# Patient Record
Sex: FEMALE | Race: WHITE
[De-identification: names, ages, dates, MRNs, and addresses within clinical notes are randomized per-mention and may not be internally consistent; named-entity substitution may affect disease eponyms.]

---

## 2019-07-22 ENCOUNTER — HOSPITAL ENCOUNTER (EMERGENCY)
Dept: HOSPITAL 83 - ED | Age: 64
LOS: 1 days | Discharge: HOME | End: 2019-07-23
Payer: MEDICARE

## 2019-07-22 VITALS — WEIGHT: 115 LBS | HEIGHT: 55 IN

## 2019-07-22 DIAGNOSIS — S00.03XA: Primary | ICD-10-CM

## 2019-07-22 DIAGNOSIS — I10: ICD-10-CM

## 2019-07-22 DIAGNOSIS — S40.022A: ICD-10-CM
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
## 2019-07-22 DIAGNOSIS — G40.909: ICD-10-CM

## 2019-07-22 DIAGNOSIS — Z79.899: ICD-10-CM

## 2019-07-22 DIAGNOSIS — F17.200: ICD-10-CM

## 2019-08-28 ENCOUNTER — HOSPITAL ENCOUNTER (EMERGENCY)
Dept: HOSPITAL 83 - ED | Age: 64
Discharge: HOME | End: 2019-08-28
Payer: MEDICARE

## 2019-08-28 VITALS — HEIGHT: 61.97 IN | WEIGHT: 120 LBS | BODY MASS INDEX: 22.08 KG/M2
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
## 2019-08-28 DIAGNOSIS — Z79.899: ICD-10-CM
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
## 2019-08-28 DIAGNOSIS — F17.200: ICD-10-CM

## 2019-08-28 DIAGNOSIS — S60.221A: Primary | ICD-10-CM

## 2019-08-28 DIAGNOSIS — S70.01XA: ICD-10-CM
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
## 2019-08-28 DIAGNOSIS — Z86.718: ICD-10-CM

## 2019-08-28 DIAGNOSIS — S00.83XA: ICD-10-CM

## 2019-08-28 DIAGNOSIS — R11.10: ICD-10-CM

## 2019-11-08 ENCOUNTER — HOSPITAL ENCOUNTER (INPATIENT)
Dept: HOSPITAL 83 - ED | Age: 64
LOS: 4 days | Discharge: OTHER INSTITUTION | DRG: 689 | End: 2019-11-12
Attending: EMERGENCY MEDICINE | Admitting: EMERGENCY MEDICINE
Payer: MEDICARE

## 2019-11-08 VITALS — DIASTOLIC BLOOD PRESSURE: 80 MMHG | SYSTOLIC BLOOD PRESSURE: 128 MMHG

## 2019-11-08 VITALS — DIASTOLIC BLOOD PRESSURE: 72 MMHG

## 2019-11-08 VITALS — WEIGHT: 107 LBS | HEIGHT: 62 IN | BODY MASS INDEX: 19.69 KG/M2

## 2019-11-08 VITALS — DIASTOLIC BLOOD PRESSURE: 102 MMHG | SYSTOLIC BLOOD PRESSURE: 189 MMHG

## 2019-11-08 VITALS — SYSTOLIC BLOOD PRESSURE: 128 MMHG | DIASTOLIC BLOOD PRESSURE: 68 MMHG

## 2019-11-08 VITALS — DIASTOLIC BLOOD PRESSURE: 90 MMHG

## 2019-11-08 DIAGNOSIS — Z79.899: ICD-10-CM

## 2019-11-08 DIAGNOSIS — B19.20: ICD-10-CM

## 2019-11-08 DIAGNOSIS — K29.80: ICD-10-CM

## 2019-11-08 DIAGNOSIS — K29.70: ICD-10-CM

## 2019-11-08 DIAGNOSIS — K44.9: ICD-10-CM

## 2019-11-08 DIAGNOSIS — Z90.49: ICD-10-CM

## 2019-11-08 DIAGNOSIS — I10: ICD-10-CM

## 2019-11-08 DIAGNOSIS — F17.210: ICD-10-CM

## 2019-11-08 DIAGNOSIS — E44.0: ICD-10-CM

## 2019-11-08 DIAGNOSIS — D69.6: ICD-10-CM

## 2019-11-08 DIAGNOSIS — K22.10: ICD-10-CM

## 2019-11-08 DIAGNOSIS — N17.0: ICD-10-CM

## 2019-11-08 DIAGNOSIS — E87.8: ICD-10-CM

## 2019-11-08 DIAGNOSIS — J44.9: ICD-10-CM

## 2019-11-08 DIAGNOSIS — E87.2: ICD-10-CM

## 2019-11-08 DIAGNOSIS — Z84.89: ICD-10-CM

## 2019-11-08 DIAGNOSIS — G40.909: ICD-10-CM

## 2019-11-08 DIAGNOSIS — Z86.73: ICD-10-CM

## 2019-11-08 DIAGNOSIS — K25.9: ICD-10-CM

## 2019-11-08 DIAGNOSIS — N39.0: Primary | ICD-10-CM

## 2019-11-08 DIAGNOSIS — K74.60: ICD-10-CM

## 2019-11-08 DIAGNOSIS — R74.8: ICD-10-CM

## 2019-11-08 DIAGNOSIS — D75.89: ICD-10-CM

## 2019-11-08 DIAGNOSIS — Z91.81: ICD-10-CM

## 2019-11-08 DIAGNOSIS — K21.0: ICD-10-CM

## 2019-11-08 LAB
ALBUMIN SERPL-MCNC: 3.8 GM/DL (ref 3.1–4.5)
ALP SERPL-CCNC: 128 U/L (ref 45–117)
ALT SERPL W P-5'-P-CCNC: 35 U/L (ref 12–78)
APPEARANCE UR: CLEAR
APTT PPP: 27.5 SECONDS (ref 20–32.1)
AST SERPL-CCNC: 25 IU/L (ref 3–35)
BACTERIA #/AREA URNS HPF: (no result) /[HPF]
BASOPHILS # BLD AUTO: 0.1 10*3/UL (ref 0–0.1)
BASOPHILS NFR BLD AUTO: 1 % (ref 0–1)
BILIRUB UR QL STRIP: NEGATIVE
BUN SERPL-MCNC: 26 MG/DL (ref 7–24)
CHLORIDE SERPL-SCNC: 108 MMOL/L (ref 98–107)
COLOR UR: YELLOW
CREAT SERPL-MCNC: 1.21 MG/DL (ref 0.55–1.02)
EOSINOPHIL # BLD AUTO: 0.1 10*3/UL (ref 0–0.4)
EOSINOPHIL # BLD AUTO: 1.6 % (ref 1–4)
EPI CELLS #/AREA URNS HPF: (no result) /[HPF]
ERYTHROCYTE [DISTWIDTH] IN BLOOD BY AUTOMATED COUNT: 16.5 % (ref 0–14.5)
GLUCOSE UR QL: NEGATIVE
HCT VFR BLD AUTO: 38.9 % (ref 37–47)
HGB BLD-MCNC: 12.6 G/DL (ref 12–16)
HGB UR QL STRIP: NEGATIVE
INR BLD: 1 (ref 2–3.5)
KETONES UR QL STRIP: NEGATIVE
LEUKOCYTE ESTERASE UR QL STRIP: (no result)
LIPASE SERPL-CCNC: 101 U/L (ref 73–393)
LYMPHOCYTES # BLD AUTO: 1.4 10*3/UL (ref 1.3–4.4)
LYMPHOCYTES NFR BLD AUTO: 20.7 % (ref 27–41)
MCH RBC QN AUTO: 32.3 PG (ref 27–31)
MCHC RBC AUTO-ENTMCNC: 32.4 G/DL (ref 33–37)
MCV RBC AUTO: 99.7 FL (ref 81–99)
MONOCYTES # BLD AUTO: 0.5 10*3/UL (ref 0.1–1)
MONOCYTES NFR BLD MANUAL: 7.4 % (ref 3–9)
NEUT #: 4.6 10*3/UL (ref 2.3–7.9)
NEUT %: 68.9 % (ref 47–73)
NITRITE UR QL STRIP: NEGATIVE
NRBC BLD QL AUTO: 0 10*3/UL (ref 0–0)
PH UR STRIP: 6 [PH] (ref 5–9)
PLATELET # BLD AUTO: 104 10*3/UL (ref 130–400)
PMV BLD AUTO: 9.8 FL (ref 9.6–12.3)
POTASSIUM SERPL-SCNC: 4.5 MMOL/L (ref 3.5–5.1)
PROT SERPL-MCNC: 8.4 GM/DL (ref 6.4–8.2)
RBC # BLD AUTO: 3.9 10*6/UL (ref 4.1–5.1)
RBC #/AREA URNS HPF: (no result) RBC/HPF (ref 0–2)
SODIUM SERPL-SCNC: 137 MMOL/L (ref 136–145)
SP GR UR: 1.01 (ref 1–1.03)
TROPONIN I SERPL-MCNC: < 0.015 NG/ML (ref ?–0.04)
UROBILINOGEN UR STRIP-MCNC: 0.2 E.U./DL (ref 0.2–1)
WBC NRBC COR # BLD AUTO: 6.7 10*3/UL (ref 4.8–10.8)

## 2019-11-08 NOTE — NUR
Time: 1635
A 64  year old FEMALE admitted to 4E
under services of MARKO NAIR DO.
Pt. arrived via stretcher from
ER.  Chief complaint: FALLS.
 
JENNIFER SUTTON

## 2019-11-09 VITALS — SYSTOLIC BLOOD PRESSURE: 109 MMHG | DIASTOLIC BLOOD PRESSURE: 80 MMHG

## 2019-11-09 VITALS — DIASTOLIC BLOOD PRESSURE: 80 MMHG

## 2019-11-09 VITALS — DIASTOLIC BLOOD PRESSURE: 60 MMHG | SYSTOLIC BLOOD PRESSURE: 110 MMHG

## 2019-11-09 VITALS — DIASTOLIC BLOOD PRESSURE: 67 MMHG

## 2019-11-09 VITALS — SYSTOLIC BLOOD PRESSURE: 143 MMHG | DIASTOLIC BLOOD PRESSURE: 63 MMHG

## 2019-11-09 LAB
25(OH)D3 SERPL-MCNC: 9.8 NG/ML (ref 30–100)
ALBUMIN SERPL-MCNC: 3 GM/DL (ref 3.1–4.5)
ALP SERPL-CCNC: 108 U/L (ref 45–117)
ALT SERPL W P-5'-P-CCNC: 30 U/L (ref 12–78)
APTT PPP: 26.7 SECONDS (ref 20–32.1)
AST SERPL-CCNC: 27 IU/L (ref 3–35)
BASOPHILS # BLD AUTO: 0 10*3/UL (ref 0–0.1)
BASOPHILS NFR BLD AUTO: 0.6 % (ref 0–1)
BUN SERPL-MCNC: 17 MG/DL (ref 7–24)
CHLORIDE SERPL-SCNC: 115 MMOL/L (ref 98–107)
CHOLEST SERPL-MCNC: 115 MG/DL (ref ?–200)
CREAT SERPL-MCNC: 0.86 MG/DL (ref 0.55–1.02)
EOSINOPHIL # BLD AUTO: 0.2 10*3/UL (ref 0–0.4)
EOSINOPHIL # BLD AUTO: 2.4 % (ref 1–4)
ERYTHROCYTE [DISTWIDTH] IN BLOOD BY AUTOMATED COUNT: 16.9 % (ref 0–14.5)
HCT VFR BLD AUTO: 38.8 % (ref 37–47)
HDLC SERPL-MCNC: 50 MG/DL (ref 40–60)
HGB BLD-MCNC: 12.7 G/DL (ref 12–16)
INR BLD: 1 (ref 2–3.5)
LDLC SERPL DIRECT ASSAY-MCNC: 47 MG/DL (ref 9–159)
LYMPHOCYTES # BLD AUTO: 0.8 10*3/UL (ref 1.3–4.4)
LYMPHOCYTES NFR BLD AUTO: 12.2 % (ref 27–41)
MCH RBC QN AUTO: 32.8 PG (ref 27–31)
MCHC RBC AUTO-ENTMCNC: 32.7 G/DL (ref 33–37)
MCV RBC AUTO: 100.3 FL (ref 81–99)
MONOCYTES # BLD AUTO: 0.3 10*3/UL (ref 0.1–1)
MONOCYTES NFR BLD MANUAL: 5.3 % (ref 3–9)
NEUT #: 4.9 10*3/UL (ref 2.3–7.9)
NEUT %: 79 % (ref 47–73)
NRBC BLD QL AUTO: 0 % (ref 0–0)
PHOSPHATE SERPL-MCNC: 3.2 MG/DL (ref 2.5–4.9)
PLATELET # BLD AUTO: 74 10*3/UL (ref 130–400)
PMV BLD AUTO: 9.9 FL (ref 9.6–12.3)
POTASSIUM SERPL-SCNC: 4 MMOL/L (ref 3.5–5.1)
PROT SERPL-MCNC: 6.9 GM/DL (ref 6.4–8.2)
RBC # BLD AUTO: 3.87 10*6/UL (ref 4.1–5.1)
SODIUM SERPL-SCNC: 141 MMOL/L (ref 136–145)
T4 FREE SERPL-MCNC: 0.94 NG/DL (ref 0.76–1.46)
TRIGL SERPL-MCNC: 90 MG/DL (ref ?–150)
TSH SERPL DL<=0.005 MIU/L-ACNC: 3.07 UIU/ML (ref 0.36–4.75)
VITAMIN B12: 1504 PG/ML (ref 247–911)
VLDLC SERPL CALC-MCNC: 18 MG/DL (ref 6–40)
WBC NRBC COR # BLD AUTO: 6.2 10*3/UL (ref 4.8–10.8)

## 2019-11-09 NOTE — NUR
NORCO GIVEN PER ORDER FOR BACK AND RIGHT SIDED PAIN RATED "7". SEE MAR. UP TO
BATHROOM WITH MINIMAL ASSIST X1 AND BACK TO BED.

## 2019-11-10 VITALS — SYSTOLIC BLOOD PRESSURE: 135 MMHG | DIASTOLIC BLOOD PRESSURE: 58 MMHG

## 2019-11-10 VITALS — DIASTOLIC BLOOD PRESSURE: 64 MMHG

## 2019-11-10 VITALS — DIASTOLIC BLOOD PRESSURE: 48 MMHG | SYSTOLIC BLOOD PRESSURE: 107 MMHG

## 2019-11-10 VITALS — DIASTOLIC BLOOD PRESSURE: 58 MMHG

## 2019-11-10 VITALS — DIASTOLIC BLOOD PRESSURE: 85 MMHG

## 2019-11-10 LAB
ALBUMIN SERPL-MCNC: 3 GM/DL (ref 3.1–4.5)
ALP SERPL-CCNC: 121 U/L (ref 45–117)
ALT SERPL W P-5'-P-CCNC: 25 U/L (ref 12–78)
AST SERPL-CCNC: 21 IU/L (ref 3–35)
BASOPHILS # BLD AUTO: 0 10*3/UL (ref 0–0.1)
BASOPHILS NFR BLD AUTO: 1.1 % (ref 0–1)
BUN SERPL-MCNC: 13 MG/DL (ref 7–24)
CHLORIDE SERPL-SCNC: 114 MMOL/L (ref 98–107)
CREAT SERPL-MCNC: 0.77 MG/DL (ref 0.55–1.02)
EOSINOPHIL # BLD AUTO: 0.1 10*3/UL (ref 0–0.4)
EOSINOPHIL # BLD AUTO: 3.6 % (ref 1–4)
ERYTHROCYTE [DISTWIDTH] IN BLOOD BY AUTOMATED COUNT: 16.8 % (ref 0–14.5)
HCT VFR BLD AUTO: 34.8 % (ref 37–47)
HGB BLD-MCNC: 11.5 G/DL (ref 12–16)
LYMPHOCYTES # BLD AUTO: 0.7 10*3/UL (ref 1.3–4.4)
LYMPHOCYTES NFR BLD AUTO: 17.8 % (ref 27–41)
MCH RBC QN AUTO: 32.9 PG (ref 27–31)
MCHC RBC AUTO-ENTMCNC: 33 G/DL (ref 33–37)
MCV RBC AUTO: 99.4 FL (ref 81–99)
MONOCYTES # BLD AUTO: 0.3 10*3/UL (ref 0.1–1)
MONOCYTES NFR BLD MANUAL: 8.7 % (ref 3–9)
NEUT #: 2.5 10*3/UL (ref 2.3–7.9)
NEUT %: 68.3 % (ref 47–73)
NRBC BLD QL AUTO: 0 10*3/UL (ref 0–0)
PLATELET # BLD AUTO: 65 10*3/UL (ref 130–400)
PMV BLD AUTO: 9.8 FL (ref 9.6–12.3)
POTASSIUM SERPL-SCNC: 4.2 MMOL/L (ref 3.5–5.1)
PROT SERPL-MCNC: 7.1 GM/DL (ref 6.4–8.2)
RBC # BLD AUTO: 3.5 10*6/UL (ref 4.1–5.1)
SODIUM SERPL-SCNC: 143 MMOL/L (ref 136–145)
WBC NRBC COR # BLD AUTO: 3.7 10*3/UL (ref 4.8–10.8)

## 2019-11-10 NOTE — NUR
PT REQUESTED AND WAS MEDICATED WITH NORCO FOR C/O GENERALIZED PAIN AND XANAX
FOR C/O ANXIETY. CALL LIGHT IN REACH. WILL MONITOR

## 2019-11-10 NOTE — NUR
Patient resting quietly with no c/o discomfort. Respirations easy and regular.
Vital signs stable. No overt distress.
ANTONINA OLIVER R

## 2019-11-11 VITALS — DIASTOLIC BLOOD PRESSURE: 60 MMHG

## 2019-11-11 VITALS — DIASTOLIC BLOOD PRESSURE: 64 MMHG | SYSTOLIC BLOOD PRESSURE: 109 MMHG

## 2019-11-11 VITALS — DIASTOLIC BLOOD PRESSURE: 68 MMHG

## 2019-11-11 VITALS — DIASTOLIC BLOOD PRESSURE: 58 MMHG | SYSTOLIC BLOOD PRESSURE: 113 MMHG

## 2019-11-11 VITALS — SYSTOLIC BLOOD PRESSURE: 123 MMHG | DIASTOLIC BLOOD PRESSURE: 70 MMHG

## 2019-11-11 VITALS — DIASTOLIC BLOOD PRESSURE: 75 MMHG

## 2019-11-11 VITALS — DIASTOLIC BLOOD PRESSURE: 54 MMHG

## 2019-11-11 LAB
ALBUMIN SERPL-MCNC: 2.9 GM/DL (ref 3.1–4.5)
ALP SERPL-CCNC: 107 U/L (ref 45–117)
ALT SERPL W P-5'-P-CCNC: 28 U/L (ref 12–78)
AST SERPL-CCNC: 21 IU/L (ref 3–35)
BUN SERPL-MCNC: 18 MG/DL (ref 7–24)
CHLORIDE SERPL-SCNC: 110 MMOL/L (ref 98–107)
CREAT SERPL-MCNC: 0.72 MG/DL (ref 0.55–1.02)
ERYTHROCYTE [DISTWIDTH] IN BLOOD BY AUTOMATED COUNT: 16.9 % (ref 0–14.5)
HCT VFR BLD AUTO: 35.5 % (ref 37–47)
HGB BLD-MCNC: 11.7 G/DL (ref 12–16)
MCH RBC QN AUTO: 32.9 PG (ref 27–31)
MCHC RBC AUTO-ENTMCNC: 33 G/DL (ref 33–37)
MCV RBC AUTO: 99.7 FL (ref 81–99)
NRBC BLD QL AUTO: 0 % (ref 0–0)
PLATELET # BLD AUTO: 63 10*3/UL (ref 130–400)
PLATELET SUFFICIENCY: (no result)
PMV BLD AUTO: 10.9 FL (ref 9.6–12.3)
POTASSIUM SERPL-SCNC: 4.3 MMOL/L (ref 3.5–5.1)
PROT SERPL-MCNC: 6.9 GM/DL (ref 6.4–8.2)
RBC # BLD AUTO: 3.56 10*6/UL (ref 4.1–5.1)
RBC MORPH BLD: NORMAL
SODIUM SERPL-SCNC: 140 MMOL/L (ref 136–145)
TOTAL CELLS COUNTED: 100 #CELLS
WBC NRBC COR # BLD AUTO: 3 10*3/UL (ref 4.8–10.8)

## 2019-11-11 PROCEDURE — 0DB78ZX EXCISION OF STOMACH, PYLORUS, VIA NATURAL OR ARTIFICIAL OPENING ENDOSCOPIC, DIAGNOSTIC: ICD-10-PCS | Performed by: INTERNAL MEDICINE

## 2019-11-11 NOTE — NUR
Occupational Therapy evaluation completed on 4 with full eval to follow.
Precautions include fall risk;bed alarm, h/o recent falls, right
hemiparesis,moderate complexity level 98635 via chart review, testing and
evaluation. Recommend OT per POC and SNF to enable return home at indep level.
Thank you.
Mary Lou Warren OTR/l

## 2019-11-11 NOTE — NUR
in to talk to patient.
Patient states lives at HOME with BROTHER.
There are FEW steps in the home.
Physician: MIKE
[facility]
[place] health services: NONE
Patient's level of ADLs: MINIMAL ASSIST
Patient has working utilities: YES
DME: NONE
Follow-up physician's appointment after d/c: WILL BE MADE BY HOSPITALIST NURSE
DIRECTOR ON DISCHARGE
Does patient want to access PORTAL?: NO
Discharge plan PT LIVES AT HOME WITH HER BROTHER.  STATES SHE HAS BEEN FALLING
A LOT LATELY, TALKED WITH PT ABOUT GOING TO A SNF STAY PRIOR TO GOING HOME AND
SHE IS AGREEING.  GIVEN LIST OF FACILITIES SHE STATES SHE HAS BEEN TO [place]
PRIOR AND WOULD LIKE TO RETURN THERE. DISCHARGE PLANNER INFORMED AND WILL SEND
REFERRAL.  WILL CONTINUE TO FOLLOW..
 
MORENA SANTOS

## 2019-11-11 NOTE — NUR
PRN RESTORIL EFFECTIVE. PATIENT SLEEPING IN BED. RESPIRATIONS EVEN AND
UNLABORED. CALL LIGHT WITHIN REACH. BED ALARM ON FOR SAFETY.

## 2019-11-11 NOTE — NUR
Nutritional Support Services Note: Pt has a scabbed area noted on back, she
states its due to a fall at home last week. Appetite is good for meals, at
times flucuates. She doesn't want a supplement at this time, but we will
provide a snack nightly. Will follow as needed. Umm Serrano Rdn Ld

## 2019-11-11 NOTE — NUR
ARCENIO EVERETT [id number]
 
Please refer to the physician's history and physical for past medical history,
comorbid conditions, and allergies.
   Diagnosis: UTI ACUTE KIDNEY FAILURE WITH TUBULAR NECROSIS
 
   Jackson Score: 19,LOW OR NO RISK
 
WOUND DESCRIPTIONS:
Wound Number: 1
Location of the wound: LOWER BACK MIDLINE
Type of wound: STAGE 2
Thickness: Partial
Size: 1.5cm X 1.7cm X 0.1cm
Tunneling: NONE
Undermining: NONE
Sinus Tract: NONE
Presence of Exudate: Serous
Amount: Light
Color: Red
Odor: None
Periwound Skin Appearance: Erythema
Wound edges: APPROXIMATED
Pain (associated with wound): DENIED AT TIME OF ASSESSMENT
How does patient state this happened? PATIENT STATES THIS HAPPENED WHEN SHE
FELL THIS PAST WEEK.
   Surface the patient is resting on: Isoflex
 
SKIN PREVENTION RECOMMENDATION:
   1.  Pressure redistribution support surface as appropriate
   2.  Elevate heels
   3.  Remove boots/TEDS every shift and reapply
   4.  Head of bed 30 degrees as tolerated
   5.  Assess nutrition and hydration
   6.  Manage moisture
   7.  Avoid the use of containment devices while in bed
   8.  Use absorptive products on surfaces limit layers of linens on bed
   9.  Turn and reposition every 1-2 hours in bed and every 1 hour in chair as
       tolerated
   10. Weight shifts every 15 minutes while up in chair
   11. Offloading with pillows or device to keep heels elevated off bed
   12. Monitor skin at least every shift
   13. Inspect under medical devices twice a day
 
 
WOUND TREATMENT RECOMMENDATIONS:
CONTINUE CURRENT STAGE 2 GUIDELINES.

## 2019-11-11 NOTE — NUR
PATIENT MEDICATED WITH PRN NORCO FOR C/O GENERALIZED PAIN. LUNG SOUNDS CLEAR.
BS NORMOACTIVE X4. PATIENT DENIES NAUSEA AND VOMITING. CALL LIGHT WITHIN
REACH. WILL MONITOR FOR EFFECTIVENESS.

## 2019-11-11 NOTE — NUR
PATIENT MEDICATED WITH PRN RESTORIL PER REQUEST FOR SOMETHING FOR SLEEP. WILL
MONITOR FOR EFFECTIVENESS.

## 2019-11-11 NOTE — NUR
PHYSICAL THERAPY
 
Patient seen this pm 1:1 for therapy visit and was supine in bed with a family
visitor upon therapist arrival. Patient identified by name /  and was very
pleasant this afternoon, voicing no new c/o's.  Patient transfers supine to
sit EOB with MIN A and then sit to stand CGA x 1. Patient ambulates HHA/CGA,
45'x 2, demonstrating slow, ataxic gait pattern and pronated R foot posture.
Patient able to take 5-6 backward steps and tolerated eyes open / closed with
only slight LOB, but was able to self correct each trial. Patient returned to
supine in bed with mild fatigue and remained with call light, tray table,
telephone, bed alarm for safety. Will continue per POC as tolerated, total
treatment time 16 minutes.  Yoshi Ayers, PTA

## 2019-11-11 NOTE — NUR
PATIENT ASSISTED TO RESTROOM AND BACK TO BED BY STAFF. GAIT VERY UNSTEADY THIS
MORNING. NO COMPLAINTS OF PAIN. CALL LIGHT WITHIN REACH. BED ALARM ON FOR
SAFETY.

## 2019-11-11 NOTE — NUR
PHYSICAL THERAPY
 
Nursing screen received and chart reviewed. Physical therapy order received
and completed. Thank you. Petra Gould,PT,DPT

## 2019-11-11 NOTE — NUR
Patient requesting referral to [facility] ([facility]).
Contacted [facility] and faxed referral. waiting on review/acceptance.

## 2019-11-12 VITALS — SYSTOLIC BLOOD PRESSURE: 119 MMHG | DIASTOLIC BLOOD PRESSURE: 67 MMHG

## 2019-11-12 VITALS — DIASTOLIC BLOOD PRESSURE: 67 MMHG

## 2019-11-12 NOTE — NUR
Discharge instructions reviewed with patient/family. Patient receptive and
verbalizes understanding. Follow-up care arranged. Written instructions given
to patient/family.
ANTONINA OLIVER

## 2019-11-12 NOTE — NUR
PT REQUESTED AND WAS MEDICATED WITH NORCO FOR C/O GENERALIZED PAIN. CALL LIGHT
IN REACH. WILLL MONITOR

## 2019-11-12 NOTE — NUR
Patient accepted to [facility]. 3 night stay complete.
Patient can go when medically stable for discharge.

## 2019-11-12 NOTE — NUR
Patient is discharged to [place], transportation scheduled for 1PM with
[place] ambulette. NH, nursing/charles clerk all notified.
Patient stated her son is her only contact and he is at work until 8 PM.

## 2019-11-12 NOTE — NUR
PHYSICAL THERAPY
 
Patient was approached several times this am for therapy visit and was
sleeping first attempt, then eating a late breakfast upon second attempt. Will
continue per POC as able.  Yoshi Ayers, PTA

## 2019-11-12 NOTE — NUR
Patient stated she will care for this area upon return home and if she needs
to follow up anywhere she will contact the wound care center

## 2019-11-13 NOTE — NUR
OCCUPATIONAL THERAPY CO-SIGN
 
I approve of the Occupational Therapy notes written above.
RABIA JIM OTR/KANU

## 2019-12-28 ENCOUNTER — HOSPITAL ENCOUNTER (EMERGENCY)
Dept: HOSPITAL 83 - ED | Age: 64
Discharge: ANOTHER HOSPITAL | End: 2019-12-28
Payer: MEDICARE

## 2019-12-28 VITALS — HEIGHT: 61.97 IN | BODY MASS INDEX: 27.6 KG/M2 | WEIGHT: 150 LBS

## 2019-12-28 DIAGNOSIS — S01.01XA: ICD-10-CM
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
## 2019-12-28 DIAGNOSIS — F10.929: ICD-10-CM

## 2019-12-28 DIAGNOSIS — R41.0: ICD-10-CM
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
## 2019-12-28 DIAGNOSIS — M79.601: ICD-10-CM

## 2019-12-28 DIAGNOSIS — I10: ICD-10-CM

## 2019-12-28 DIAGNOSIS — Z86.73: ICD-10-CM

## 2019-12-28 DIAGNOSIS — Z90.49: ICD-10-CM

## 2019-12-28 DIAGNOSIS — M79.604: ICD-10-CM

## 2019-12-28 DIAGNOSIS — F12.90: ICD-10-CM

## 2019-12-28 DIAGNOSIS — R47.81: ICD-10-CM
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
## 2019-12-28 DIAGNOSIS — Z79.899: ICD-10-CM

## 2019-12-28 DIAGNOSIS — F17.200: ICD-10-CM

## 2019-12-28 DIAGNOSIS — S06.5X2A: Primary | ICD-10-CM
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
## 2019-12-28 LAB
ALBUMIN SERPL-MCNC: 3.5 GM/DL (ref 3.1–4.5)
ALP SERPL-CCNC: 179 U/L (ref 45–117)
ALT SERPL W P-5'-P-CCNC: 36 U/L (ref 12–78)
AMPHETAMINES UR QL SCN: < 1000
APPEARANCE UR: (no result)
APTT PPP: 28 SECONDS (ref 20–32.1)
AST SERPL-CCNC: 35 IU/L (ref 3–35)
BACTERIA #/AREA URNS HPF: (no result) /[HPF]
BARBITURATES UR QL SCN: < 200
BASOPHILS # BLD AUTO: 0.1 10*3/UL (ref 0–0.1)
BASOPHILS NFR BLD AUTO: 0.7 % (ref 0–1)
BENZODIAZ UR QL SCN: < 200
BILIRUB UR QL STRIP: NEGATIVE
BUN SERPL-MCNC: 22 MG/DL (ref 7–24)
BZE UR QL SCN: < 300
CANNABINOIDS UR QL SCN: < 50
CHLORIDE SERPL-SCNC: 113 MMOL/L (ref 98–107)
COLOR UR: YELLOW
CREAT SERPL-MCNC: 1.08 MG/DL (ref 0.55–1.02)
EOSINOPHIL # BLD AUTO: 0 10*3/UL (ref 0–0.4)
EOSINOPHIL # BLD AUTO: 0.4 % (ref 1–4)
EPI CELLS #/AREA URNS HPF: (no result) /[HPF]
ERYTHROCYTE [DISTWIDTH] IN BLOOD BY AUTOMATED COUNT: 13.9 % (ref 0–14.5)
ETHANOL SERPL-MCNC: 146 MG/DL (ref ?–3)
GLUCOSE UR QL: NEGATIVE
HCT VFR BLD AUTO: 33.6 % (ref 37–47)
HGB BLD-MCNC: 11.1 G/DL (ref 12–16)
HGB UR QL STRIP: (no result)
INR BLD: 1 (ref 2–3.5)
KETONES UR QL STRIP: NEGATIVE
LEUKOCYTE ESTERASE UR QL STRIP: (no result)
LYMPHOCYTES # BLD AUTO: 1.2 10*3/UL (ref 1.3–4.4)
LYMPHOCYTES NFR BLD AUTO: 10.9 % (ref 27–41)
MCH RBC QN AUTO: 33.3 PG (ref 27–31)
MCHC RBC AUTO-ENTMCNC: 33 G/DL (ref 33–37)
MCV RBC AUTO: 100.9 FL (ref 81–99)
METHADONE UR QL SCN: < 300
MONOCYTES # BLD AUTO: 0.5 10*3/UL (ref 0.1–1)
MONOCYTES NFR BLD MANUAL: 4.3 % (ref 3–9)
NEUT #: 8.7 10*3/UL (ref 2.3–7.9)
NEUT %: 82.7 % (ref 47–73)
NITRITE UR QL STRIP: NEGATIVE
NRBC BLD QL AUTO: 0 10*3/UL (ref 0–0)
OPIATES UR QL SCN: < 300
PCP UR QL SCN: <  25
PH UR STRIP: 6 [PH] (ref 5–9)
PLATELET # BLD AUTO: 96 10*3/UL (ref 130–400)
PMV BLD AUTO: 10.3 FL (ref 9.6–12.3)
POTASSIUM SERPL-SCNC: 3.9 MMOL/L (ref 3.5–5.1)
PROT SERPL-MCNC: 8 GM/DL (ref 6.4–8.2)
RBC # BLD AUTO: 3.33 10*6/UL (ref 4.1–5.1)
SODIUM SERPL-SCNC: 142 MMOL/L (ref 136–145)
SP GR UR: 1.02 (ref 1–1.03)
UROBILINOGEN UR STRIP-MCNC: 0.2 E.U./DL (ref 0.2–1)
WBC #/AREA URNS HPF: (no result) WBC/HPF (ref 0–5)
WBC NRBC COR # BLD AUTO: 10.5 10*3/UL (ref 4.8–10.8)

## 2020-01-01 ENCOUNTER — APPOINTMENT (OUTPATIENT)
Dept: GENERAL RADIOLOGY | Age: 65
DRG: 870 | End: 2020-01-01
Payer: MEDICARE

## 2020-01-01 ENCOUNTER — APPOINTMENT (OUTPATIENT)
Dept: ULTRASOUND IMAGING | Age: 65
DRG: 870 | End: 2020-01-01
Payer: MEDICARE

## 2020-01-01 ENCOUNTER — HOSPITAL ENCOUNTER (INPATIENT)
Age: 65
LOS: 6 days | DRG: 870 | End: 2020-08-15
Attending: EMERGENCY MEDICINE | Admitting: FAMILY MEDICINE
Payer: MEDICARE

## 2020-01-01 ENCOUNTER — APPOINTMENT (OUTPATIENT)
Dept: CT IMAGING | Age: 65
DRG: 870 | End: 2020-01-01
Payer: MEDICARE

## 2020-01-01 ENCOUNTER — HOSPITAL ENCOUNTER (EMERGENCY)
Age: 65
Discharge: ANOTHER HOSPITAL | End: 2020-04-28
Attending: EMERGENCY MEDICINE
Payer: MEDICARE

## 2020-01-01 VITALS
SYSTOLIC BLOOD PRESSURE: 147 MMHG | OXYGEN SATURATION: 84 % | TEMPERATURE: 99.2 F | HEIGHT: 62 IN | WEIGHT: 179.9 LBS | DIASTOLIC BLOOD PRESSURE: 72 MMHG | BODY MASS INDEX: 33.1 KG/M2

## 2020-01-01 VITALS
TEMPERATURE: 98 F | RESPIRATION RATE: 18 BRPM | DIASTOLIC BLOOD PRESSURE: 93 MMHG | OXYGEN SATURATION: 98 % | HEART RATE: 114 BPM | WEIGHT: 115 LBS | SYSTOLIC BLOOD PRESSURE: 193 MMHG

## 2020-01-01 LAB
AADO2: 105.6 MMHG
AADO2: 132.5 MMHG
AADO2: 134.5 MMHG
AADO2: 143.7 MMHG
AADO2: 151.1 MMHG
AADO2: 161 MMHG
ABO/RH: NORMAL
ABO/RH: NORMAL
ACETAMINOPHEN LEVEL: <5 MCG/ML (ref 10–30)
ACINETOBACTER BAUMANNII BY PCR: NOT DETECTED
ALBUMIN SERPL-MCNC: 1.6 G/DL (ref 3.5–5.2)
ALBUMIN SERPL-MCNC: 1.8 G/DL (ref 3.5–5.2)
ALBUMIN SERPL-MCNC: 1.9 G/DL (ref 3.5–5.2)
ALBUMIN SERPL-MCNC: 2.2 G/DL (ref 3.5–5.2)
ALBUMIN SERPL-MCNC: 2.3 G/DL (ref 3.5–5.2)
ALBUMIN SERPL-MCNC: 2.4 G/DL (ref 3.5–5.2)
ALBUMIN SERPL-MCNC: 2.5 G/DL (ref 3.5–5.2)
ALBUMIN SERPL-MCNC: 3.2 G/DL (ref 3.5–5.2)
ALBUMIN SERPL-MCNC: ABNORMAL G/DL (ref 3.5–5.2)
ALP BLD-CCNC: 163 U/L (ref 35–104)
ALP BLD-CCNC: 189 U/L (ref 35–104)
ALP BLD-CCNC: 198 U/L (ref 35–104)
ALP BLD-CCNC: 203 U/L (ref 35–104)
ALP BLD-CCNC: 212 U/L (ref 35–104)
ALP BLD-CCNC: 229 U/L (ref 35–104)
ALP BLD-CCNC: 240 U/L (ref 35–104)
ALP BLD-CCNC: 272 U/L (ref 35–104)
ALP BLD-CCNC: ABNORMAL U/L (ref 35–104)
ALT SERPL-CCNC: 18 U/L (ref 0–32)
ALT SERPL-CCNC: 18 U/L (ref 0–32)
ALT SERPL-CCNC: 22 U/L (ref 0–32)
ALT SERPL-CCNC: 24 U/L (ref 0–32)
ALT SERPL-CCNC: 32 U/L (ref 0–32)
ALT SERPL-CCNC: 40 U/L (ref 0–32)
ALT SERPL-CCNC: 57 U/L (ref 0–32)
ALT SERPL-CCNC: 58 U/L (ref 0–32)
ALT SERPL-CCNC: ABNORMAL U/L (ref 0–32)
AMMONIA: 21.3 UMOL/L (ref 11–51)
AMPHETAMINE SCREEN, URINE: NOT DETECTED
ANION GAP SERPL CALCULATED.3IONS-SCNC: 11 MMOL/L (ref 7–16)
ANION GAP SERPL CALCULATED.3IONS-SCNC: 12 MMOL/L (ref 7–16)
ANION GAP SERPL CALCULATED.3IONS-SCNC: 13 MMOL/L (ref 7–16)
ANION GAP SERPL CALCULATED.3IONS-SCNC: 13 MMOL/L (ref 7–16)
ANION GAP SERPL CALCULATED.3IONS-SCNC: 16 MMOL/L (ref 7–16)
ANION GAP SERPL CALCULATED.3IONS-SCNC: 16 MMOL/L (ref 7–16)
ANION GAP SERPL CALCULATED.3IONS-SCNC: 20 MMOL/L (ref 7–16)
ANION GAP SERPL CALCULATED.3IONS-SCNC: 20 MMOL/L (ref 7–16)
ANION GAP SERPL CALCULATED.3IONS-SCNC: 26 MMOL/L (ref 7–16)
ANION GAP SERPL CALCULATED.3IONS-SCNC: ABNORMAL MMOL/L (ref 7–16)
ANISOCYTOSIS: ABNORMAL
ANTIBODY IDENTIFICATION: NORMAL
ANTIBODY SCREEN: NORMAL
ANTIBODY SCREEN: NORMAL
APTT: 25.5 SEC (ref 24.5–35.1)
APTT: 30.7 SEC (ref 24.5–35.1)
APTT: 32.8 SEC (ref 24.5–35.1)
AST SERPL-CCNC: 118 U/L (ref 0–31)
AST SERPL-CCNC: 204 U/L (ref 0–31)
AST SERPL-CCNC: 23 U/L (ref 0–31)
AST SERPL-CCNC: 238 U/L (ref 0–31)
AST SERPL-CCNC: 25 U/L (ref 0–31)
AST SERPL-CCNC: 25 U/L (ref 0–31)
AST SERPL-CCNC: 42 U/L (ref 0–31)
AST SERPL-CCNC: 53 U/L (ref 0–31)
AST SERPL-CCNC: ABNORMAL U/L (ref 0–31)
B.E.: -1.8 MMOL/L (ref -3–3)
B.E.: -10.3 MMOL/L (ref -3–3)
B.E.: -17.3 MMOL/L (ref -3–3)
B.E.: -18.1 MMOL/L (ref -3–3)
B.E.: -25.3 MMOL/L (ref -3–3)
B.E.: -3.2 MMOL/L (ref -3–3)
B.E.: -3.4 MMOL/L (ref -3–0)
B.E.: -3.6 MMOL/L (ref -3–3)
B.E.: 1.9 MMOL/L (ref -3–3)
BACTERIA: ABNORMAL /HPF
BARBITURATE SCREEN URINE: NOT DETECTED
BASOPHILS ABSOLUTE: 0 E9/L (ref 0–0.2)
BASOPHILS ABSOLUTE: 0.04 E9/L (ref 0–0.2)
BASOPHILS ABSOLUTE: 0.1 E9/L (ref 0–0.2)
BASOPHILS ABSOLUTE: 0.11 E9/L (ref 0–0.2)
BASOPHILS RELATIVE PERCENT: 0 % (ref 0–2)
BASOPHILS RELATIVE PERCENT: 0.2 % (ref 0–2)
BASOPHILS RELATIVE PERCENT: 0.6 % (ref 0–2)
BASOPHILS RELATIVE PERCENT: 0.9 % (ref 0–2)
BENZODIAZEPINE SCREEN, URINE: NOT DETECTED
BETA-HYDROXYBUTYRATE: 0.46 MMOL/L (ref 0.02–0.27)
BILIRUB SERPL-MCNC: 0.7 MG/DL (ref 0–1.2)
BILIRUB SERPL-MCNC: 0.8 MG/DL (ref 0–1.2)
BILIRUB SERPL-MCNC: 0.9 MG/DL (ref 0–1.2)
BILIRUB SERPL-MCNC: 0.9 MG/DL (ref 0–1.2)
BILIRUB SERPL-MCNC: 1.4 MG/DL (ref 0–1.2)
BILIRUB SERPL-MCNC: 1.7 MG/DL (ref 0–1.2)
BILIRUB SERPL-MCNC: 1.8 MG/DL (ref 0–1.2)
BILIRUB SERPL-MCNC: 2.2 MG/DL (ref 0–1.2)
BILIRUB SERPL-MCNC: ABNORMAL MG/DL (ref 0–1.2)
BILIRUBIN DIRECT: 0.9 MG/DL (ref 0–0.3)
BILIRUBIN URINE: ABNORMAL
BILIRUBIN, INDIRECT: 0 MG/DL (ref 0–1)
BLASTS RELATIVE PERCENT: 0.9 % (ref 0–0)
BLOOD BANK DISPENSE STATUS: NORMAL
BLOOD BANK PRODUCT CODE: NORMAL
BLOOD, URINE: ABNORMAL
BOTTLE TYPE: ABNORMAL
BPU ID: NORMAL
BUN BLDV-MCNC: 13 MG/DL (ref 8–23)
BUN BLDV-MCNC: 14 MG/DL (ref 8–23)
BUN BLDV-MCNC: 18 MG/DL (ref 8–23)
BUN BLDV-MCNC: 19 MG/DL (ref 8–23)
BUN BLDV-MCNC: 22 MG/DL (ref 8–23)
BUN BLDV-MCNC: 24 MG/DL (ref 8–23)
BUN BLDV-MCNC: 31 MG/DL (ref 8–23)
BUN BLDV-MCNC: 96 MG/DL (ref 8–23)
BUN BLDV-MCNC: 97 MG/DL (ref 8–23)
BUN BLDV-MCNC: ABNORMAL MG/DL (ref 8–23)
BURR CELLS: ABNORMAL
C-REACTIVE PROTEIN: 26.8 MG/DL (ref 0–0.4)
CALCIUM IONIZED: 0.9 MMOL/L (ref 1.15–1.33)
CALCIUM SERPL-MCNC: 6 MG/DL (ref 8.6–10.2)
CALCIUM SERPL-MCNC: 6.1 MG/DL (ref 8.6–10.2)
CALCIUM SERPL-MCNC: 6.2 MG/DL (ref 8.6–10.2)
CALCIUM SERPL-MCNC: 7.2 MG/DL (ref 8.6–10.2)
CALCIUM SERPL-MCNC: 7.2 MG/DL (ref 8.6–10.2)
CALCIUM SERPL-MCNC: 7.5 MG/DL (ref 8.6–10.2)
CALCIUM SERPL-MCNC: 7.6 MG/DL (ref 8.6–10.2)
CALCIUM SERPL-MCNC: 8.5 MG/DL (ref 8.6–10.2)
CALCIUM SERPL-MCNC: 8.6 MG/DL (ref 8.6–10.2)
CALCIUM SERPL-MCNC: ABNORMAL MG/DL (ref 8.6–10.2)
CANDIDA ALBICANS BY PCR: NOT DETECTED
CANDIDA GLABRATA BY PCR: NOT DETECTED
CANDIDA KRUSEI BY PCR: NOT DETECTED
CANDIDA PARAPSILOSIS BY PCR: NOT DETECTED
CANDIDA TROPICALIS BY PCR: NOT DETECTED
CANNABINOID SCREEN URINE: NOT DETECTED
CARBAPENEM RESISTANCE KPC BY PCR: NOT DETECTED
CHLORIDE BLD-SCNC: 102 MMOL/L (ref 98–107)
CHLORIDE BLD-SCNC: 102 MMOL/L (ref 98–107)
CHLORIDE BLD-SCNC: 94 MMOL/L (ref 98–107)
CHLORIDE BLD-SCNC: 96 MMOL/L (ref 98–107)
CHLORIDE BLD-SCNC: 96 MMOL/L (ref 98–107)
CHLORIDE BLD-SCNC: 97 MMOL/L (ref 98–107)
CHLORIDE BLD-SCNC: 97 MMOL/L (ref 98–107)
CHLORIDE BLD-SCNC: 98 MMOL/L (ref 98–107)
CHLORIDE BLD-SCNC: 99 MMOL/L (ref 98–107)
CHLORIDE BLD-SCNC: ABNORMAL MMOL/L (ref 98–107)
CHLORIDE URINE RANDOM: 70 MMOL/L
CHP ED QC CHECK: NORMAL
CLARITY: ABNORMAL
CO2: 16 MMOL/L (ref 22–29)
CO2: 19 MMOL/L (ref 22–29)
CO2: 19 MMOL/L (ref 22–29)
CO2: 20 MMOL/L (ref 22–29)
CO2: 22 MMOL/L (ref 22–29)
CO2: 23 MMOL/L (ref 22–29)
CO2: 24 MMOL/L (ref 22–29)
CO2: 6 MMOL/L (ref 22–29)
CO2: 9 MMOL/L (ref 22–29)
CO2: ABNORMAL MMOL/L (ref 22–29)
COCAINE METABOLITE SCREEN URINE: NOT DETECTED
COHB: 0 % (ref 0–1.5)
COHB: 0.3 % (ref 0–1.5)
COHB: 0.4 % (ref 0–1.5)
COHB: 0.5 % (ref 0–1.5)
COHB: 0.6 % (ref 0–1.5)
COHB: 0.8 % (ref 0–1.5)
COHB: 1.3 % (ref 0–1.5)
COHB: 1.7 % (ref 0–1.5)
COLOR: ABNORMAL
CORTISOL TOTAL: 89.1 MCG/DL (ref 2.68–18.4)
CREAT SERPL-MCNC: 1.1 MG/DL (ref 0.5–1)
CREAT SERPL-MCNC: 1.2 MG/DL (ref 0.5–1)
CREAT SERPL-MCNC: 1.4 MG/DL (ref 0.5–1)
CREAT SERPL-MCNC: 1.4 MG/DL (ref 0.5–1)
CREAT SERPL-MCNC: 1.6 MG/DL (ref 0.5–1)
CREAT SERPL-MCNC: 1.7 MG/DL (ref 0.5–1)
CREAT SERPL-MCNC: 1.9 MG/DL (ref 0.5–1)
CREAT SERPL-MCNC: 3.7 MG/DL (ref 0.5–1)
CREAT SERPL-MCNC: 3.8 MG/DL (ref 0.5–1)
CREAT SERPL-MCNC: ABNORMAL MG/DL (ref 0.5–1)
CREATININE URINE: 29 MG/DL (ref 29–226)
CRITICAL: ABNORMAL
CULTURE, RESPIRATORY: ABNORMAL
CULTURE, RESPIRATORY: ABNORMAL
D DIMER: 4781 NG/ML DDU
D DIMER: >5250 NG/ML DDU
DAT POLYSPECIFIC: NORMAL
DAT POLYSPECIFIC: NORMAL
DATE ANALYZED: ABNORMAL
DATE OF COLLECTION: ABNORMAL
DELIVERY SYSTEMS: ABNORMAL
DESCRIPTION BLOOD BANK: NORMAL
DEVICE: ABNORMAL
DOHLE BODIES: ABNORMAL
DR. NOTIFY: NORMAL
DR. NOTIFY: NORMAL
EKG ATRIAL RATE: 42 BPM
EKG ATRIAL RATE: 97 BPM
EKG P AXIS: 37 DEGREES
EKG P-R INTERVAL: 138 MS
EKG Q-T INTERVAL: 344 MS
EKG Q-T INTERVAL: 466 MS
EKG QRS DURATION: 134 MS
EKG QRS DURATION: 96 MS
EKG QTC CALCULATION (BAZETT): 436 MS
EKG QTC CALCULATION (BAZETT): 550 MS
EKG R AXIS: -66 DEGREES
EKG R AXIS: 24 DEGREES
EKG T AXIS: 30 DEGREES
EKG T AXIS: 54 DEGREES
EKG VENTRICULAR RATE: 84 BPM
EKG VENTRICULAR RATE: 97 BPM
ENTEROBACTER CLOACAE COMPLEX BY PCR: NOT DETECTED
ENTEROBACTERALES BY PCR: DETECTED
ENTEROCOCCUS BY PCR: NOT DETECTED
EOSINOPHILS ABSOLUTE: 0 E9/L (ref 0.05–0.5)
EOSINOPHILS ABSOLUTE: 0.1 E9/L (ref 0.05–0.5)
EOSINOPHILS ABSOLUTE: 0.11 E9/L (ref 0.05–0.5)
EOSINOPHILS ABSOLUTE: 0.15 E9/L (ref 0.05–0.5)
EOSINOPHILS ABSOLUTE: 0.48 E9/L (ref 0.05–0.5)
EOSINOPHILS RELATIVE PERCENT: 0 % (ref 0–6)
EOSINOPHILS RELATIVE PERCENT: 0.9 % (ref 0–6)
EOSINOPHILS RELATIVE PERCENT: 0.9 % (ref 0–6)
EOSINOPHILS RELATIVE PERCENT: 1 % (ref 0–6)
EOSINOPHILS RELATIVE PERCENT: 2.9 % (ref 0–6)
ESCHERICHIA COLI BY PCR: DETECTED
ETHANOL: <10 MG/DL (ref 0–0.08)
FENTANYL SCREEN, URINE: NOT DETECTED
FERRITIN: 914 NG/ML
FIBRINOGEN: 636 MG/DL (ref 225–540)
FIBRINOGEN: 651 MG/DL (ref 225–540)
FIO2 ARTERIAL: 40
FIO2: 100 %
FIO2: 40 %
FIO2: 50 %
FIO2: 50 %
FOLATE: 10.5 NG/ML (ref 4.8–24.2)
GFR AFRICAN AMERICAN: 15
GFR AFRICAN AMERICAN: 15
GFR AFRICAN AMERICAN: 32
GFR AFRICAN AMERICAN: 37
GFR AFRICAN AMERICAN: 39
GFR AFRICAN AMERICAN: 46
GFR AFRICAN AMERICAN: 46
GFR AFRICAN AMERICAN: 55
GFR AFRICAN AMERICAN: >60
GFR AFRICAN AMERICAN: >60
GFR NON-AFRICAN AMERICAN: 12 ML/MIN/1.73
GFR NON-AFRICAN AMERICAN: 12 ML/MIN/1.73
GFR NON-AFRICAN AMERICAN: 27 ML/MIN/1.73
GFR NON-AFRICAN AMERICAN: 30 ML/MIN/1.73
GFR NON-AFRICAN AMERICAN: 33 ML/MIN/1.73
GFR NON-AFRICAN AMERICAN: 38 ML/MIN/1.73
GFR NON-AFRICAN AMERICAN: 38 ML/MIN/1.73
GFR NON-AFRICAN AMERICAN: 45 ML/MIN/1.73
GFR NON-AFRICAN AMERICAN: 50 ML/MIN/1.73
GFR NON-AFRICAN AMERICAN: >60 ML/MIN/1.73
GLUCOSE BLD-MCNC: 112 MG/DL (ref 74–99)
GLUCOSE BLD-MCNC: 139 MG/DL (ref 74–99)
GLUCOSE BLD-MCNC: 193 MG/DL (ref 74–99)
GLUCOSE BLD-MCNC: 201 MG/DL (ref 74–99)
GLUCOSE BLD-MCNC: 206 MG/DL (ref 74–99)
GLUCOSE BLD-MCNC: 207 MG/DL (ref 74–99)
GLUCOSE BLD-MCNC: 222 MG/DL (ref 74–99)
GLUCOSE BLD-MCNC: 241 MG/DL (ref 74–99)
GLUCOSE BLD-MCNC: 241 MG/DL (ref 74–99)
GLUCOSE BLD-MCNC: 69 MG/DL
GLUCOSE BLD-MCNC: ABNORMAL MG/DL (ref 74–99)
GLUCOSE URINE: NEGATIVE MG/DL
HAEMOPHILUS INFLUENZAE BY PCR: NOT DETECTED
HAPTOGLOBIN: 158 MG/DL (ref 30–200)
HAV IGM SER IA-ACNC: ABNORMAL
HBA1C MFR BLD: 5 % (ref 4–5.6)
HBV SURFACE AB TITR SER: NORMAL {TITER}
HCO3 ARTERIAL: 20 MMOL/L (ref 22–26)
HCO3: 13.8 MMOL/L (ref 22–26)
HCO3: 18 MMOL/L (ref 22–26)
HCO3: 19.6 MMOL/L (ref 22–26)
HCO3: 19.7 MMOL/L (ref 22–26)
HCO3: 22 MMOL/L (ref 22–26)
HCO3: 4.9 MMOL/L (ref 22–26)
HCO3: 7 MMOL/L (ref 22–26)
HCO3: 8.9 MMOL/L (ref 22–26)
HCT VFR BLD CALC: 19.5 % (ref 34–48)
HCT VFR BLD CALC: 21 % (ref 34–48)
HCT VFR BLD CALC: 21.4 % (ref 34–48)
HCT VFR BLD CALC: 22.9 % (ref 34–48)
HCT VFR BLD CALC: 25.6 % (ref 34–48)
HCT VFR BLD CALC: 28.4 % (ref 34–48)
HCT VFR BLD CALC: 28.4 % (ref 34–48)
HCT VFR BLD CALC: 29.1 % (ref 34–48)
HCT VFR BLD CALC: 36.8 % (ref 34–48)
HEMOGLOBIN: 10 G/DL (ref 11.5–15.5)
HEMOGLOBIN: 11.8 G/DL (ref 11.5–15.5)
HEMOGLOBIN: 6.7 G/DL (ref 11.5–15.5)
HEMOGLOBIN: 7.5 G/DL (ref 11.5–15.5)
HEMOGLOBIN: 7.8 G/DL (ref 11.5–15.5)
HEMOGLOBIN: 8.1 G/DL (ref 11.5–15.5)
HEMOGLOBIN: 9.8 G/DL (ref 11.5–15.5)
HEMOGLOBIN: 9.9 G/DL (ref 11.5–15.5)
HEPATITIS B CORE IGM ANTIBODY: ABNORMAL
HEPATITIS B SURFACE ANTIGEN INTERPRETATION: ABNORMAL
HEPATITIS C ANTIBODY INTERPRETATION: REACTIVE
HHB: 0.1 % (ref 0–5)
HHB: 0.6 % (ref 0–5)
HHB: 0.7 % (ref 0–5)
HHB: 0.8 % (ref 0–5)
HHB: 1.5 % (ref 0–5)
HHB: 1.5 % (ref 0–5)
HHB: 2.3 % (ref 0–5)
HHB: 2.6 % (ref 0–5)
HYPOCHROMIA: ABNORMAL
IMMATURE GRANULOCYTES #: 0.54 E9/L
IMMATURE GRANULOCYTES #: 0.81 E9/L
IMMATURE GRANULOCYTES %: 3.3 % (ref 0–5)
IMMATURE GRANULOCYTES %: 4.9 % (ref 0–5)
IMMATURE RETIC FRACT: 16.3 % (ref 3–15.9)
INR BLD: 1.1
INR BLD: 1.2
INR BLD: 1.3
IRON SATURATION: 14 % (ref 15–50)
IRON: 16 MCG/DL (ref 37–145)
KETONES, URINE: NEGATIVE MG/DL
KLEBSIELLA OXYTOCA BY PCR: NOT DETECTED
KLEBSIELLA PNEUMONIAE GROUP BY PCR: NOT DETECTED
LAB: ABNORMAL
LACTATE DEHYDROGENASE: 637 U/L (ref 135–214)
LACTIC ACID, SEPSIS: 1.9 MMOL/L (ref 0.5–1.9)
LACTIC ACID: 1.5 MMOL/L (ref 0.5–2.2)
LACTIC ACID: 1.6 MMOL/L (ref 0.5–2.2)
LACTIC ACID: 2.3 MMOL/L (ref 0.5–2.2)
LACTIC ACID: 2.4 MMOL/L (ref 0.5–2.2)
LACTIC ACID: 2.4 MMOL/L (ref 0.5–2.2)
LACTIC ACID: 2.5 MMOL/L (ref 0.5–2.2)
LACTIC ACID: 3.5 MMOL/L (ref 0.5–2.2)
LACTIC ACID: 4.1 MMOL/L (ref 0.5–2.2)
LACTIC ACID: 5 MMOL/L (ref 0.5–2.2)
LACTIC ACID: 5.2 MMOL/L (ref 0.5–2.2)
LEUKOCYTE ESTERASE, URINE: ABNORMAL
LIPASE: 13 U/L (ref 13–60)
LIPASE: ABNORMAL U/L (ref 13–60)
LISTERIA MONOCYTOGENES BY PCR: NOT DETECTED
LV EF: 65 %
LVEF MODALITY: NORMAL
LYMPHOCYTES ABSOLUTE: 0.29 E9/L (ref 1.5–4)
LYMPHOCYTES ABSOLUTE: 0.44 E9/L (ref 1.5–4)
LYMPHOCYTES ABSOLUTE: 0.5 E9/L (ref 1.5–4)
LYMPHOCYTES ABSOLUTE: 0.62 E9/L (ref 1.5–4)
LYMPHOCYTES ABSOLUTE: 0.64 E9/L (ref 1.5–4)
LYMPHOCYTES ABSOLUTE: 0.69 E9/L (ref 1.5–4)
LYMPHOCYTES ABSOLUTE: 0.82 E9/L (ref 1.5–4)
LYMPHOCYTES ABSOLUTE: 1.04 E9/L (ref 1.5–4)
LYMPHOCYTES RELATIVE PERCENT: 2 % (ref 20–42)
LYMPHOCYTES RELATIVE PERCENT: 3.5 % (ref 20–42)
LYMPHOCYTES RELATIVE PERCENT: 3.5 % (ref 20–42)
LYMPHOCYTES RELATIVE PERCENT: 4.3 % (ref 20–42)
LYMPHOCYTES RELATIVE PERCENT: 4.4 % (ref 20–42)
LYMPHOCYTES RELATIVE PERCENT: 5 % (ref 20–42)
LYMPHOCYTES RELATIVE PERCENT: 5.2 % (ref 20–42)
LYMPHOCYTES RELATIVE PERCENT: 6.3 % (ref 20–42)
Lab: ABNORMAL
Lab: NORMAL
MAGNESIUM: 1.4 MG/DL (ref 1.6–2.6)
MAGNESIUM: 1.6 MG/DL (ref 1.6–2.6)
MAGNESIUM: 1.7 MG/DL (ref 1.6–2.6)
MAGNESIUM: 1.7 MG/DL (ref 1.6–2.6)
MAGNESIUM: 1.8 MG/DL (ref 1.6–2.6)
MAGNESIUM: 1.9 MG/DL (ref 1.6–2.6)
MCH RBC QN AUTO: 32 PG (ref 26–35)
MCH RBC QN AUTO: 32.4 PG (ref 26–35)
MCH RBC QN AUTO: 32.8 PG (ref 26–35)
MCH RBC QN AUTO: 32.9 PG (ref 26–35)
MCH RBC QN AUTO: 33.2 PG (ref 26–35)
MCH RBC QN AUTO: 33.4 PG (ref 26–35)
MCH RBC QN AUTO: 33.5 PG (ref 26–35)
MCH RBC QN AUTO: 33.9 PG (ref 26–35)
MCHC RBC AUTO-ENTMCNC: 32.1 % (ref 32–34.5)
MCHC RBC AUTO-ENTMCNC: 33.7 % (ref 32–34.5)
MCHC RBC AUTO-ENTMCNC: 34.4 % (ref 32–34.5)
MCHC RBC AUTO-ENTMCNC: 34.9 % (ref 32–34.5)
MCHC RBC AUTO-ENTMCNC: 35.2 % (ref 32–34.5)
MCHC RBC AUTO-ENTMCNC: 35.4 % (ref 32–34.5)
MCHC RBC AUTO-ENTMCNC: 35.7 % (ref 32–34.5)
MCHC RBC AUTO-ENTMCNC: 36.4 % (ref 32–34.5)
MCV RBC AUTO: 102.5 FL (ref 80–99.9)
MCV RBC AUTO: 91.9 FL (ref 80–99.9)
MCV RBC AUTO: 91.9 FL (ref 80–99.9)
MCV RBC AUTO: 92.9 FL (ref 80–99.9)
MCV RBC AUTO: 93 FL (ref 80–99.9)
MCV RBC AUTO: 94.6 FL (ref 80–99.9)
MCV RBC AUTO: 95.6 FL (ref 80–99.9)
MCV RBC AUTO: 99.3 FL (ref 80–99.9)
METAMYELOCYTES RELATIVE PERCENT: 0.9 % (ref 0–1)
METAMYELOCYTES RELATIVE PERCENT: 1 % (ref 0–1)
METER GLUCOSE: 128 MG/DL (ref 74–99)
METER GLUCOSE: 128 MG/DL (ref 74–99)
METER GLUCOSE: 132 MG/DL (ref 74–99)
METER GLUCOSE: 147 MG/DL (ref 74–99)
METER GLUCOSE: 147 MG/DL (ref 74–99)
METER GLUCOSE: 158 MG/DL (ref 74–99)
METER GLUCOSE: 200 MG/DL (ref 74–99)
METER GLUCOSE: 201 MG/DL (ref 74–99)
METER GLUCOSE: 205 MG/DL (ref 74–99)
METER GLUCOSE: 209 MG/DL (ref 74–99)
METER GLUCOSE: 224 MG/DL (ref 74–99)
METER GLUCOSE: 226 MG/DL (ref 74–99)
METER GLUCOSE: 229 MG/DL (ref 74–99)
METER GLUCOSE: 255 MG/DL (ref 74–99)
METER GLUCOSE: 64 MG/DL (ref 74–99)
METER GLUCOSE: 69 MG/DL (ref 74–99)
METER GLUCOSE: 94 MG/DL (ref 74–99)
METHADONE SCREEN, URINE: NOT DETECTED
METHB: 0 % (ref 0–1.5)
METHB: 0.1 % (ref 0–1.5)
METHB: 0.1 % (ref 0–1.5)
METHB: 0.2 % (ref 0–1.5)
METHB: 0.2 % (ref 0–1.5)
METHB: 0.3 % (ref 0–1.5)
METHB: 0.4 % (ref 0–1.5)
METHB: 0.4 % (ref 0–1.5)
MODE: ABNORMAL
MODE: AC
MONOCYTES ABSOLUTE: 0.33 E9/L (ref 0.1–0.95)
MONOCYTES ABSOLUTE: 0.52 E9/L (ref 0.1–0.95)
MONOCYTES ABSOLUTE: 0.58 E9/L (ref 0.1–0.95)
MONOCYTES ABSOLUTE: 0.62 E9/L (ref 0.1–0.95)
MONOCYTES ABSOLUTE: 0.62 E9/L (ref 0.1–0.95)
MONOCYTES ABSOLUTE: 0.64 E9/L (ref 0.1–0.95)
MONOCYTES ABSOLUTE: 0.73 E9/L (ref 0.1–0.95)
MONOCYTES ABSOLUTE: 0.91 E9/L (ref 0.1–0.95)
MONOCYTES RELATIVE PERCENT: 2.6 % (ref 2–12)
MONOCYTES RELATIVE PERCENT: 2.6 % (ref 2–12)
MONOCYTES RELATIVE PERCENT: 3.5 % (ref 2–12)
MONOCYTES RELATIVE PERCENT: 4 % (ref 2–12)
MONOCYTES RELATIVE PERCENT: 4.4 % (ref 2–12)
MONOCYTES RELATIVE PERCENT: 5.2 % (ref 2–12)
MONOCYTES RELATIVE PERCENT: 5.2 % (ref 2–12)
MONOCYTES RELATIVE PERCENT: 5.5 % (ref 2–12)
MRSA CULTURE ONLY: NORMAL
NEISSERIA MENINGITIDIS BY PCR: NOT DETECTED
NEUTROPHILS ABSOLUTE: 10.23 E9/L (ref 1.8–7.3)
NEUTROPHILS ABSOLUTE: 10.91 E9/L (ref 1.8–7.3)
NEUTROPHILS ABSOLUTE: 11.25 E9/L (ref 1.8–7.3)
NEUTROPHILS ABSOLUTE: 13.26 E9/L (ref 1.8–7.3)
NEUTROPHILS ABSOLUTE: 13.49 E9/L (ref 1.8–7.3)
NEUTROPHILS ABSOLUTE: 14.33 E9/L (ref 1.8–7.3)
NEUTROPHILS ABSOLUTE: 14.88 E9/L (ref 1.8–7.3)
NEUTROPHILS ABSOLUTE: 15.92 E9/L (ref 1.8–7.3)
NEUTROPHILS RELATIVE PERCENT: 80.2 % (ref 43–80)
NEUTROPHILS RELATIVE PERCENT: 86.7 % (ref 43–80)
NEUTROPHILS RELATIVE PERCENT: 86.9 % (ref 43–80)
NEUTROPHILS RELATIVE PERCENT: 90.4 % (ref 43–80)
NEUTROPHILS RELATIVE PERCENT: 92 % (ref 43–80)
NEUTROPHILS RELATIVE PERCENT: 92.2 % (ref 43–80)
NEUTROPHILS RELATIVE PERCENT: 93 % (ref 43–80)
NEUTROPHILS RELATIVE PERCENT: 93 % (ref 43–80)
NITRITE, URINE: NEGATIVE
NUCLEATED RED BLOOD CELLS: 0 /100 WBC
NUCLEATED RED BLOOD CELLS: 0.9 /100 WBC
NUCLEATED RED BLOOD CELLS: 1.7 /100 WBC
O2 CONTENT: 11.7 ML/DL
O2 CONTENT: 11.9 ML/DL
O2 CONTENT: 12.3 ML/DL
O2 CONTENT: 12.4 ML/DL
O2 CONTENT: 13.5 ML/DL
O2 CONTENT: 14.8 ML/DL
O2 CONTENT: 9.9 ML/DL
O2 SATURATION: 97.4 % (ref 92–98.5)
O2 SATURATION: 97.5 % (ref 92–98.5)
O2 SATURATION: 97.7 % (ref 92–98.5)
O2 SATURATION: 98.5 % (ref 92–98.5)
O2 SATURATION: 98.5 % (ref 92–98.5)
O2 SATURATION: 99.2 % (ref 92–98.5)
O2 SATURATION: 99.3 % (ref 92–98.5)
O2 SATURATION: 99.4 % (ref 92–98.5)
O2 SATURATION: 99.9 % (ref 92–98.5)
O2HB: 95.8 % (ref 94–97)
O2HB: 95.9 % (ref 94–97)
O2HB: 97.7 % (ref 94–97)
O2HB: 97.7 % (ref 94–97)
O2HB: 98.6 % (ref 94–97)
O2HB: 98.7 % (ref 94–97)
O2HB: 98.7 % (ref 94–97)
O2HB: 99.5 % (ref 94–97)
OPERATOR ID: 1067
OPERATOR ID: 2260
OPERATOR ID: 2485
OPERATOR ID: 3114
OPERATOR ID: 3342
OPERATOR ID: 3342
OPERATOR ID: 913
OPERATOR ID: 914
OPERATOR ID: ABNORMAL
OPIATE SCREEN URINE: NOT DETECTED
ORDER NUMBER: ABNORMAL
ORGANISM: ABNORMAL
OSMOLALITY URINE: 282 MOSM/KG (ref 300–900)
OSMOLALITY: 312 MOSM/KG (ref 285–310)
OVALOCYTES: ABNORMAL
OXYCODONE URINE: NOT DETECTED
PATHOLOGIST REVIEW: NORMAL
PATIENT TEMP: 37 C
PCO2 ARTERIAL: 29.7 MMHG (ref 35–45)
PCO2: 16 MMHG (ref 35–45)
PCO2: 16.9 MMHG (ref 35–45)
PCO2: 20.2 MMHG (ref 35–45)
PCO2: 22.2 MMHG (ref 35–45)
PCO2: 22.6 MMHG (ref 35–45)
PCO2: 24.6 MMHG (ref 35–45)
PCO2: 28 MMHG (ref 35–45)
PCO2: 28.2 MMHG (ref 35–45)
PDW BLD-RTO: 14.6 FL (ref 11.5–15)
PDW BLD-RTO: 14.6 FL (ref 11.5–15)
PDW BLD-RTO: 15.2 FL (ref 11.5–15)
PDW BLD-RTO: 15.7 FL (ref 11.5–15)
PDW BLD-RTO: 15.9 FL (ref 11.5–15)
PDW BLD-RTO: 15.9 FL (ref 11.5–15)
PDW BLD-RTO: 16 FL (ref 11.5–15)
PDW BLD-RTO: 16 FL (ref 11.5–15)
PEEP/CPAP: 5 CMH2O
PFO2: 2.29 MMHG/%
PFO2: 2.48 MMHG/%
PFO2: 2.86 MMHG/%
PFO2: 3.21 MMHG/%
PFO2: 3.75 MMHG/%
PFO2: 3.83 MMHG/%
PH BLOOD GAS: 6.96 (ref 7.35–7.45)
PH BLOOD GAS: 7.21 (ref 7.35–7.45)
PH BLOOD GAS: 7.26 (ref 7.35–7.45)
PH BLOOD GAS: 7.37 (ref 7.35–7.45)
PH BLOOD GAS: 7.44 (ref 7.35–7.45)
PH BLOOD GAS: 7.46 (ref 7.35–7.45)
PH BLOOD GAS: 7.46 (ref 7.35–7.45)
PH BLOOD GAS: 7.65 (ref 7.35–7.45)
PH BLOOD GAS: 7.66 (ref 7.35–7.45)
PH UA: 6.5 (ref 5–9)
PHENCYCLIDINE SCREEN URINE: NOT DETECTED
PHENYTOIN DOSE AMOUNT: ABNORMAL
PHENYTOIN DOSE AMOUNT: ABNORMAL
PHENYTOIN LEVEL: 8.9 UG/ML (ref 10–20)
PHENYTOIN LEVEL: 9.6 UG/ML (ref 10–20)
PHOSPHORUS: 0.5 MG/DL (ref 2.5–4.5)
PHOSPHORUS: 1.1 MG/DL (ref 2.5–4.5)
PHOSPHORUS: 2.3 MG/DL (ref 2.5–4.5)
PHOSPHORUS: 2.3 MG/DL (ref 2.5–4.5)
PHOSPHORUS: 2.7 MG/DL (ref 2.5–4.5)
PHOSPHORUS: 2.8 MG/DL (ref 2.5–4.5)
PLATELET # BLD: 10 E9/L (ref 130–450)
PLATELET # BLD: 12 E9/L (ref 130–450)
PLATELET # BLD: 16 E9/L (ref 130–450)
PLATELET # BLD: 17 E9/L (ref 130–450)
PLATELET # BLD: 20 E9/L (ref 130–450)
PLATELET # BLD: 22 E9/L (ref 130–450)
PLATELET # BLD: 23 E9/L (ref 130–450)
PLATELET # BLD: 29 E9/L (ref 130–450)
PLATELET # BLD: 34 E9/L (ref 130–450)
PLATELET CONFIRMATION: NORMAL
PMV BLD AUTO: 11.4 FL (ref 7–12)
PMV BLD AUTO: 12.3 FL (ref 7–12)
PMV BLD AUTO: 12.5 FL (ref 7–12)
PMV BLD AUTO: 13.7 FL (ref 7–12)
PMV BLD AUTO: ABNORMAL FL (ref 7–12)
PO2 ARTERIAL: 91.2 MMHG (ref 60–80)
PO2: 114.4 MMHG (ref 75–100)
PO2: 136 MMHG (ref 75–100)
PO2: 150.2 MMHG (ref 75–100)
PO2: 160.3 MMHG (ref 75–100)
PO2: 191.5 MMHG (ref 75–100)
PO2: 91.7 MMHG (ref 75–100)
PO2: 99.3 MMHG (ref 75–100)
PO2: >500 MMHG (ref 75–100)
POIKILOCYTES: ABNORMAL
POLYCHROMASIA: ABNORMAL
POSITIVE END EXP PRESS: 5 CMH2O
POTASSIUM REFLEX MAGNESIUM: 4.4 MMOL/L (ref 3.5–5)
POTASSIUM REFLEX MAGNESIUM: 6.4 MMOL/L (ref 3.5–5)
POTASSIUM REFLEX MAGNESIUM: ABNORMAL MMOL/L (ref 3.5–5)
POTASSIUM SERPL-SCNC: 2.4 MMOL/L (ref 3.5–5)
POTASSIUM SERPL-SCNC: 3.1 MMOL/L (ref 3.5–5)
POTASSIUM SERPL-SCNC: 3.3 MMOL/L (ref 3.5–5)
POTASSIUM SERPL-SCNC: 3.4 MMOL/L (ref 3.5–5)
POTASSIUM SERPL-SCNC: 3.5 MMOL/L (ref 3.5–5)
POTASSIUM SERPL-SCNC: 3.6 MMOL/L (ref 3.5–5)
POTASSIUM SERPL-SCNC: 4 MMOL/L (ref 3.5–5)
POTASSIUM, UR: 15.2 MMOL/L
PROCALCITONIN: 11.54 NG/ML (ref 0–0.08)
PROTEIN PROTEIN: ABNORMAL MG/DL (ref 0–12)
PROTEIN UA: >=300 MG/DL
PROTEIN/CREAT RATIO: 20.7
PROTEIN/CREAT RATIO: ABNORMAL (ref 0–0.2)
PROTEUS BY PCR: NOT DETECTED
PROTHROMBIN TIME: 12.4 SEC (ref 9.3–12.4)
PROTHROMBIN TIME: 13.4 SEC (ref 9.3–12.4)
PROTHROMBIN TIME: 14.9 SEC (ref 9.3–12.4)
PSEUDOMONAS AERUGINOSA BY PCR: NOT DETECTED
RBC # BLD: 2.04 E12/L (ref 3.5–5.5)
RBC # BLD: 2.26 E12/L (ref 3.5–5.5)
RBC # BLD: 2.3 E12/L (ref 3.5–5.5)
RBC # BLD: 2.42 E12/L (ref 3.5–5.5)
RBC # BLD: 2.93 E12/L (ref 3.5–5.5)
RBC # BLD: 3.09 E12/L (ref 3.5–5.5)
RBC # BLD: 3.09 E12/L (ref 3.5–5.5)
RBC # BLD: 3.59 E12/L (ref 3.5–5.5)
RBC UA: ABNORMAL /HPF (ref 0–2)
RESPIRATORY RATE: 12 B/MIN
RETIC HGB EQUIVALENT: 30.4 PG (ref 28.2–36.6)
RETICULOCYTE ABSOLUTE COUNT: 0.04 E12/L
RETICULOCYTE COUNT PCT: 1.5 % (ref 0.4–1.9)
RI(T): 100 %
RI(T): 116 %
RI(T): 145 %
RI(T): 165 %
RI(T): 70 %
RI(T): 70 %
RR MECHANICAL: 12 B/MIN
RR MECHANICAL: 16 B/MIN
RR MECHANICAL: 25 B/MIN
SALICYLATE, SERUM: <0.3 MG/DL (ref 0–30)
SARS-COV-2, NAAT: NOT DETECTED
SCHISTOCYTES: ABNORMAL
SCHISTOCYTES: ABNORMAL
SEDIMENTATION RATE, ERYTHROCYTE: 67 MM/HR (ref 0–20)
SERRATIA MARCESCENS BY PCR: NOT DETECTED
SMEAR, RESPIRATORY: ABNORMAL
SODIUM BLD-SCNC: 128 MMOL/L (ref 132–146)
SODIUM BLD-SCNC: 130 MMOL/L (ref 132–146)
SODIUM BLD-SCNC: 131 MMOL/L (ref 132–146)
SODIUM BLD-SCNC: 132 MMOL/L (ref 132–146)
SODIUM BLD-SCNC: 132 MMOL/L (ref 132–146)
SODIUM BLD-SCNC: 134 MMOL/L (ref 132–146)
SODIUM BLD-SCNC: 137 MMOL/L (ref 132–146)
SODIUM BLD-SCNC: ABNORMAL MMOL/L (ref 132–146)
SODIUM URINE: 103 MMOL/L
SOURCE OF BLOOD CULTURE: ABNORMAL
SOURCE, BLOOD GAS: ABNORMAL
SPECIFIC GRAVITY UA: 1.02 (ref 1–1.03)
STAPHYLOCOCCUS AUREUS BY PCR: NOT DETECTED
STAPHYLOCOCCUS SPECIES BY PCR: NOT DETECTED
STREPTOCOCCUS AGALACTIAE BY PCR: NOT DETECTED
STREPTOCOCCUS PNEUMONIAE BY PCR: NOT DETECTED
STREPTOCOCCUS PYOGENES  BY PCR: NOT DETECTED
STREPTOCOCCUS SPECIES BY PCR: NOT DETECTED
TARGET CELLS: ABNORMAL
THB: 10.9 G/DL (ref 11.5–16.5)
THB: 7.2 G/DL (ref 11.5–16.5)
THB: 8.2 G/DL (ref 11.5–16.5)
THB: 8.3 G/DL (ref 11.5–16.5)
THB: 8.6 G/DL (ref 11.5–16.5)
THB: 8.8 G/DL (ref 11.5–16.5)
THB: 9.6 G/DL (ref 11.5–16.5)
THB: 9.6 G/DL (ref 11.5–16.5)
TIDAL VOLUME: 350 ML
TIME ANALYZED: 1148
TIME ANALYZED: 1336
TIME ANALYZED: 2219
TIME ANALYZED: 2314
TIME ANALYZED: 512
TIME ANALYZED: 515
TIME ANALYZED: 529
TIME ANALYZED: 612
TOTAL CK: 44 U/L (ref 20–180)
TOTAL IRON BINDING CAPACITY: 111 MCG/DL (ref 250–450)
TOTAL PROTEIN: 5.2 G/DL (ref 6.4–8.3)
TOTAL PROTEIN: 5.4 G/DL (ref 6.4–8.3)
TOTAL PROTEIN: 5.5 G/DL (ref 6.4–8.3)
TOTAL PROTEIN: 5.6 G/DL (ref 6.4–8.3)
TOTAL PROTEIN: 5.9 G/DL (ref 6.4–8.3)
TOTAL PROTEIN: 6.1 G/DL (ref 6.4–8.3)
TOTAL PROTEIN: 6.2 G/DL (ref 6.4–8.3)
TOTAL PROTEIN: 7.2 G/DL (ref 6.4–8.3)
TOTAL PROTEIN: ABNORMAL G/DL (ref 6.4–8.3)
TOXIC GRANULATION: ABNORMAL
TRICYCLIC ANTIDEPRESSANTS SCREEN SERUM: POSITIVE NG/ML
URINE CULTURE, ROUTINE: ABNORMAL
UROBILINOGEN, URINE: 0.2 E.U./DL
VACUOLATED NEUTROPHILS: ABNORMAL
VITAMIN B-12: >2000 PG/ML (ref 211–946)
VT MECHANICAL: 350 ML
VT MECHANICAL: 400 ML
VT MECHANICAL: 600 ML
WBC # BLD: 11 E9/L (ref 4.5–11.5)
WBC # BLD: 12.4 E9/L (ref 4.5–11.5)
WBC # BLD: 12.5 E9/L (ref 4.5–11.5)
WBC # BLD: 14.5 E9/L (ref 4.5–11.5)
WBC # BLD: 16 E9/L (ref 4.5–11.5)
WBC # BLD: 16.5 E9/L (ref 4.5–11.5)
WBC # BLD: 16.5 E9/L (ref 4.5–11.5)
WBC # BLD: 17.3 E9/L (ref 4.5–11.5)
WBC UA: ABNORMAL /HPF (ref 0–5)

## 2020-01-01 PROCEDURE — 94002 VENT MGMT INPAT INIT DAY: CPT

## 2020-01-01 PROCEDURE — 99233 SBSQ HOSP IP/OBS HIGH 50: CPT | Performed by: FAMILY MEDICINE

## 2020-01-01 PROCEDURE — 36415 COLL VENOUS BLD VENIPUNCTURE: CPT

## 2020-01-01 PROCEDURE — 86870 RBC ANTIBODY IDENTIFICATION: CPT

## 2020-01-01 PROCEDURE — 2580000003 HC RX 258: Performed by: INTERNAL MEDICINE

## 2020-01-01 PROCEDURE — 6370000000 HC RX 637 (ALT 250 FOR IP): Performed by: FAMILY MEDICINE

## 2020-01-01 PROCEDURE — 87150 DNA/RNA AMPLIFIED PROBE: CPT

## 2020-01-01 PROCEDURE — 36430 TRANSFUSION BLD/BLD COMPNT: CPT

## 2020-01-01 PROCEDURE — 90935 HEMODIALYSIS ONE EVALUATION: CPT

## 2020-01-01 PROCEDURE — 99233 SBSQ HOSP IP/OBS HIGH 50: CPT | Performed by: INTERNAL MEDICINE

## 2020-01-01 PROCEDURE — 2000000000 HC ICU R&B

## 2020-01-01 PROCEDURE — 82570 ASSAY OF URINE CREATININE: CPT

## 2020-01-01 PROCEDURE — 82805 BLOOD GASES W/O2 SATURATION: CPT

## 2020-01-01 PROCEDURE — 85730 THROMBOPLASTIN TIME PARTIAL: CPT

## 2020-01-01 PROCEDURE — 2580000003 HC RX 258: Performed by: SPECIALIST

## 2020-01-01 PROCEDURE — 71045 X-RAY EXAM CHEST 1 VIEW: CPT

## 2020-01-01 PROCEDURE — 83735 ASSAY OF MAGNESIUM: CPT

## 2020-01-01 PROCEDURE — P9047 ALBUMIN (HUMAN), 25%, 50ML: HCPCS | Performed by: INTERNAL MEDICINE

## 2020-01-01 PROCEDURE — 85025 COMPLETE CBC W/AUTO DIFF WBC: CPT

## 2020-01-01 PROCEDURE — 85378 FIBRIN DEGRADE SEMIQUANT: CPT

## 2020-01-01 PROCEDURE — 99222 1ST HOSP IP/OBS MODERATE 55: CPT | Performed by: SURGERY

## 2020-01-01 PROCEDURE — 83930 ASSAY OF BLOOD OSMOLALITY: CPT

## 2020-01-01 PROCEDURE — 96374 THER/PROPH/DIAG INJ IV PUSH: CPT

## 2020-01-01 PROCEDURE — 74176 CT ABD & PELVIS W/O CONTRAST: CPT

## 2020-01-01 PROCEDURE — 6360000002 HC RX W HCPCS: Performed by: INTERNAL MEDICINE

## 2020-01-01 PROCEDURE — 51702 INSERT TEMP BLADDER CATH: CPT

## 2020-01-01 PROCEDURE — 37799 UNLISTED PX VASCULAR SURGERY: CPT

## 2020-01-01 PROCEDURE — 85610 PROTHROMBIN TIME: CPT

## 2020-01-01 PROCEDURE — 6370000000 HC RX 637 (ALT 250 FOR IP): Performed by: INTERNAL MEDICINE

## 2020-01-01 PROCEDURE — 87081 CULTURE SCREEN ONLY: CPT

## 2020-01-01 PROCEDURE — 83540 ASSAY OF IRON: CPT

## 2020-01-01 PROCEDURE — 2580000003 HC RX 258: Performed by: FAMILY MEDICINE

## 2020-01-01 PROCEDURE — 85397 CLOTTING FUNCT ACTIVITY: CPT

## 2020-01-01 PROCEDURE — 85045 AUTOMATED RETICULOCYTE COUNT: CPT

## 2020-01-01 PROCEDURE — 72125 CT NECK SPINE W/O DYE: CPT

## 2020-01-01 PROCEDURE — 6360000002 HC RX W HCPCS

## 2020-01-01 PROCEDURE — 86901 BLOOD TYPING SEROLOGIC RH(D): CPT

## 2020-01-01 PROCEDURE — 83010 ASSAY OF HAPTOGLOBIN QUANT: CPT

## 2020-01-01 PROCEDURE — 84145 PROCALCITONIN (PCT): CPT

## 2020-01-01 PROCEDURE — 83935 ASSAY OF URINE OSMOLALITY: CPT

## 2020-01-01 PROCEDURE — 80053 COMPREHEN METABOLIC PANEL: CPT

## 2020-01-01 PROCEDURE — C9113 INJ PANTOPRAZOLE SODIUM, VIA: HCPCS | Performed by: INTERNAL MEDICINE

## 2020-01-01 PROCEDURE — 82550 ASSAY OF CK (CPK): CPT

## 2020-01-01 PROCEDURE — 6360000002 HC RX W HCPCS: Performed by: FAMILY MEDICINE

## 2020-01-01 PROCEDURE — 83690 ASSAY OF LIPASE: CPT

## 2020-01-01 PROCEDURE — 31500 INSERT EMERGENCY AIRWAY: CPT

## 2020-01-01 PROCEDURE — 5A1955Z RESPIRATORY VENTILATION, GREATER THAN 96 CONSECUTIVE HOURS: ICD-10-PCS | Performed by: INTERNAL MEDICINE

## 2020-01-01 PROCEDURE — 84100 ASSAY OF PHOSPHORUS: CPT

## 2020-01-01 PROCEDURE — 86880 COOMBS TEST DIRECT: CPT

## 2020-01-01 PROCEDURE — 2500000003 HC RX 250 WO HCPCS: Performed by: INTERNAL MEDICINE

## 2020-01-01 PROCEDURE — 87040 BLOOD CULTURE FOR BACTERIA: CPT

## 2020-01-01 PROCEDURE — 86922 COMPATIBILITY TEST ANTIGLOB: CPT

## 2020-01-01 PROCEDURE — 2060000000 HC ICU INTERMEDIATE R&B

## 2020-01-01 PROCEDURE — 76770 US EXAM ABDO BACK WALL COMP: CPT

## 2020-01-01 PROCEDURE — 83615 LACTATE (LD) (LDH) ENZYME: CPT

## 2020-01-01 PROCEDURE — 83605 ASSAY OF LACTIC ACID: CPT

## 2020-01-01 PROCEDURE — 94003 VENT MGMT INPAT SUBQ DAY: CPT

## 2020-01-01 PROCEDURE — 82962 GLUCOSE BLOOD TEST: CPT

## 2020-01-01 PROCEDURE — 87077 CULTURE AEROBIC IDENTIFY: CPT

## 2020-01-01 PROCEDURE — 2580000003 HC RX 258: Performed by: EMERGENCY MEDICINE

## 2020-01-01 PROCEDURE — 93005 ELECTROCARDIOGRAM TRACING: CPT | Performed by: EMERGENCY MEDICINE

## 2020-01-01 PROCEDURE — 80307 DRUG TEST PRSMV CHEM ANLYZR: CPT

## 2020-01-01 PROCEDURE — 2500000003 HC RX 250 WO HCPCS

## 2020-01-01 PROCEDURE — 70450 CT HEAD/BRAIN W/O DYE: CPT

## 2020-01-01 PROCEDURE — 86850 RBC ANTIBODY SCREEN: CPT

## 2020-01-01 PROCEDURE — 71250 CT THORAX DX C-: CPT

## 2020-01-01 PROCEDURE — 94640 AIRWAY INHALATION TREATMENT: CPT

## 2020-01-01 PROCEDURE — 86900 BLOOD TYPING SEROLOGIC ABO: CPT

## 2020-01-01 PROCEDURE — 6360000002 HC RX W HCPCS: Performed by: STUDENT IN AN ORGANIZED HEALTH CARE EDUCATION/TRAINING PROGRAM

## 2020-01-01 PROCEDURE — 80048 BASIC METABOLIC PNL TOTAL CA: CPT

## 2020-01-01 PROCEDURE — 85384 FIBRINOGEN ACTIVITY: CPT

## 2020-01-01 PROCEDURE — 82746 ASSAY OF FOLIC ACID SERUM: CPT

## 2020-01-01 PROCEDURE — 93005 ELECTROCARDIOGRAM TRACING: CPT | Performed by: INTERNAL MEDICINE

## 2020-01-01 PROCEDURE — 06HM33Z INSERTION OF INFUSION DEVICE INTO RIGHT FEMORAL VEIN, PERCUTANEOUS APPROACH: ICD-10-PCS | Performed by: STUDENT IN AN ORGANIZED HEALTH CARE EDUCATION/TRAINING PROGRAM

## 2020-01-01 PROCEDURE — 86140 C-REACTIVE PROTEIN: CPT

## 2020-01-01 PROCEDURE — 87088 URINE BACTERIA CULTURE: CPT

## 2020-01-01 PROCEDURE — 82436 ASSAY OF URINE CHLORIDE: CPT

## 2020-01-01 PROCEDURE — 99223 1ST HOSP IP/OBS HIGH 75: CPT | Performed by: FAMILY MEDICINE

## 2020-01-01 PROCEDURE — 99285 EMERGENCY DEPT VISIT HI MDM: CPT

## 2020-01-01 PROCEDURE — 87070 CULTURE OTHR SPECIMN AEROBIC: CPT

## 2020-01-01 PROCEDURE — 31500 INSERT EMERGENCY AIRWAY: CPT | Performed by: INTERNAL MEDICINE

## 2020-01-01 PROCEDURE — 87206 SMEAR FLUORESCENT/ACID STAI: CPT

## 2020-01-01 PROCEDURE — 93010 ELECTROCARDIOGRAM REPORT: CPT | Performed by: INTERNAL MEDICINE

## 2020-01-01 PROCEDURE — 80185 ASSAY OF PHENYTOIN TOTAL: CPT

## 2020-01-01 PROCEDURE — 36556 INSERT NON-TUNNEL CV CATH: CPT

## 2020-01-01 PROCEDURE — 85049 AUTOMATED PLATELET COUNT: CPT

## 2020-01-01 PROCEDURE — 51798 US URINE CAPACITY MEASURE: CPT

## 2020-01-01 PROCEDURE — P9035 PLATELET PHERES LEUKOREDUCED: HCPCS

## 2020-01-01 PROCEDURE — 99284 EMERGENCY DEPT VISIT MOD MDM: CPT

## 2020-01-01 PROCEDURE — 96361 HYDRATE IV INFUSION ADD-ON: CPT

## 2020-01-01 PROCEDURE — 0BH17EZ INSERTION OF ENDOTRACHEAL AIRWAY INTO TRACHEA, VIA NATURAL OR ARTIFICIAL OPENING: ICD-10-PCS | Performed by: INTERNAL MEDICINE

## 2020-01-01 PROCEDURE — C1752 CATH,HEMODIALYSIS,SHORT-TERM: HCPCS

## 2020-01-01 PROCEDURE — 84156 ASSAY OF PROTEIN URINE: CPT

## 2020-01-01 PROCEDURE — 80074 ACUTE HEPATITIS PANEL: CPT

## 2020-01-01 PROCEDURE — 6360000002 HC RX W HCPCS: Performed by: SPECIALIST

## 2020-01-01 PROCEDURE — 5A1D70Z PERFORMANCE OF URINARY FILTRATION, INTERMITTENT, LESS THAN 6 HOURS PER DAY: ICD-10-PCS | Performed by: INTERNAL MEDICINE

## 2020-01-01 PROCEDURE — 86706 HEP B SURFACE ANTIBODY: CPT

## 2020-01-01 PROCEDURE — 84133 ASSAY OF URINE POTASSIUM: CPT

## 2020-01-01 PROCEDURE — 86905 BLOOD TYPING RBC ANTIGENS: CPT

## 2020-01-01 PROCEDURE — 74018 RADEX ABDOMEN 1 VIEW: CPT

## 2020-01-01 PROCEDURE — 36620 INSERTION CATHETER ARTERY: CPT

## 2020-01-01 PROCEDURE — 85651 RBC SED RATE NONAUTOMATED: CPT

## 2020-01-01 PROCEDURE — 87186 SC STD MICRODIL/AGAR DIL: CPT

## 2020-01-01 PROCEDURE — 82330 ASSAY OF CALCIUM: CPT

## 2020-01-01 PROCEDURE — P9016 RBC LEUKOCYTES REDUCED: HCPCS

## 2020-01-01 PROCEDURE — 2580000003 HC RX 258

## 2020-01-01 PROCEDURE — 99239 HOSP IP/OBS DSCHRG MGMT >30: CPT | Performed by: INTERNAL MEDICINE

## 2020-01-01 PROCEDURE — U0002 COVID-19 LAB TEST NON-CDC: HCPCS

## 2020-01-01 PROCEDURE — 82803 BLOOD GASES ANY COMBINATION: CPT

## 2020-01-01 PROCEDURE — 84300 ASSAY OF URINE SODIUM: CPT

## 2020-01-01 PROCEDURE — 81001 URINALYSIS AUTO W/SCOPE: CPT

## 2020-01-01 PROCEDURE — 83550 IRON BINDING TEST: CPT

## 2020-01-01 PROCEDURE — G0480 DRUG TEST DEF 1-7 CLASSES: HCPCS

## 2020-01-01 PROCEDURE — 83036 HEMOGLOBIN GLYCOSYLATED A1C: CPT

## 2020-01-01 PROCEDURE — 82010 KETONE BODYS QUAN: CPT

## 2020-01-01 PROCEDURE — 82607 VITAMIN B-12: CPT

## 2020-01-01 PROCEDURE — 82533 TOTAL CORTISOL: CPT

## 2020-01-01 PROCEDURE — 6360000002 HC RX W HCPCS: Performed by: EMERGENCY MEDICINE

## 2020-01-01 PROCEDURE — 80076 HEPATIC FUNCTION PANEL: CPT

## 2020-01-01 PROCEDURE — 82728 ASSAY OF FERRITIN: CPT

## 2020-01-01 PROCEDURE — 82140 ASSAY OF AMMONIA: CPT

## 2020-01-01 PROCEDURE — 94664 DEMO&/EVAL PT USE INHALER: CPT

## 2020-01-01 PROCEDURE — 93306 TTE W/DOPPLER COMPLETE: CPT

## 2020-01-01 RX ORDER — LORAZEPAM 1 MG/1
1 TABLET ORAL
Status: DISCONTINUED | OUTPATIENT
Start: 2020-01-01 | End: 2020-01-01 | Stop reason: HOSPADM

## 2020-01-01 RX ORDER — SODIUM CHLORIDE 0.9 % (FLUSH) 0.9 %
10 SYRINGE (ML) INJECTION EVERY 12 HOURS SCHEDULED
Status: DISCONTINUED | OUTPATIENT
Start: 2020-01-01 | End: 2020-01-01

## 2020-01-01 RX ORDER — ALBUMIN (HUMAN) 12.5 G/50ML
25 SOLUTION INTRAVENOUS SEE ADMIN INSTRUCTIONS
Status: DISCONTINUED | OUTPATIENT
Start: 2020-01-01 | End: 2020-01-01 | Stop reason: HOSPADM

## 2020-01-01 RX ORDER — SODIUM CHLORIDE 9 MG/ML
INJECTION, SOLUTION INTRAVENOUS CONTINUOUS
Status: DISCONTINUED | OUTPATIENT
Start: 2020-01-01 | End: 2020-01-01

## 2020-01-01 RX ORDER — POTASSIUM CHLORIDE 29.8 MG/ML
20 INJECTION INTRAVENOUS
Status: DISCONTINUED | OUTPATIENT
Start: 2020-01-01 | End: 2020-01-01

## 2020-01-01 RX ORDER — 0.9 % SODIUM CHLORIDE 0.9 %
1000 INTRAVENOUS SOLUTION INTRAVENOUS ONCE
Status: COMPLETED | OUTPATIENT
Start: 2020-01-01 | End: 2020-01-01

## 2020-01-01 RX ORDER — LORAZEPAM 2 MG/ML
1 INJECTION INTRAMUSCULAR
Status: DISCONTINUED | OUTPATIENT
Start: 2020-01-01 | End: 2020-01-01 | Stop reason: HOSPADM

## 2020-01-01 RX ORDER — DEXTROSE MONOHYDRATE 25 G/50ML
12.5 INJECTION, SOLUTION INTRAVENOUS PRN
Status: DISCONTINUED | OUTPATIENT
Start: 2020-01-01 | End: 2020-01-01 | Stop reason: HOSPADM

## 2020-01-01 RX ORDER — LABETALOL HYDROCHLORIDE 5 MG/ML
10 INJECTION, SOLUTION INTRAVENOUS ONCE
Status: COMPLETED | OUTPATIENT
Start: 2020-01-01 | End: 2020-01-01

## 2020-01-01 RX ORDER — FLUMAZENIL 0.1 MG/ML
INJECTION, SOLUTION INTRAVENOUS
Status: COMPLETED
Start: 2020-01-01 | End: 2020-01-01

## 2020-01-01 RX ORDER — ADENOSINE 3 MG/ML
6 INJECTION, SOLUTION INTRAVENOUS ONCE
Status: COMPLETED | OUTPATIENT
Start: 2020-01-01 | End: 2020-01-01

## 2020-01-01 RX ORDER — LORAZEPAM 1 MG/1
4 TABLET ORAL
Status: DISCONTINUED | OUTPATIENT
Start: 2020-01-01 | End: 2020-01-01 | Stop reason: HOSPADM

## 2020-01-01 RX ORDER — 0.9 % SODIUM CHLORIDE 0.9 %
20 INTRAVENOUS SOLUTION INTRAVENOUS ONCE
Status: DISCONTINUED | OUTPATIENT
Start: 2020-01-01 | End: 2020-01-01

## 2020-01-01 RX ORDER — SODIUM CHLORIDE 9 MG/ML
INJECTION, SOLUTION INTRAVENOUS EVERY 12 HOURS
Status: DISCONTINUED | OUTPATIENT
Start: 2020-01-01 | End: 2020-01-01

## 2020-01-01 RX ORDER — MAGNESIUM SULFATE IN WATER 40 MG/ML
2 INJECTION, SOLUTION INTRAVENOUS ONCE
Status: COMPLETED | OUTPATIENT
Start: 2020-01-01 | End: 2020-01-01

## 2020-01-01 RX ORDER — PROMETHAZINE HYDROCHLORIDE 25 MG/1
12.5 TABLET ORAL EVERY 6 HOURS PRN
Status: DISCONTINUED | OUTPATIENT
Start: 2020-01-01 | End: 2020-01-01 | Stop reason: HOSPADM

## 2020-01-01 RX ORDER — DOXEPIN HYDROCHLORIDE 50 MG/1
50 CAPSULE ORAL NIGHTLY
COMMUNITY

## 2020-01-01 RX ORDER — MAGNESIUM SULFATE 1 G/100ML
1 INJECTION INTRAVENOUS ONCE
Status: COMPLETED | OUTPATIENT
Start: 2020-01-01 | End: 2020-01-01

## 2020-01-01 RX ORDER — LISINOPRIL 5 MG/1
5 TABLET ORAL DAILY
Status: DISCONTINUED | OUTPATIENT
Start: 2020-01-01 | End: 2020-01-01

## 2020-01-01 RX ORDER — LORAZEPAM 2 MG/ML
3 INJECTION INTRAMUSCULAR
Status: DISCONTINUED | OUTPATIENT
Start: 2020-01-01 | End: 2020-01-01 | Stop reason: HOSPADM

## 2020-01-01 RX ORDER — ALBUMIN (HUMAN) 12.5 G/50ML
25 SOLUTION INTRAVENOUS EVERY 6 HOURS
Status: COMPLETED | OUTPATIENT
Start: 2020-01-01 | End: 2020-01-01

## 2020-01-01 RX ORDER — IPRATROPIUM BROMIDE AND ALBUTEROL SULFATE 2.5; .5 MG/3ML; MG/3ML
1 SOLUTION RESPIRATORY (INHALATION) EVERY 4 HOURS
Status: DISCONTINUED | OUTPATIENT
Start: 2020-01-01 | End: 2020-01-01

## 2020-01-01 RX ORDER — ACETAMINOPHEN 325 MG/1
650 TABLET ORAL EVERY 6 HOURS PRN
Status: DISCONTINUED | OUTPATIENT
Start: 2020-01-01 | End: 2020-01-01 | Stop reason: HOSPADM

## 2020-01-01 RX ORDER — GLYCOPYRROLATE 0.2 MG/ML
0.2 INJECTION INTRAMUSCULAR; INTRAVENOUS EVERY 4 HOURS PRN
Status: DISCONTINUED | OUTPATIENT
Start: 2020-01-01 | End: 2020-01-01 | Stop reason: HOSPADM

## 2020-01-01 RX ORDER — CHLORHEXIDINE GLUCONATE 0.12 MG/ML
15 RINSE ORAL 2 TIMES DAILY
Status: DISCONTINUED | OUTPATIENT
Start: 2020-01-01 | End: 2020-01-01 | Stop reason: HOSPADM

## 2020-01-01 RX ORDER — LORAZEPAM 2 MG/ML
4 INJECTION INTRAMUSCULAR
Status: DISCONTINUED | OUTPATIENT
Start: 2020-01-01 | End: 2020-01-01 | Stop reason: HOSPADM

## 2020-01-01 RX ORDER — ONDANSETRON 2 MG/ML
4 INJECTION INTRAMUSCULAR; INTRAVENOUS EVERY 6 HOURS PRN
Status: DISCONTINUED | OUTPATIENT
Start: 2020-01-01 | End: 2020-01-01 | Stop reason: HOSPADM

## 2020-01-01 RX ORDER — HYDRALAZINE HYDROCHLORIDE 20 MG/ML
5 INJECTION INTRAMUSCULAR; INTRAVENOUS EVERY 6 HOURS PRN
Status: DISCONTINUED | OUTPATIENT
Start: 2020-01-01 | End: 2020-01-01 | Stop reason: HOSPADM

## 2020-01-01 RX ORDER — FLUMAZENIL 0.1 MG/ML
0.2 INJECTION, SOLUTION INTRAVENOUS ONCE
Status: DISCONTINUED | OUTPATIENT
Start: 2020-01-01 | End: 2020-01-01

## 2020-01-01 RX ORDER — SODIUM CHLORIDE 0.9 % (FLUSH) 0.9 %
10 SYRINGE (ML) INJECTION PRN
Status: DISCONTINUED | OUTPATIENT
Start: 2020-01-01 | End: 2020-01-01 | Stop reason: HOSPADM

## 2020-01-01 RX ORDER — ADENOSINE 3 MG/ML
INJECTION, SOLUTION INTRAVENOUS
Status: DISPENSED
Start: 2020-01-01 | End: 2020-01-01

## 2020-01-01 RX ORDER — MORPHINE SULFATE 4 MG/ML
4 INJECTION, SOLUTION INTRAMUSCULAR; INTRAVENOUS ONCE
Status: COMPLETED | OUTPATIENT
Start: 2020-01-01 | End: 2020-01-01

## 2020-01-01 RX ORDER — SODIUM CHLORIDE 9 MG/ML
10 INJECTION INTRAVENOUS DAILY
Status: DISCONTINUED | OUTPATIENT
Start: 2020-01-01 | End: 2020-01-01 | Stop reason: HOSPADM

## 2020-01-01 RX ORDER — PHENYTOIN SODIUM 100 MG/1
100 CAPSULE, EXTENDED RELEASE ORAL 2 TIMES DAILY
Status: DISCONTINUED | OUTPATIENT
Start: 2020-01-01 | End: 2020-01-01

## 2020-01-01 RX ORDER — PHENYTOIN SODIUM 100 MG/1
CAPSULE, EXTENDED RELEASE ORAL 2 TIMES DAILY
COMMUNITY

## 2020-01-01 RX ORDER — NICOTINE POLACRILEX 4 MG
15 LOZENGE BUCCAL PRN
Status: DISCONTINUED | OUTPATIENT
Start: 2020-01-01 | End: 2020-01-01 | Stop reason: HOSPADM

## 2020-01-01 RX ORDER — SODIUM CHLORIDE 9 MG/ML
INJECTION, SOLUTION INTRAVENOUS CONTINUOUS
Status: DISCONTINUED | OUTPATIENT
Start: 2020-01-01 | End: 2020-01-01 | Stop reason: HOSPADM

## 2020-01-01 RX ORDER — DULOXETIN HYDROCHLORIDE 30 MG/1
30 CAPSULE, DELAYED RELEASE ORAL DAILY
COMMUNITY

## 2020-01-01 RX ORDER — CALCIUM GLUCONATE 94 MG/ML
1 INJECTION, SOLUTION INTRAVENOUS ONCE
Status: COMPLETED | OUTPATIENT
Start: 2020-01-01 | End: 2020-01-01

## 2020-01-01 RX ORDER — NICOTINE 21 MG/24HR
1 PATCH, TRANSDERMAL 24 HOURS TRANSDERMAL DAILY
Status: DISCONTINUED | OUTPATIENT
Start: 2020-01-01 | End: 2020-01-01 | Stop reason: HOSPADM

## 2020-01-01 RX ORDER — FUROSEMIDE 10 MG/ML
INJECTION INTRAMUSCULAR; INTRAVENOUS
Status: DISPENSED
Start: 2020-01-01 | End: 2020-01-01

## 2020-01-01 RX ORDER — FUROSEMIDE 10 MG/ML
40 INJECTION INTRAMUSCULAR; INTRAVENOUS ONCE
Status: COMPLETED | OUTPATIENT
Start: 2020-01-01 | End: 2020-01-01

## 2020-01-01 RX ORDER — POTASSIUM CHLORIDE 29.8 MG/ML
20 INJECTION INTRAVENOUS
Status: COMPLETED | OUTPATIENT
Start: 2020-01-01 | End: 2020-01-01

## 2020-01-01 RX ORDER — IPRATROPIUM BROMIDE AND ALBUTEROL SULFATE 2.5; .5 MG/3ML; MG/3ML
1 SOLUTION RESPIRATORY (INHALATION) EVERY 4 HOURS
Status: DISCONTINUED | OUTPATIENT
Start: 2020-01-01 | End: 2020-01-01 | Stop reason: HOSPADM

## 2020-01-01 RX ORDER — LORAZEPAM 2 MG/ML
2 INJECTION INTRAMUSCULAR
Status: DISCONTINUED | OUTPATIENT
Start: 2020-01-01 | End: 2020-01-01 | Stop reason: HOSPADM

## 2020-01-01 RX ORDER — LISINOPRIL 5 MG/1
5 TABLET ORAL DAILY
COMMUNITY

## 2020-01-01 RX ORDER — 0.9 % SODIUM CHLORIDE 0.9 %
30 INTRAVENOUS SOLUTION INTRAVENOUS ONCE
Status: COMPLETED | OUTPATIENT
Start: 2020-01-01 | End: 2020-01-01

## 2020-01-01 RX ORDER — POTASSIUM CHLORIDE 29.8 MG/ML
20 INJECTION INTRAVENOUS ONCE
Status: COMPLETED | OUTPATIENT
Start: 2020-01-01 | End: 2020-01-01

## 2020-01-01 RX ORDER — HYDROXYZINE PAMOATE 50 MG/1
50 CAPSULE ORAL 3 TIMES DAILY PRN
COMMUNITY

## 2020-01-01 RX ORDER — DEXTROSE MONOHYDRATE 50 MG/ML
100 INJECTION, SOLUTION INTRAVENOUS PRN
Status: DISCONTINUED | OUTPATIENT
Start: 2020-01-01 | End: 2020-01-01 | Stop reason: HOSPADM

## 2020-01-01 RX ORDER — LANOLIN ALCOHOL/MO/W.PET/CERES
1000 CREAM (GRAM) TOPICAL DAILY
Status: DISCONTINUED | OUTPATIENT
Start: 2020-01-01 | End: 2020-01-01 | Stop reason: HOSPADM

## 2020-01-01 RX ORDER — 0.9 % SODIUM CHLORIDE 0.9 %
250 INTRAVENOUS SOLUTION INTRAVENOUS ONCE
Status: DISCONTINUED | OUTPATIENT
Start: 2020-01-01 | End: 2020-01-01

## 2020-01-01 RX ORDER — 0.9 % SODIUM CHLORIDE 0.9 %
500 INTRAVENOUS SOLUTION INTRAVENOUS ONCE
Status: COMPLETED | OUTPATIENT
Start: 2020-01-01 | End: 2020-01-01

## 2020-01-01 RX ORDER — LORAZEPAM 1 MG/1
3 TABLET ORAL
Status: DISCONTINUED | OUTPATIENT
Start: 2020-01-01 | End: 2020-01-01 | Stop reason: HOSPADM

## 2020-01-01 RX ORDER — DILTIAZEM HYDROCHLORIDE 5 MG/ML
INJECTION INTRAVENOUS
Status: DISPENSED
Start: 2020-01-01 | End: 2020-01-01

## 2020-01-01 RX ORDER — PANTOPRAZOLE SODIUM 40 MG/10ML
40 INJECTION, POWDER, LYOPHILIZED, FOR SOLUTION INTRAVENOUS DAILY
Status: DISCONTINUED | OUTPATIENT
Start: 2020-01-01 | End: 2020-01-01 | Stop reason: HOSPADM

## 2020-01-01 RX ORDER — SODIUM CHLORIDE 0.9 % (FLUSH) 0.9 %
10 SYRINGE (ML) INJECTION EVERY 12 HOURS SCHEDULED
Status: DISCONTINUED | OUTPATIENT
Start: 2020-01-01 | End: 2020-01-01 | Stop reason: HOSPADM

## 2020-01-01 RX ORDER — THIAMINE HYDROCHLORIDE 100 MG/ML
100 INJECTION, SOLUTION INTRAMUSCULAR; INTRAVENOUS DAILY
Status: DISCONTINUED | OUTPATIENT
Start: 2020-01-01 | End: 2020-01-01 | Stop reason: HOSPADM

## 2020-01-01 RX ORDER — ANTICOAGULANT SODIUM CITRATE SOLUTION 4 G/100ML
4.3 SOLUTION INTRAVENOUS DAILY PRN
Status: DISCONTINUED | OUTPATIENT
Start: 2020-01-01 | End: 2020-01-01 | Stop reason: HOSPADM

## 2020-01-01 RX ORDER — LORAZEPAM 1 MG/1
2 TABLET ORAL
Status: DISCONTINUED | OUTPATIENT
Start: 2020-01-01 | End: 2020-01-01 | Stop reason: HOSPADM

## 2020-01-01 RX ORDER — FOLIC ACID 1 MG/1
1 TABLET ORAL DAILY
Status: DISCONTINUED | OUTPATIENT
Start: 2020-01-01 | End: 2020-01-01 | Stop reason: HOSPADM

## 2020-01-01 RX ORDER — HEPARIN SODIUM 10000 [USP'U]/ML
5000 INJECTION, SOLUTION INTRAVENOUS; SUBCUTANEOUS EVERY 8 HOURS SCHEDULED
Status: DISCONTINUED | OUTPATIENT
Start: 2020-01-01 | End: 2020-01-01

## 2020-01-01 RX ORDER — ACETAMINOPHEN 650 MG/1
650 SUPPOSITORY RECTAL EVERY 6 HOURS PRN
Status: DISCONTINUED | OUTPATIENT
Start: 2020-01-01 | End: 2020-01-01 | Stop reason: HOSPADM

## 2020-01-01 RX ORDER — POTASSIUM CHLORIDE 29.8 MG/ML
INJECTION INTRAVENOUS
Status: DISPENSED
Start: 2020-01-01 | End: 2020-01-01

## 2020-01-01 RX ORDER — POLYETHYLENE GLYCOL 3350 17 G/17G
17 POWDER, FOR SOLUTION ORAL DAILY PRN
Status: DISCONTINUED | OUTPATIENT
Start: 2020-01-01 | End: 2020-01-01 | Stop reason: HOSPADM

## 2020-01-01 RX ADMIN — CHLORHEXIDINE GLUCONATE 0.12% ORAL RINSE 15 ML: 1.2 LIQUID ORAL at 09:50

## 2020-01-01 RX ADMIN — IPRATROPIUM BROMIDE AND ALBUTEROL SULFATE 1 AMPULE: .5; 3 SOLUTION RESPIRATORY (INHALATION) at 12:30

## 2020-01-01 RX ADMIN — SODIUM CHLORIDE, PRESERVATIVE FREE 10 ML: 5 INJECTION INTRAVENOUS at 09:43

## 2020-01-01 RX ADMIN — THIAMINE HYDROCHLORIDE 100 MG: 100 INJECTION, SOLUTION INTRAMUSCULAR; INTRAVENOUS at 09:43

## 2020-01-01 RX ADMIN — CHLORHEXIDINE GLUCONATE 0.12% ORAL RINSE 15 ML: 1.2 LIQUID ORAL at 20:11

## 2020-01-01 RX ADMIN — PHENYLEPHRINE HYDROCHLORIDE 200 MCG/MIN: 10 INJECTION INTRAVENOUS at 17:16

## 2020-01-01 RX ADMIN — SODIUM CHLORIDE, PRESERVATIVE FREE 10 ML: 5 INJECTION INTRAVENOUS at 08:39

## 2020-01-01 RX ADMIN — PANTOPRAZOLE SODIUM 40 MG: 40 INJECTION, POWDER, FOR SOLUTION INTRAVENOUS at 09:43

## 2020-01-01 RX ADMIN — CALCIUM GLUCONATE 3 G: 98 INJECTION, SOLUTION INTRAVENOUS at 18:52

## 2020-01-01 RX ADMIN — HEPARIN SODIUM 5000 UNITS: 10000 INJECTION, SOLUTION INTRAVENOUS; SUBCUTANEOUS at 22:28

## 2020-01-01 RX ADMIN — PHENYLEPHRINE HYDROCHLORIDE 175 MCG/MIN: 10 INJECTION INTRAVENOUS at 23:35

## 2020-01-01 RX ADMIN — PHENYTOIN SODIUM 100 MG: 50 INJECTION INTRAMUSCULAR; INTRAVENOUS at 13:34

## 2020-01-01 RX ADMIN — SODIUM CHLORIDE: 9 INJECTION, SOLUTION INTRAVENOUS at 04:10

## 2020-01-01 RX ADMIN — PHENYLEPHRINE HYDROCHLORIDE 275 MCG/MIN: 10 INJECTION INTRAVENOUS at 20:00

## 2020-01-01 RX ADMIN — FOLIC ACID 1 MG: 1 TABLET ORAL at 08:39

## 2020-01-01 RX ADMIN — SODIUM CHLORIDE 1000 ML: 9 INJECTION, SOLUTION INTRAVENOUS at 01:40

## 2020-01-01 RX ADMIN — FLUMAZENIL: 0.1 INJECTION, SOLUTION INTRAVENOUS at 11:19

## 2020-01-01 RX ADMIN — INSULIN LISPRO 2 UNITS: 100 INJECTION, SOLUTION INTRAVENOUS; SUBCUTANEOUS at 13:56

## 2020-01-01 RX ADMIN — DEXTROSE MONOHYDRATE 20 MCG/MIN: 50 INJECTION, SOLUTION INTRAVENOUS at 11:15

## 2020-01-01 RX ADMIN — SODIUM CHLORIDE 1000 ML: 9 INJECTION, SOLUTION INTRAVENOUS at 14:51

## 2020-01-01 RX ADMIN — SODIUM CHLORIDE, PRESERVATIVE FREE 10 ML: 5 INJECTION INTRAVENOUS at 08:20

## 2020-01-01 RX ADMIN — Medication 10 MG/HR: at 17:24

## 2020-01-01 RX ADMIN — CALCIUM GLUCONATE 3 G: 98 INJECTION, SOLUTION INTRAVENOUS at 12:56

## 2020-01-01 RX ADMIN — Medication 6 MG/HR: at 14:12

## 2020-01-01 RX ADMIN — SODIUM CHLORIDE, PRESERVATIVE FREE 10 ML: 5 INJECTION INTRAVENOUS at 20:04

## 2020-01-01 RX ADMIN — SODIUM CHLORIDE, PRESERVATIVE FREE 10 ML: 5 INJECTION INTRAVENOUS at 09:04

## 2020-01-01 RX ADMIN — SODIUM BICARBONATE: 84 INJECTION, SOLUTION INTRAVENOUS at 10:38

## 2020-01-01 RX ADMIN — POTASSIUM PHOSPHATE, MONOBASIC AND POTASSIUM PHOSPHATE, DIBASIC 30 MMOL: 224; 236 INJECTION, SOLUTION, CONCENTRATE INTRAVENOUS at 09:04

## 2020-01-01 RX ADMIN — SODIUM CHLORIDE, PRESERVATIVE FREE 10 ML: 5 INJECTION INTRAVENOUS at 08:41

## 2020-01-01 RX ADMIN — SODIUM CHLORIDE, PRESERVATIVE FREE 10 ML: 5 INJECTION INTRAVENOUS at 09:20

## 2020-01-01 RX ADMIN — SODIUM CHLORIDE, PRESERVATIVE FREE 10 ML: 5 INJECTION INTRAVENOUS at 08:19

## 2020-01-01 RX ADMIN — ALBUMIN (HUMAN) 25 G: 0.25 INJECTION, SOLUTION INTRAVENOUS at 21:21

## 2020-01-01 RX ADMIN — SODIUM CHLORIDE: 9 INJECTION, SOLUTION INTRAVENOUS at 17:00

## 2020-01-01 RX ADMIN — SODIUM CHLORIDE, PRESERVATIVE FREE 10 ML: 5 INJECTION INTRAVENOUS at 16:52

## 2020-01-01 RX ADMIN — SODIUM CHLORIDE 1632 ML: 9 INJECTION, SOLUTION INTRAVENOUS at 13:31

## 2020-01-01 RX ADMIN — INSULIN LISPRO 4 UNITS: 100 INJECTION, SOLUTION INTRAVENOUS; SUBCUTANEOUS at 08:40

## 2020-01-01 RX ADMIN — CHLORHEXIDINE GLUCONATE 0.12% ORAL RINSE 15 ML: 1.2 LIQUID ORAL at 09:43

## 2020-01-01 RX ADMIN — Medication 10 MG/HR: at 03:28

## 2020-01-01 RX ADMIN — ANTICOAGULANT SODIUM CITRATE SOLUTION 4.3 ML: 4 SOLUTION INTRAVENOUS at 20:02

## 2020-01-01 RX ADMIN — Medication 10 ML: at 21:34

## 2020-01-01 RX ADMIN — PHENYTOIN SODIUM 100 MG: 50 INJECTION INTRAMUSCULAR; INTRAVENOUS at 23:06

## 2020-01-01 RX ADMIN — SODIUM CHLORIDE, PRESERVATIVE FREE 10 ML: 5 INJECTION INTRAVENOUS at 20:05

## 2020-01-01 RX ADMIN — CHLORHEXIDINE GLUCONATE 0.12% ORAL RINSE 15 ML: 1.2 LIQUID ORAL at 21:06

## 2020-01-01 RX ADMIN — MORPHINE SULFATE 4 MG: 4 INJECTION, SOLUTION INTRAMUSCULAR; INTRAVENOUS at 09:59

## 2020-01-01 RX ADMIN — SODIUM CHLORIDE: 9 INJECTION, SOLUTION INTRAVENOUS at 23:35

## 2020-01-01 RX ADMIN — CYANOCOBALAMIN TAB 1000 MCG 1000 MCG: 1000 TAB at 08:18

## 2020-01-01 RX ADMIN — PANTOPRAZOLE SODIUM 40 MG: 40 INJECTION, POWDER, FOR SOLUTION INTRAVENOUS at 08:39

## 2020-01-01 RX ADMIN — WATER 1 G: 1 INJECTION INTRAMUSCULAR; INTRAVENOUS; SUBCUTANEOUS at 20:09

## 2020-01-01 RX ADMIN — DEXTROSE MONOHYDRATE 12.5 G: 25 INJECTION, SOLUTION INTRAVENOUS at 13:26

## 2020-01-01 RX ADMIN — SODIUM BICARBONATE: 84 INJECTION, SOLUTION INTRAVENOUS at 19:36

## 2020-01-01 RX ADMIN — IPRATROPIUM BROMIDE AND ALBUTEROL SULFATE 1 AMPULE: .5; 3 SOLUTION RESPIRATORY (INHALATION) at 04:28

## 2020-01-01 RX ADMIN — PHENYTOIN SODIUM 100 MG: 50 INJECTION INTRAMUSCULAR; INTRAVENOUS at 23:57

## 2020-01-01 RX ADMIN — POTASSIUM CHLORIDE 20 MEQ: 400 INJECTION, SOLUTION INTRAVENOUS at 13:10

## 2020-01-01 RX ADMIN — PHENYTOIN SODIUM 100 MG: 50 INJECTION INTRAMUSCULAR; INTRAVENOUS at 23:31

## 2020-01-01 RX ADMIN — Medication 7 MG/HR: at 18:54

## 2020-01-01 RX ADMIN — INSULIN LISPRO 4 UNITS: 100 INJECTION, SOLUTION INTRAVENOUS; SUBCUTANEOUS at 00:20

## 2020-01-01 RX ADMIN — INSULIN LISPRO 2 UNITS: 100 INJECTION, SOLUTION INTRAVENOUS; SUBCUTANEOUS at 20:07

## 2020-01-01 RX ADMIN — INSULIN LISPRO 4 UNITS: 100 INJECTION, SOLUTION INTRAVENOUS; SUBCUTANEOUS at 04:10

## 2020-01-01 RX ADMIN — SODIUM CHLORIDE 500 ML: 9 INJECTION, SOLUTION INTRAVENOUS at 08:56

## 2020-01-01 RX ADMIN — CEFTRIAXONE 1 G: 1 INJECTION, POWDER, FOR SOLUTION INTRAMUSCULAR; INTRAVENOUS at 15:42

## 2020-01-01 RX ADMIN — PIPERACILLIN AND TAZOBACTAM 3.38 G: 3; .375 INJECTION, POWDER, FOR SOLUTION INTRAVENOUS at 23:56

## 2020-01-01 RX ADMIN — LABETALOL HYDROCHLORIDE 10 MG: 5 INJECTION, SOLUTION INTRAVENOUS at 18:30

## 2020-01-01 RX ADMIN — SODIUM CHLORIDE, PRESERVATIVE FREE 10 ML: 5 INJECTION INTRAVENOUS at 14:23

## 2020-01-01 RX ADMIN — SODIUM CHLORIDE 150 ML/HR: 9 INJECTION, SOLUTION INTRAVENOUS at 05:58

## 2020-01-01 RX ADMIN — ADENOSINE 6 MG: 3 INJECTION, SOLUTION INTRAVENOUS at 12:26

## 2020-01-01 RX ADMIN — POTASSIUM PHOSPHATE, MONOBASIC AND POTASSIUM PHOSPHATE, DIBASIC 30 MMOL: 224; 236 INJECTION, SOLUTION, CONCENTRATE INTRAVENOUS at 09:42

## 2020-01-01 RX ADMIN — HYDROCORTISONE SODIUM SUCCINATE 50 MG: 100 INJECTION, POWDER, FOR SOLUTION INTRAMUSCULAR; INTRAVENOUS at 14:57

## 2020-01-01 RX ADMIN — IPRATROPIUM BROMIDE AND ALBUTEROL SULFATE 1 AMPULE: .5; 3 SOLUTION RESPIRATORY (INHALATION) at 17:30

## 2020-01-01 RX ADMIN — PHENYTOIN SODIUM 100 MG: 50 INJECTION INTRAMUSCULAR; INTRAVENOUS at 11:33

## 2020-01-01 RX ADMIN — PHENYLEPHRINE HYDROCHLORIDE 125 MCG/MIN: 10 INJECTION INTRAVENOUS at 10:59

## 2020-01-01 RX ADMIN — FOLIC ACID 1 MG: 1 TABLET ORAL at 09:03

## 2020-01-01 RX ADMIN — Medication 10 ML: at 21:15

## 2020-01-01 RX ADMIN — THIAMINE HYDROCHLORIDE 100 MG: 100 INJECTION, SOLUTION INTRAMUSCULAR; INTRAVENOUS at 08:18

## 2020-01-01 RX ADMIN — PHENYTOIN SODIUM 100 MG: 50 INJECTION INTRAMUSCULAR; INTRAVENOUS at 00:32

## 2020-01-01 RX ADMIN — SODIUM CHLORIDE: 9 INJECTION, SOLUTION INTRAVENOUS at 18:55

## 2020-01-01 RX ADMIN — SODIUM CHLORIDE 1000 ML: 9 INJECTION, SOLUTION INTRAVENOUS at 10:20

## 2020-01-01 RX ADMIN — ALBUMIN (HUMAN) 25 G: 0.25 INJECTION, SOLUTION INTRAVENOUS at 03:44

## 2020-01-01 RX ADMIN — HYDRALAZINE HYDROCHLORIDE 5 MG: 20 INJECTION INTRAMUSCULAR; INTRAVENOUS at 17:41

## 2020-01-01 RX ADMIN — SODIUM BICARBONATE 150 MEQ: 84 INJECTION, SOLUTION INTRAVENOUS at 12:00

## 2020-01-01 RX ADMIN — Medication 10 ML: at 09:04

## 2020-01-01 RX ADMIN — PANTOPRAZOLE SODIUM 40 MG: 40 INJECTION, POWDER, FOR SOLUTION INTRAVENOUS at 16:46

## 2020-01-01 RX ADMIN — MAGNESIUM SULFATE HEPTAHYDRATE 2 G: 40 INJECTION, SOLUTION INTRAVENOUS at 09:14

## 2020-01-01 RX ADMIN — CHLORHEXIDINE GLUCONATE 0.12% ORAL RINSE 15 ML: 1.2 LIQUID ORAL at 08:20

## 2020-01-01 RX ADMIN — POTASSIUM PHOSPHATE, MONOBASIC AND POTASSIUM PHOSPHATE, DIBASIC 15 MMOL: 224; 236 INJECTION, SOLUTION, CONCENTRATE INTRAVENOUS at 18:53

## 2020-01-01 RX ADMIN — Medication 4 MG/HR: at 09:24

## 2020-01-01 RX ADMIN — THIAMINE HYDROCHLORIDE 100 MG: 100 INJECTION, SOLUTION INTRAMUSCULAR; INTRAVENOUS at 10:05

## 2020-01-01 RX ADMIN — FUROSEMIDE 40 MG: 10 INJECTION, SOLUTION INTRAMUSCULAR; INTRAVENOUS at 10:47

## 2020-01-01 RX ADMIN — SODIUM CHLORIDE, PRESERVATIVE FREE 10 ML: 5 INJECTION INTRAVENOUS at 21:06

## 2020-01-01 RX ADMIN — THIAMINE HYDROCHLORIDE 100 MG: 100 INJECTION, SOLUTION INTRAMUSCULAR; INTRAVENOUS at 08:39

## 2020-01-01 RX ADMIN — INSULIN LISPRO 4 UNITS: 100 INJECTION, SOLUTION INTRAVENOUS; SUBCUTANEOUS at 17:53

## 2020-01-01 RX ADMIN — Medication 10 MG/HR: at 06:50

## 2020-01-01 RX ADMIN — CHLORHEXIDINE GLUCONATE 0.12% ORAL RINSE 15 ML: 1.2 LIQUID ORAL at 20:04

## 2020-01-01 RX ADMIN — INSULIN LISPRO 4 UNITS: 100 INJECTION, SOLUTION INTRAVENOUS; SUBCUTANEOUS at 23:22

## 2020-01-01 RX ADMIN — POTASSIUM CHLORIDE 20 MEQ: 400 INJECTION, SOLUTION INTRAVENOUS at 13:29

## 2020-01-01 RX ADMIN — Medication 10 ML: at 21:00

## 2020-01-01 RX ADMIN — SODIUM CHLORIDE: 9 INJECTION, SOLUTION INTRAVENOUS at 22:54

## 2020-01-01 RX ADMIN — SODIUM CHLORIDE: 9 INJECTION, SOLUTION INTRAVENOUS at 21:21

## 2020-01-01 RX ADMIN — ACETAMINOPHEN 650 MG: 650 SUPPOSITORY RECTAL at 01:56

## 2020-01-01 RX ADMIN — IPRATROPIUM BROMIDE AND ALBUTEROL SULFATE 1 AMPULE: .5; 3 SOLUTION RESPIRATORY (INHALATION) at 00:00

## 2020-01-01 RX ADMIN — FOLIC ACID 1 MG: 1 TABLET ORAL at 09:43

## 2020-01-01 RX ADMIN — IPRATROPIUM BROMIDE AND ALBUTEROL SULFATE 1 AMPULE: .5; 3 SOLUTION RESPIRATORY (INHALATION) at 07:51

## 2020-01-01 RX ADMIN — SODIUM CHLORIDE: 9 INJECTION, SOLUTION INTRAVENOUS at 05:52

## 2020-01-01 RX ADMIN — PHENYTOIN SODIUM 100 MG: 50 INJECTION INTRAMUSCULAR; INTRAVENOUS at 00:21

## 2020-01-01 RX ADMIN — CHLORHEXIDINE GLUCONATE 0.12% ORAL RINSE 15 ML: 1.2 LIQUID ORAL at 08:39

## 2020-01-01 RX ADMIN — PHENYLEPHRINE HYDROCHLORIDE 150 MCG/MIN: 10 INJECTION INTRAVENOUS at 22:53

## 2020-01-01 RX ADMIN — SODIUM CHLORIDE, PRESERVATIVE FREE 10 ML: 5 INJECTION INTRAVENOUS at 20:10

## 2020-01-01 RX ADMIN — PIPERACILLIN AND TAZOBACTAM 3.38 G: 3; .375 INJECTION, POWDER, FOR SOLUTION INTRAVENOUS at 16:53

## 2020-01-01 RX ADMIN — PHENYTOIN SODIUM 100 MG: 50 INJECTION INTRAMUSCULAR; INTRAVENOUS at 16:47

## 2020-01-01 RX ADMIN — INSULIN LISPRO 6 UNITS: 100 INJECTION, SOLUTION INTRAVENOUS; SUBCUTANEOUS at 06:50

## 2020-01-01 RX ADMIN — CYANOCOBALAMIN TAB 1000 MCG 1000 MCG: 1000 TAB at 08:41

## 2020-01-01 RX ADMIN — THIAMINE HYDROCHLORIDE 100 MG: 100 INJECTION, SOLUTION INTRAMUSCULAR; INTRAVENOUS at 09:03

## 2020-01-01 RX ADMIN — VASOPRESSIN 0.02 UNITS/MIN: 20 INJECTION INTRAVENOUS at 12:05

## 2020-01-01 RX ADMIN — PHENYLEPHRINE HYDROCHLORIDE 300 MCG/MIN: 10 INJECTION INTRAVENOUS at 09:17

## 2020-01-01 RX ADMIN — WATER 1 G: 1 INJECTION INTRAMUSCULAR; INTRAVENOUS; SUBCUTANEOUS at 18:50

## 2020-01-01 RX ADMIN — INSULIN LISPRO 4 UNITS: 100 INJECTION, SOLUTION INTRAVENOUS; SUBCUTANEOUS at 11:40

## 2020-01-01 RX ADMIN — PHENYLEPHRINE HYDROCHLORIDE 275 MCG/MIN: 10 INJECTION INTRAVENOUS at 12:42

## 2020-01-01 RX ADMIN — WATER 1 G: 1 INJECTION INTRAMUSCULAR; INTRAVENOUS; SUBCUTANEOUS at 13:29

## 2020-01-01 RX ADMIN — POTASSIUM CHLORIDE 20 MEQ: 400 INJECTION, SOLUTION INTRAVENOUS at 09:42

## 2020-01-01 RX ADMIN — INSULIN LISPRO 4 UNITS: 100 INJECTION, SOLUTION INTRAVENOUS; SUBCUTANEOUS at 06:22

## 2020-01-01 RX ADMIN — CALCIUM GLUCONATE 2 G: 98 INJECTION, SOLUTION INTRAVENOUS at 18:53

## 2020-01-01 RX ADMIN — IPRATROPIUM BROMIDE AND ALBUTEROL SULFATE 1 AMPULE: .5; 3 SOLUTION RESPIRATORY (INHALATION) at 20:04

## 2020-01-01 RX ADMIN — CYANOCOBALAMIN TAB 1000 MCG 1000 MCG: 1000 TAB at 09:03

## 2020-01-01 RX ADMIN — MAGNESIUM SULFATE HEPTAHYDRATE 1 G: 1 INJECTION, SOLUTION INTRAVENOUS at 10:47

## 2020-01-01 RX ADMIN — SODIUM CHLORIDE: 9 INJECTION, SOLUTION INTRAVENOUS at 02:16

## 2020-01-01 RX ADMIN — Medication 9 MG/HR: at 03:59

## 2020-01-01 RX ADMIN — SODIUM CHLORIDE, PRESERVATIVE FREE 10 ML: 5 INJECTION INTRAVENOUS at 20:08

## 2020-01-01 RX ADMIN — LORAZEPAM 3 MG: 2 INJECTION INTRAMUSCULAR; INTRAVENOUS at 21:18

## 2020-01-01 RX ADMIN — THIAMINE HYDROCHLORIDE 100 MG: 100 INJECTION, SOLUTION INTRAMUSCULAR; INTRAVENOUS at 16:46

## 2020-01-01 RX ADMIN — LORAZEPAM 2 MG: 2 INJECTION INTRAMUSCULAR; INTRAVENOUS at 00:52

## 2020-01-01 RX ADMIN — ADENOSINE 6 MG: 3 INJECTION, SOLUTION INTRAVENOUS at 12:30

## 2020-01-01 RX ADMIN — CYANOCOBALAMIN TAB 1000 MCG 1000 MCG: 1000 TAB at 09:46

## 2020-01-01 RX ADMIN — ALBUMIN (HUMAN) 25 G: 0.25 INJECTION, SOLUTION INTRAVENOUS at 11:23

## 2020-01-01 RX ADMIN — FOLIC ACID 1 MG: 1 TABLET ORAL at 08:18

## 2020-01-01 RX ADMIN — PHENYTOIN SODIUM 100 MG: 50 INJECTION INTRAMUSCULAR; INTRAVENOUS at 00:22

## 2020-01-01 RX ADMIN — SODIUM CHLORIDE: 9 INJECTION, SOLUTION INTRAVENOUS at 11:30

## 2020-01-01 RX ADMIN — Medication 150 MEQ: at 12:00

## 2020-01-01 RX ADMIN — PHENYLEPHRINE HYDROCHLORIDE 100 MCG/MIN: 10 INJECTION INTRAVENOUS at 12:36

## 2020-01-01 RX ADMIN — PHENYLEPHRINE HYDROCHLORIDE 300 MCG/MIN: 10 INJECTION INTRAVENOUS at 06:07

## 2020-01-01 RX ADMIN — FOLIC ACID 1 MG: 1 TABLET ORAL at 16:49

## 2020-01-01 RX ADMIN — PHENYLEPHRINE HYDROCHLORIDE 300 MCG/MIN: 10 INJECTION INTRAVENOUS at 03:05

## 2020-01-01 RX ADMIN — CALCIUM GLUCONATE 1 G: 98 INJECTION, SOLUTION INTRAVENOUS at 11:17

## 2020-01-01 RX ADMIN — LORAZEPAM 2 MG: 2 INJECTION INTRAMUSCULAR; INTRAVENOUS at 15:24

## 2020-01-01 RX ADMIN — SODIUM BICARBONATE: 84 INJECTION, SOLUTION INTRAVENOUS at 12:37

## 2020-01-01 RX ADMIN — WATER 1 G: 1 INJECTION INTRAMUSCULAR; INTRAVENOUS; SUBCUTANEOUS at 20:08

## 2020-01-01 RX ADMIN — SODIUM CHLORIDE: 9 INJECTION, SOLUTION INTRAVENOUS at 17:10

## 2020-01-01 RX ADMIN — CYANOCOBALAMIN TAB 1000 MCG 1000 MCG: 1000 TAB at 16:50

## 2020-01-01 RX ADMIN — PHENYTOIN SODIUM 100 MG: 50 INJECTION INTRAMUSCULAR; INTRAVENOUS at 13:18

## 2020-01-01 RX ADMIN — MORPHINE SULFATE 4 MG: 4 INJECTION, SOLUTION INTRAMUSCULAR; INTRAVENOUS at 20:43

## 2020-01-01 RX ADMIN — PHENYLEPHRINE HYDROCHLORIDE 150 MCG/MIN: 10 INJECTION INTRAVENOUS at 04:24

## 2020-01-01 RX ADMIN — PANTOPRAZOLE SODIUM 40 MG: 40 INJECTION, POWDER, FOR SOLUTION INTRAVENOUS at 08:19

## 2020-01-01 RX ADMIN — ALBUMIN (HUMAN) 25 G: 0.25 INJECTION, SOLUTION INTRAVENOUS at 16:30

## 2020-01-01 RX ADMIN — PHENYLEPHRINE HYDROCHLORIDE 275 MCG/MIN: 10 INJECTION INTRAVENOUS at 16:12

## 2020-01-01 RX ADMIN — EPOETIN ALFA-EPBX 3000 UNITS: 3000 INJECTION, SOLUTION INTRAVENOUS; SUBCUTANEOUS at 20:04

## 2020-01-01 RX ADMIN — CHLORHEXIDINE GLUCONATE 0.12% ORAL RINSE 15 ML: 1.2 LIQUID ORAL at 20:08

## 2020-01-01 RX ADMIN — DEXTROSE MONOHYDRATE 12.5 G: 25 INJECTION, SOLUTION INTRAVENOUS at 12:38

## 2020-01-01 RX ADMIN — MORPHINE SULFATE 1 MG/HR: 10 INJECTION INTRAVENOUS at 09:59

## 2020-01-01 RX ADMIN — SODIUM CHLORIDE: 9 INJECTION, SOLUTION INTRAVENOUS at 09:14

## 2020-01-01 RX ADMIN — SODIUM CHLORIDE: 9 INJECTION, SOLUTION INTRAVENOUS at 04:00

## 2020-01-01 RX ADMIN — CHLORHEXIDINE GLUCONATE 0.12% ORAL RINSE 15 ML: 1.2 LIQUID ORAL at 09:03

## 2020-01-01 RX ADMIN — SODIUM CHLORIDE, PRESERVATIVE FREE 10 ML: 5 INJECTION INTRAVENOUS at 09:45

## 2020-01-01 RX ADMIN — Medication 1 MG/HR: at 17:19

## 2020-01-01 RX ADMIN — PANTOPRAZOLE SODIUM 40 MG: 40 INJECTION, POWDER, FOR SOLUTION INTRAVENOUS at 09:19

## 2020-01-01 ASSESSMENT — PULMONARY FUNCTION TESTS
PIF_VALUE: 39
PIF_VALUE: 46
PIF_VALUE: 41
PIF_VALUE: 34
PIF_VALUE: 39
PIF_VALUE: 43
PIF_VALUE: 47
PIF_VALUE: 46
PIF_VALUE: 42
PIF_VALUE: 49
PIF_VALUE: 41
PIF_VALUE: 39
PIF_VALUE: 44
PIF_VALUE: 60
PIF_VALUE: 31
PIF_VALUE: 41
PIF_VALUE: 36
PIF_VALUE: 42
PIF_VALUE: 33
PIF_VALUE: 37
PIF_VALUE: 46
PIF_VALUE: 37
PIF_VALUE: 48
PIF_VALUE: 40
PIF_VALUE: 33
PIF_VALUE: 44
PIF_VALUE: 42
PIF_VALUE: 49
PIF_VALUE: 41
PIF_VALUE: 43
PIF_VALUE: 36
PIF_VALUE: 41
PIF_VALUE: 40
PIF_VALUE: 44
PIF_VALUE: 37
PIF_VALUE: 40
PIF_VALUE: 41
PIF_VALUE: 38
PIF_VALUE: 39
PIF_VALUE: 32
PIF_VALUE: 55
PIF_VALUE: 37
PIF_VALUE: 40
PIF_VALUE: 30
PIF_VALUE: 18
PIF_VALUE: 42
PIF_VALUE: 33
PIF_VALUE: 48
PIF_VALUE: 45
PIF_VALUE: 39
PIF_VALUE: 41
PIF_VALUE: 38
PIF_VALUE: 0
PIF_VALUE: 43
PIF_VALUE: 41
PIF_VALUE: 51
PIF_VALUE: 47
PIF_VALUE: 40
PIF_VALUE: 33
PIF_VALUE: 34
PIF_VALUE: 35
PIF_VALUE: 30
PIF_VALUE: 50
PIF_VALUE: 41
PIF_VALUE: 33
PIF_VALUE: 39
PIF_VALUE: 24
PIF_VALUE: 25
PIF_VALUE: 35
PIF_VALUE: 42
PIF_VALUE: 48
PIF_VALUE: 47
PIF_VALUE: 31
PIF_VALUE: 47
PIF_VALUE: 36
PIF_VALUE: 60
PIF_VALUE: 52
PIF_VALUE: 35
PIF_VALUE: 24
PIF_VALUE: 50
PIF_VALUE: 59
PIF_VALUE: 46
PIF_VALUE: 46
PIF_VALUE: 42
PIF_VALUE: 29
PIF_VALUE: 35
PIF_VALUE: 40
PIF_VALUE: 40
PIF_VALUE: 34
PIF_VALUE: 38
PIF_VALUE: 40
PIF_VALUE: 46
PIF_VALUE: 33
PIF_VALUE: 44
PIF_VALUE: 42
PIF_VALUE: 45
PIF_VALUE: 38
PIF_VALUE: 42
PIF_VALUE: 43
PIF_VALUE: 38
PIF_VALUE: 33
PIF_VALUE: 40
PIF_VALUE: 33
PIF_VALUE: 37
PIF_VALUE: 49
PIF_VALUE: 33
PIF_VALUE: 41
PIF_VALUE: 54
PIF_VALUE: 60
PIF_VALUE: 60
PIF_VALUE: 47
PIF_VALUE: 36
PIF_VALUE: 42
PIF_VALUE: 50
PIF_VALUE: 42
PIF_VALUE: 42
PIF_VALUE: 43
PIF_VALUE: 52
PIF_VALUE: 41
PIF_VALUE: 35
PIF_VALUE: 32
PIF_VALUE: 36
PIF_VALUE: 31
PIF_VALUE: 50
PIF_VALUE: 32
PIF_VALUE: 37
PIF_VALUE: 41
PIF_VALUE: 47
PIF_VALUE: 42
PIF_VALUE: 46

## 2020-01-01 ASSESSMENT — ENCOUNTER SYMPTOMS
DIARRHEA: 0
COUGH: 0
ABDOMINAL PAIN: 0
SORE THROAT: 0
SORE THROAT: 0
EYE REDNESS: 0
SINUS PRESSURE: 0
COUGH: 0
DIARRHEA: 0
WHEEZING: 0
VOMITING: 0
ABDOMINAL PAIN: 0
SHORTNESS OF BREATH: 0
EYE DISCHARGE: 0
BACK PAIN: 1
ABDOMINAL DISTENTION: 0
WHEEZING: 0
NAUSEA: 0
SHORTNESS OF BREATH: 0
NAUSEA: 0
EYE PAIN: 0
VOMITING: 0

## 2020-01-01 ASSESSMENT — PAIN SCALES - GENERAL
PAINLEVEL_OUTOF10: 0
PAINLEVEL_OUTOF10: 9
PAINLEVEL_OUTOF10: 0
PAINLEVEL_OUTOF10: 1
PAINLEVEL_OUTOF10: 0
PAINLEVEL_OUTOF10: 2
PAINLEVEL_OUTOF10: 0
PAINLEVEL_OUTOF10: 3
PAINLEVEL_OUTOF10: 0
PAINLEVEL_OUTOF10: 2
PAINLEVEL_OUTOF10: 0
PAINLEVEL_OUTOF10: 9
PAINLEVEL_OUTOF10: 0

## 2020-01-01 ASSESSMENT — PAIN DESCRIPTION - FREQUENCY: FREQUENCY: CONTINUOUS

## 2020-01-01 ASSESSMENT — PAIN DESCRIPTION - PAIN TYPE: TYPE: ACUTE PAIN

## 2020-01-01 ASSESSMENT — PAIN DESCRIPTION - ORIENTATION: ORIENTATION: RIGHT

## 2020-01-01 ASSESSMENT — PAIN DESCRIPTION - LOCATION: LOCATION: FOOT

## 2020-04-28 NOTE — ED PROVIDER NOTES
and placed a sterile dry dressing on her foot for which this was placed. Patient requires continued workup and management of their symptoms and will be transferred for further evaluation and treatment.    --------------------------------------------- PAST HISTORY ---------------------------------------------  Past Medical History:  has no past medical history on file. Past Surgical History:  has no past surgical history on file. Social History:  reports that she has been smoking cigarettes. She has been smoking about 2.00 packs per day. She has never used smokeless tobacco. She reports current alcohol use. Family History: family history is not on file. The patients home medications have been reviewed. Allergies: Patient has no known allergies. -------------------------------------------------- RESULTS -------------------------------------------------    Lab  No results found for this visit on 04/28/20. Radiology  No orders to display             ------------------------- NURSING NOTES AND VITALS REVIEWED ---------------------------  Date / Time Roomed:  4/28/2020  6:47 PM  ED Bed Assignment:  21/21    The nursing notes within the ED encounter and vital signs as below have been reviewed. Patient Vitals for the past 24 hrs:   BP Temp Temp src Pulse Resp SpO2 Weight   04/28/20 1944 (!) 193/93 98 °F (36.7 °C) Oral 114 18 98 % 115 lb (52.2 kg)   04/28/20 1849 (!) 191/105 98 °F (36.7 °C) Oral 117 18 99 % 115 lb (52.2 kg)             I have spoken with the patient/family members and discussed todays results, in addition to providing specific details for the plan of care and counseling regarding the diagnosis and prognosis. Their questions are answered at this time and they are agreeable with the plan. I have discussed the risks and benefits of transfer and they wish to proceed with the transfer.             This patient's ED course included: a personal history and physicial examination, re-evaluation prior to disposition, multiple bedside re-evaluations, IV medications and complex medical decision making and emergency management        Please note that the withdrawal or failure to initiate urgent interventions for this patient would likely result in a life threatening deterioration or permanent disability. Accordingly this patient received 30 minutes of critical care time, excluding separately billable procedures. Clinical Impression  1. Full thickness burn of right foot, initial encounter          Disposition  Patient's disposition: Transfer to [place] [facility] [place]  Patient's condition: Serious    NOTE:  This report was transcribed using voice recognition software. Efforts were made to ensure accuracy; however, inadvertent computerized transcription errors may be present. Denton De Paz DO  Resident  04/28/20 0527    ATTENDING PROVIDER ATTESTATION:     Norah Kristen presented to the emergency department for evaluation of Foot Burn (spilled boiling water on it 5 days ago, was putting burn ointment on it)   and was initially evaluated by the Medical Resident. See Original ED Note for H&P and ED course above. I have reviewed and discussed the case, including pertinent history (medical, surgical, family and social) and exam findings with the Medical Resident assigned to Norah Peterson. I have personally performed and/or participated in the history, exam, medical decision making, and procedures and agree with all pertinent clinical information. I have reviewed my findings and recommendations with the assigned Medical Resident, Norah Peterson and members of family present at the time of disposition. My findings/plan: The encounter diagnosis was Full thickness burn of right foot, initial encounter. There are no discharge medications for this patient. Total critical care was 30 minutes.     Lefty Shields [address] [place] [address]  05/09/20 0350

## 2020-04-29 NOTE — PROGRESS NOTES
Em Hays called from [facility] via [facility] @ [phone]. Dr. Roger Pandey spoke to Em Hays @ this time. Received call from  [facility] @ 2001 to advise accepting physician is Dr. Michelle Cortez. Pt will go through ER Triage. Nurse to call report to: [phone]. Caller advised already secured transport needs from physician.

## 2020-08-09 PROBLEM — A41.9 SEPSIS (HCC): Status: ACTIVE | Noted: 2020-01-01

## 2020-08-09 NOTE — ED PROVIDER NOTES
Department of Emergency Medicine   ED  Provider Note  Admit Date/RoomTime: 8/9/2020 12:10 PM  ED Room: 09/09          History of Present Illness:  8/9/20, Time: 12:47 PM EDT         Cathy Bhat is a 63 y.o. female presenting to the ED for falls and altered mental status, beginning unknown. The complaint has been Unknown, severe in severity, and worsened by unknown. Patient presents from home via EMS for altered mental status and multiple falls over the past week. No further history obtained at this time secondary to patient mental status and no one else at bedside. EMS does state patient has a past history of stroke affecting right side. I did attempt to call patient's home number, as well as listed emergency contact to attempt to gather further history but was unable to reach anyone. Review of Systems:   Pertinent positives and negatives are stated within HPI, all other systems reviewed and are negative. Review of Systems   Constitutional: Negative for chills and fever. HENT: Negative for ear pain, sinus pressure and sore throat. Eyes: Negative for pain, discharge and redness. Respiratory: Negative for cough, shortness of breath and wheezing. Cardiovascular: Negative for chest pain. Gastrointestinal: Negative for abdominal distention, abdominal pain, diarrhea, nausea and vomiting. Genitourinary: Negative for dysuria and frequency. Musculoskeletal: Positive for back pain. Negative for arthralgias, neck pain and neck stiffness. Skin: Negative for rash and wound. Neurological: Negative for weakness and headaches. Hematological: Negative for adenopathy. All other systems reviewed and are negative.           --------------------------------------------- PAST HISTORY ---------------------------------------------  Past Medical History:  has no past medical history on file. Past Surgical History:  has no past surgical history on file.     Social History:  reports that she has been smoking cigarettes. She has been smoking about 2.00 packs per day. She has never used smokeless tobacco. She reports current alcohol use. Family History: family history is not on file. The patients home medications have been reviewed. Allergies: Patient has no known allergies. ---------------------------------------------------PHYSICAL EXAM--------------------------------------      Physical Exam  Vitals signs and nursing note reviewed. Constitutional:       Appearance: She is well-developed. HENT:      Head: Normocephalic and atraumatic. Eyes:      Extraocular Movements: Extraocular movements intact. Conjunctiva/sclera: Conjunctivae normal.      Pupils: Pupils are equal, round, and reactive to light. Neck:      Musculoskeletal: Normal range of motion and neck supple. Cardiovascular:      Rate and Rhythm: Normal rate and regular rhythm. Heart sounds: Normal heart sounds. No murmur. Pulmonary:      Effort: Pulmonary effort is normal. No respiratory distress. Breath sounds: Rhonchi (R) present. No wheezing or rales. Abdominal:      General: Bowel sounds are normal.      Palpations: Abdomen is soft. Tenderness: There is abdominal tenderness (diffuse). There is no guarding or rebound. Musculoskeletal:         General: No deformity. Comments: Region to right scapula, ecchymosis to left lumbar spine region   Skin:     General: Skin is warm and dry. Findings: Bruising present. Neurological:      Mental Status: She is alert. Comments: Oriented to person only. 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 Follows simple commands            -------------------------------------------------- RESULTS -------------------------------------------------  I have personally reviewed all laboratory and imaging results for this patient. Results are listed below.      LABS:  Results for orders placed or performed during the hospital encounter of 08/09/20 POCT glucose   Result Value Ref Range    Glucose 69 mg/dL    QC OK? y    POCT Glucose   Result Value Ref Range    Meter Glucose 69 (L) 74 - 99 mg/dL       RADIOLOGY:  Interpreted by Radiologist unless otherwise specified  CT HEAD WO CONTRAST    (Results Pending)   XR CHEST PORTABLE    (Results Pending)   CT CERVICAL SPINE WO CONTRAST    (Results Pending)   CT CHEST WO CONTRAST    (Results Pending)   CT ABDOMEN PELVIS WO CONTRAST Additional Contrast? None    (Results Pending)         EKG: This EKG is signed by emergency department physician. Rate: 97  Rhythm: Sinus  Interpretation: low voltage, no acute changes  Comparison: stable as compared to patient's most recent EKG 10/21/16        ------------------------- NURSING NOTES AND VITALS REVIEWED ---------------------------   The nursing notes within the ED encounter and vital signs as below have been reviewed by myself. BP (!) 75/30   Pulse 104   Temp 97.6 °F (36.4 °C) (Oral)   Resp 20   Oxygen Saturation Interpretation: Normal    The patients available past medical records and past encounters were reviewed. ------------------------------ ED COURSE/MEDICAL DECISION MAKING----------------------  Medications   dextrose 50 % IV solution (has no administration in time range)   0.9 % sodium chloride IV bolus 30 mL/kg (has no administration in time range)   sodium chloride flush 0.9 % injection 10 mL (has no administration in time range)   sodium chloride flush 0.9 % injection 10 mL (has no administration in time range)         Medical Decision Makin-year-old female presents evaluation of worsening mental status from her nursing home. Patient is most likely septic from a UTI. Patient given fluid resuscitation with improvement of blood pressure started on empiric antibiotics.   Due to patient's initial low blood pressure central line was considered however patient was found to be significantly thrombocytopenic so platelets were ordered however platelets were delayed secondary to patient's past antibiotic history. Patient also found to I      Re-Evaluations:             ED Course as of Aug 12 1917   Jenise Escobar Aug 09, 2020   1439 Per , all blood work under green tube is from wrong patient and not accurate. They state cbc is correct. [MF]   1520 Patient remains alert but confused patient's second liter of fluid is still running    [MF]   1537 Per nursing placement of [place], urine is thick and cloudy and likely infected clinically. Antibiotics ordered empirically    [MF]   1545 Creatinine(!): 3.7 [MF]   1545 Sodium(!): 131 [MF]   1624 Talked to patient's daughter. Seizures, HTN, stroke at 30. Alcohol abuse, cirrhosis, narcotic addiction. Does have a history of low platelets  Sees a  In [facility]. No known history of kidney disease. [MF]   2014 Discussed case with Dr. Joey Rome, she will admit patient. []      ED Course User Index  [MF] Nelida Cabrera,          This patient's ED course included: a personal history and physicial examination, multiple bedside re-evaluations, IV medications, cardiac monitoring, continuous pulse oximetry and complex medical decision making and emergency management    This patient has arrived hypotensive but after fluid resuscitation became hemodynamically stable during their ED course. Please note that the withdrawal or failure to initiate urgent interventions for this patient would likely result in a life threatening deterioration or permanent disability. Systems at risk for deterioration include: Cardiovascular    Accordingly this patient received 30 minutes of critical care time, excluding separately billable procedures. Counseling: The emergency provider has spoken with the patient and family member patient and daughter and discussed todays results, in addition to providing specific details for the plan of care and counseling regarding the diagnosis and prognosis. Questions are answered at this time and they are agreeable with the plan.       --------------------------------- IMPRESSION AND DISPOSITION ---------------------------------    IMPRESSION  1. [facility]    2. Urinary tract infection without hematuria, site unspecified    3. GILDA (acute kidney injury) ([facility]    4. Thrombocytopenia ([facility]    5. Altered mental status, unspecified altered mental status type    6. Acute respiratory failure with hypoxia (HCC)        DISPOSITION  Disposition: Admit to intermediate with telemetry  Patient condition is serious        NOTE: This report was transcribed using voice recognition software.  Effort was made to ensure accuracy; however, inadvertent computerized transcription errors may be present       Siria Weaver DO  08/12/20 3313

## 2020-08-09 NOTE — ED NOTES
Bed: 09  Expected date:   Expected time:   Means of arrival:   Comments:  LF - confusion, fatigue     Clarance Stalling.  Kaylee Hodge RN  08/09/20 9781 Opioid Counseling: I discussed with the patient the potential side effects of opioids including but not limited to addiction, altered mental status, and depression. I stressed avoiding alcohol, benzodiazepines, muscle relaxants and sleep aids unless specifically okayed by a physician. The patient verbalized understanding of the proper use and possible adverse effects of opioids. All of the patient's questions and concerns were addressed. They were instructed to flush the remaining pills down the toilet if they did not need them for pain.

## 2020-08-09 NOTE — H&P
[facility] History and Physical      CHIEF COMPLAINT: Altered mental status    History of Present Illness: Lux Best is a 53-year-old white female with a recent history of altered mental status and multiple falls at home. Her neighbor called EMS today due to her altered mental status. The patient generally lives alone otherwise. She does have a history of chronic alcoholism. Of note her daughter spoke with her on the phone approximately 1 week ago and at that time the patient was mildly altered. Upon presentation to the emergency department, the patient was unable to answer questions. My history was obtained through the emergency department physician and review of the patient's chart. There has been no recent diarrhea. Per the nursing staff the patient had pus come from the bladder catheter when it was placed. She also has a history of hypertension, prior ischemic stroke,  and seizure disorder. Informant(s) for H&P:  Emergency room physician, EMS notes, and patient chart. REVIEW OF SYSTEMS:  A comprehensive review of systems was unable to be performed due to patient's altered mental status. PMH:  Past Medical History:   Diagnosis Date    Hypertension     Seizures ([facility] [address]        Surgical History:  History reviewed. No pertinent surgical history. Medications Prior to Admission:    Prior to Admission medications    Medication Sig Start Date End Date Taking?  Authorizing Provider   hydrOXYzine (VISTARIL) 50 MG capsule Take 50 mg by mouth 3 times daily as needed for Itching   Yes Historical Provider, MD   lisinopril (PRINIVIL;ZESTRIL) 5 MG tablet Take 5 mg by mouth daily   Yes Historical Provider, MD   DULoxetine (CYMBALTA) 30 MG extended release capsule Take 30 mg by mouth daily   Yes Historical Provider, MD   phenytoin (DILANTIN) 100 MG ER capsule Take by mouth 2 times daily   Yes Historical Provider, MD   doxepin (SINEQUAN) 50 MG capsule Take 50 mg by mouth nightly   Yes Historical Provider, MD       Allergies:    Patient has no known allergies. Social History:    reports that she has been smoking cigarettes. She has been smoking about 2.00 packs per day. She has never used smokeless tobacco. She reports current alcohol use. Family History:   family history is not on file. unable to obtain      PHYSICAL EXAM:  Vitals:  BP 92/78   Pulse 100   Temp 97.6 °F (36.4 °C) (Oral)   Resp 15   Ht 5' 2\" (1.575 m)   Wt 120 lb (54.4 kg)   SpO2 99%   BMI 21.95 kg/m²     General Appearance: patient is restless, cannot answer questions, speaks incoherently  Skin: warm and dry, numerous bruises, but no petechiae  Head: normocephalic and atraumatic  Eyes: pupils equal, round, and reactive to light, extraocular eye movements intact, conjunctivae normal  Neck: neck supple and non tender without mass   Mouth:  Dry mucous membranes; patient is edentulous. Pulmonary/Chest: clear to auscultation bilaterally- no wheezes, rales or rhonchi, normal air movement, no respiratory distress  Cardiovascular: normal rate, normal S1 and S2 and no carotid bruits  Abdomen: soft, non-tender, distended, normal bowel sounds, no masses; the liver is mildly enlarged. Extremities: no cyanosis, no clubbing and no edema  Neurologic: no cranial nerve deficit and speech normal        LABS:  Recent Labs     08/09/20  1231 08/09/20  1323 08/09/20  1510   NA  --  SEE BELOW* 131*   K  --  SEE BELOW* 4.4   CL  --  SEE BELOW* 102   CO2  --  SEE BELOW* 9*   BUN  --  SEE BELOW* 97*   CREATININE  --  SEE BELOW* 3.7*   GLUCOSE 69 SEE BELOW* 193*   CALCIUM  --  SEE BELOW* 8.5*       Recent Labs     08/09/20  1323   WBC 17.3*   RBC 2.93*   HGB 9.8*   HCT 29.1*   MCV 99.3   MCH 33.4   MCHC 33.7   RDW 15.2*   PLT 12*   MPV NOT CALC     Results for Janelle Aguila (MRN [id number]) as of 8/9/2020 17:55   Ref.  Range 8/9/2020 12:31 8/9/2020 12:37 8/9/2020 13:23 8/9/2020 15:10 8/9/2020 16:50   Amphetamine Screen, Urine Latest Ref Range: Negative <1000 ng/mL   NOT DETECTED      Barbiturate Screen, Ur Latest Ref Range: Negative < 200 ng/mL   NOT DETECTED      Benzodiazepine Screen, Urine Latest Ref Range: Negative < 200 ng/mL   NOT DETECTED      Cannabinoid Scrn, Ur Latest Ref Range: Negative < 50ng/mL   NOT DETECTED      Methadone Screen, Urine Latest Ref Range: Negative <300 ng/mL   NOT DETECTED      Opiate Scrn, Ur Latest Ref Range: Negative < 300ng/mL   NOT DETECTED      PCP Screen, Urine Latest Ref Range: Negative < 25 ng/mL   NOT DETECTED      Cocaine Metabolite Screen, Urine Latest Ref Range: Negative < 300 ng/mL   NOT DETECTED      FENTANYL SCREEN, URINE Latest Ref Range: Negative <1 ng/mL   NOT DETECTED      Oxycodone Urine Latest Ref Range: Negative <100 ng/mL   NOT DETECTED        No results for input(s): POCGLU in the last 72 hours. Radiology:   CT HEAD WO CONTRAST   Final Result         1. No acute intracranial abnormality. 2. Chronic infarction with encephalomalacia in the left frontal lobe,   insula and the lateral aspect of the basal ganglia. CT CERVICAL SPINE WO CONTRAST   Final Result   1. No fracture or joint dislocation is seen in the cervical spine. 2. Minimal anterior wedging of the T1 vertebral body indicates an   age-indeterminate fracture. If indicated, MRI may be obtained for   further evaluation. CT CHEST WO CONTRAST   Final Result    1. Minimal age-indeterminate compression fracture deformities of the   T1 and T9 vertebral bodies. If indicated, MRI may be obtained for   further evaluation. 2. No other traumatic abnormality is seen. 3. Mild cardiomegaly with evidence of mild pulmonary arterial   hypertension. CT ABDOMEN PELVIS WO CONTRAST Additional Contrast? None   Final Result   Findings suggestive of cirrhosis with splenomegaly. No acute   abnormality in the abdomen or pelvis.                      XR CHEST PORTABLE   Final Result   Streaky opacities at the lung bases are nonspecific and may represent   atelectasis, scarring or an acute infectious/inflammatory process. EKG: Narrative & Impression     Normal sinus rhythm  Low voltage QRS  Borderline ECG  No previous ECGs available         ASSESSMENT:      Active Problems:    Sepsis ([address]  Resolved Problems:    * No resolved hospital problems. *      PLAN:    1. Sepsis, POA, secondary to complicated UTI, hypotension, tachycardia, leukocytosis   -abnormal urinalysis with large blood, packed WBC and many bacteria (pus-per nursing staff)   -plan for hydration, abx;Rocephin 1g IV Q24 hours   -urine culture pending   -blood cultures pending    2. Multiple age indeterminent compression fractures of the spine, multiple recent falls   -monitor patient   -will likely need PT/OT evaluation    3. Chronic alcoholism with liver cirrhosis   -normal ammonia level   -normal bilirubin   -monitor for withdrawal; alcohol level was <10 on admission    -[facility] protocol    4. Hyponatremia   -IVF, nephrology consult    5. Hyperglycemia-no history of dm2; monitor    6. GILDA- creatinine of 3.7   -IVF started for dehydration, further IVF management by nephrology   -consulted nephrology   -treat UTI   -obtain urine creatinine and urine sodium to calculate FENa    7. Thrombocytopenia-new; ?related to the sepsis   -no signs of active bleeding at this time   -patient to receive platelets-1 unit ordered in ED.   -monitor and replace per hematology   -can consult hematology    8. Anemia- chronic but worsening acutely   -monitor for bleeding   -check hemoccult stool   -consult hematology   -iron studies    9.  hyopcalcemia-mild   -monitor and replace as needed    10. Seizure disorder   -continue phenytoin 100 mg BID   -monitor for seizures   -check phenytoin level    11. Essential hypertension with current low BP   -discontinue lisinopril 5 mg daily.          Code Status: Full  DVT prophylaxis: lovenox      NOTE: This report was transcribed using voice recognition software. Every effort was made to ensure accuracy; however, inadvertent computerized transcription errors may be present.   Electronically signed by Josie Kong MD on 8/9/2020 at 5:56 PM

## 2020-08-10 PROBLEM — Z99.2 ENCOUNTER REGARDING VASCULAR ACCESS FOR DIALYSIS FOR ESRD (HCC): Status: ACTIVE | Noted: 2020-01-01

## 2020-08-10 PROBLEM — N18.6 ENCOUNTER REGARDING VASCULAR ACCESS FOR DIALYSIS FOR ESRD (HCC): Status: ACTIVE | Noted: 2020-01-01

## 2020-08-10 NOTE — CONSULTS
Vascular SURGERY  CONSULT NOTE  8/10/2020    Physician Consulted: Dr. Nico Woodward  Reason for Consult: temporary dialysis catheter placement   Referring Physician: Dr. Kristal Madrigal    TIAN Montelongo is a 63 y.o. female who presents with AMS and was found to have metabolic acidosis and GILDA in the ED. When I went to see patient she was pleasantly confused. I was able to obtain some information and consent for the daughter. An hour after I saw her a RRT was called. Patient was intubated and moved down to the ICU. Pending platelet count a temporary dialysis line will be placed so that dialysis can take place. Past Medical History:   Diagnosis Date    Cerebral artery occlusion with cerebral infarction ([facility] [address] 04/1989    Hx of blood clots     Hypertension     Seizures ([facility] [address]        Past Surgical History:   Procedure Laterality Date    COLONOSCOPY  2019    ENDOSCOPY, COLON, DIAGNOSTIC      2019    SKIN GRAFT Right     R foot April 2020       Medications Prior to Admission:    Prior to Admission medications    Medication Sig Start Date End Date Taking?  Authorizing Provider   hydrOXYzine (VISTARIL) 50 MG capsule Take 50 mg by mouth 3 times daily as needed for Itching   Yes Historical Provider, MD   lisinopril (PRINIVIL;ZESTRIL) 5 MG tablet Take 5 mg by mouth daily   Yes Historical Provider, MD   DULoxetine (CYMBALTA) 30 MG extended release capsule Take 30 mg by mouth daily   Yes Historical Provider, MD   phenytoin (DILANTIN) 100 MG ER capsule Take by mouth 2 times daily   Yes Historical Provider, MD   doxepin (SINEQUAN) 50 MG capsule Take 50 mg by mouth nightly   Yes Historical Provider, MD       No Known Allergies    Family History   Problem Relation Age of Onset    High Blood Pressure Mother     Diabetes Father     High Blood Pressure Brother     Alcohol Abuse Brother     Substance Abuse Brother        Social History     Tobacco Use    Smoking status: Current Every Day Smoker     Packs/day: 2.00     Types: Cigarettes    Smokeless tobacco: Never Used   Substance Use Topics    Alcohol use: Yes    Drug use: Not on file         Review of Systems   General ROS: negative  Hematological and Lymphatic ROS: negative  Respiratory ROS: no cough but does have some shortness of breath, or wheezing  Cardiovascular ROS: no chest pain or dyspnea on exertion  Gastrointestinal ROS: no abdominal pain, change in bowel habits, or black or bloody stools  Genito-Urinary ROS: no dysuria, trouble voiding, or hematuria  Musculoskeletal ROS: negative      PHYSICAL EXAM:    Vitals:    08/10/20 0740   BP: (!) 88/0   Pulse: 88   Resp: (!) 32   Temp: 97.8 °F (36.6 °C)   SpO2: 97%       General Appearance:  well developed, well nourished, but confused  Skin:  Skin color, texture, turgor normal. No rashes or lesions. Head/face:  NCAT  Eyes:  No gross abnormalities. Lungs:  Normal expansion. Clear to auscultation. No rales, rhonchi, or wheezing. Heart:  Heart sounds are normal.  Regular rate and rhythm without murmur, gallop or rub. Abdomen:  Soft, non-tender, normal bowel sounds. No bruits, organomegaly or masses. Extremities: Extremities warm to touch, pink, with no edema. LABS:    CBC  Recent Labs     08/10/20  0846   WBC 16.5*   HGB 11.8   HCT 36.8   PLT 16*     BMP  Recent Labs     08/10/20  0846      K 6.4*      CO2 6*   BUN 96*   CREATININE 3.8*   CALCIUM 8.6     Liver Function  Recent Labs     20  1510 08/10/20  0846   LIPASE 13  --    BILITOT 0.7 0.9   AST 23 42*   ALT 18 24   ALKPHOS 163* 203*   PROT 5.5* 7.2   LABALBU 2.3* 2.5*     No results for input(s): LACTATE in the last 72 hours.   Recent Labs     20  1323   INR 1.2       RADIOLOGY  Ct Abdomen Pelvis Wo Contrast Additional Contrast? None    Result Date: 2020  Patient MRN:  [id number] : 1955 Age: 63 years Gender: Female Order Date:  2020 12:42 PM EXAM: CT ABDOMEN PELVIS WO CONTRAST NUMBER OF IMAGES \ views:  398 INDICATION:  abd pain, lumbar bruising abd pain, lumbar bruising COMPARISON: None Technique: Multiple computerized tomography sections of the abdomen with sagittal and coronal MPR reconstructions were obtained from the top of the diaphragm to the pelvis. LOWER THORAX: Subpleural reticulations, likely hypoventilatory change. Coronary artery calcifications. ABDOMEN/PELVIS Liver: Nodular liver contour. Gallbladder: Unremarkable. Spleen: Enlarged, measuring 14 cm in craniocaudal dimension. Pancreas: Atrophic pancreas. Adrenals: Unremarkable. Kidneys: Unremarkable. Bowel: Unremarkable. Urinary bladder: Unremarkable. Reproductive: Unremarkable. Lymph nodes: Unremarkable. Vasculature: Vascular calcifications. Peritoneum/retroperitoneum: Unremarkable. Osseous structure/soft tissue: Severe of compression deformity of the L1 vertebral body. Atrophy of the right greater the left hip musculature. Soft tissue calcification lateral to the right greater trochanter. Tortuous vessel in the anterior upper abdomen soft tissues. Findings suggestive of cirrhosis with splenomegaly. No acute abnormality in the abdomen or pelvis. Ct Head Wo Contrast    Result Date: 2020  Patient MRN:  [id number] : 1955 Age: 63 years Gender: Female Order Date:  2020 12:42 PM EXAM: CT HEAD WO CONTRAST INDICATION:  fall, ams fall, ams  COMPARISON: None FINDINGS: PARENCHYMA:No mass effect, edema or hemorrhage. Prominent area of cystic encephalomalacia is seen in the left frontal lobe, extending to the insular cortex and lateral aspect of the basal ganglia. VENTRICLES: No hydrocephalus. Ex vacuo dilation of the left lateral ventricle. CALVARIUM:The calvarium is intact without fracture or focal lesion. SINUSES:The visualized paranasal sinuses and mastoids are clear. 1. No acute intracranial abnormality. 2. Chronic infarction with encephalomalacia in the left frontal lobe, insula and the lateral aspect of the basal ganglia.     Ct Chest Wo Contrast    Result Date: 2020  Patient MRN:  [id number] Patient :  1955 Patient Age:  63 years Patient Gender:  Female Order Date:2020 12:42 PM EXAM:  CT CHEST WO CONTRAST INDICATION:   falls falls  COMPARISON: None. Technique: Low-dose CT  acquisition technique included one of following options; 1 . Automated exposure control, 2. Adjustment of MA and or KV according to patient's size or 3. Use of iterative reconstruction. Multiple axial images were obtained from the apices of the lungs through the lung bases. Sagittal and coronal reconstructions performed for aid in interpretation of the study. Chery Rudy FINDINGS: Mild pleural atelectasis or scarring is seen in the dependent aspects of the lungs. Small linear areas of atelectasis or scarring are seen in the right middle lobe and the left upper lobe. No pulmonary consolidation or contusion is seen. No nodule or mass is visualized. The central airway is clear. The heart is mildly enlarged. Main pulmonary artery is borderline enlarged at 3.1 cm. Mild calcified atherosclerosis seen in the aorta. No aneurysm. No lymphadenopathy is seen. Small hiatal hernia is noted. Small amount of soft tissue along the distal esophagus likely represent varices. The liver is noted to be cirrhotic in morphology. The spleen is enlarged. No ascites is seen in the upper abdomen. Old healed fractures are seen in the right ribs. No acute rib fracture is seen. There are minimal age-indeterminate compression fracture deformities of the T1 and T9 vertebral bodies. 1. Minimal age-indeterminate compression fracture deformities of the T1 and T9 vertebral bodies. If indicated, MRI may be obtained for further evaluation. 2. No other traumatic abnormality is seen. 3. Mild cardiomegaly with evidence of mild pulmonary arterial hypertension.      Ct Cervical Spine Wo Contrast    Result Date: 2020  Patient MRN:  [id number] : 1955 Age: 63 years Gender: Female Order Date:  2020 metabolic Acidoses and GILDA, consulted for Temporary  dialysis catheter placement     PLAN:  - plan is to place right femoral temporary dialysis catheter  - will attempt to transfuse platelets before placing catheter due to thrombocytopenia   - will discuss plan with Dr. Shawn Gunderson     Electronically signed by Ajit Crenshaw DO on 8/10/20 at 12:37 PM EDT    Pt seen and examined    R fem temp dialysis catheter in place    Functioning well for dialysis    Please call if pt requires long term dialysis and tunn line    [employer]

## 2020-08-10 NOTE — PROGRESS NOTES
NUMBER OF IMAGES \ views:  832    INDICATION:  abd pain, lumbar bruising    abd pain, lumbar bruising    COMPARISON: None         Technique: Multiple computerized tomography sections of the abdomen    with sagittal and coronal MPR reconstructions were obtained from the    top of the diaphragm to the pelvis.           LOWER THORAX: Subpleural reticulations, likely hypoventilatory change. Coronary artery calcifications.         ABDOMEN/PELVIS         Liver: Nodular liver contour.         Gallbladder: Unremarkable.         Spleen: Enlarged, measuring 14 cm in craniocaudal dimension.         Pancreas: Atrophic pancreas.         Adrenals: Unremarkable.         Kidneys: Unremarkable.         Bowel: Unremarkable.         Urinary bladder: Unremarkable.         Reproductive: Unremarkable.         Lymph nodes: Unremarkable.         Vasculature: Vascular calcifications.         Peritoneum/retroperitoneum: Unremarkable.         Osseous structure/soft tissue: Severe of compression deformity of the    L1 vertebral body. Atrophy of the right greater the left hip    musculature. Soft tissue calcification lateral to the right greater    trochanter. Tortuous vessel in the anterior upper abdomen soft    tissues.              Impression    Findings suggestive of cirrhosis with splenomegaly. No acute    abnormality in the abdomen or pelvis. Assessment:    Active Problems:    Sepsis ([address]  Resolved Problems:    * No resolved hospital problems. *      Plan:  1.   Sepsis, POA, secondary to complicated UTI, hypotension, tachycardia, leukocytosis; now in septic shock   -patient moved to ICU after RRT              -abnormal urinalysis with large blood, packed WBC and many bacteria (pus-per nursing staff)              -levaphed started for hypotension   -ABX              -urine culture pending   -blood cultures now positive this morning for ecoli and enterobacteriacea   -consult ID     2.  uremic encephalopathy-worsening AMS   -nephrology consulted   -[facility] surg consulted for temporary dialysis catheter, however catheter could not be placed initially due to low platelet count; patient did receive one unit PRBCs, but second that was ordered by hematology had not yet been transfused. -RRT called this morning; patient was intubated and placed on levaphed drip    3. Metabolic acidosis-pCO2 53.8   -pH 7.212   -bicarb drip started by nephrology    4. Thrombocytopenia-new; ?related to the sepsis-DIC vs TTP              -patient to receive platelets-1unit. -further work up ordered by nephrology after discussion with Dr. Sheldon Johnson              -hematology also on board    5. GILDA- creatinine of 3.7 on admission, now 3.8              -FENa consistent with ATN   -nephrology on board   -patient will need either dialysis vs. Plasmapheresis (if TTP confirmed)   -[facility] was consulted for temporary dialysis catheter, however due to low platelets, they wanted to wait for another units of platelets first.    6.  Acute Respiratory failure-secondary to metabolic acidosis and fatigue   -patient intubated   -crackles heard R>L   -CXR obtained and was negative    7. Chronic alcoholism with liver cirrhosis              -normal ammonia level              -normal bilirubin              -monitor for withdrawal; alcohol level was <10 on admission                          -[facility] protocol-patient received ativan 3 mg around 9pm last night     8. Hyponatremia resolved with IVF                 9.  Hyperglycemia-no history of dm2; monitor         10. Anemia- chronic but worsening acutely; concern for TTP              -monitor for bleeding              -hematology consulted     11.  hyopcalcemia-resolved              -monitor and replace as needed     12. Seizure disorder-unknown last dose of medication taken              -patient given IV phenytoin              -monitor for seizures   -level was 8.9     13.   Essential hypertension-with recent hypotension due to sepsis              -discontinue lisinopril 5 mg daily. 14.  Multiple falls at home;    -Multiple age indeterminent compression fractures of the spine                 Code Status: Full  DVT prophylaxis: lovenox    Note:  Following RRT, I discussed the case with Dr. Laraine Prader (nephrology) and Dr. Tomasz Kan (ICU).          NOTE: This report was transcribed using voice recognition software. Every effort was made to ensure accuracy; however, inadvertent computerized transcription errors may be present.   Electronically signed by Gilda Wagner MD on 8/10/2020 at 8:24 AM

## 2020-08-10 NOTE — CONSULTS
[address] [place] [address] [facility]    Consultation Note     Admit Date: 8/9/2020 12:10 PM    Reason for Consult:   Septic shock. Urosepsis    Attending Physician:  Matthew Fregoso MD     Chief Complaint: AMS    HISTORY OF PRESENT ILLNESS:   The patient is a 63 y.o. women not known to the Infectious Diseases service. The patient is admitted to the general floor for urosepsis. Rapid response team was called this morning and patient was intubated and was transferred to ICU. Patient has past medical history of CVA, hypertension, seizure, alcohol use disorder and liver cirrhosis. Patient was brought into the emergency room yesterday by EMS after neighbor called for altered mental status. In the emergency room, patient was only oriented to self and follows simple commands. Patient was noted to be hypotensive and tachycardic. Labs were significant for CO2 9, anion gap 20, lactic acid 1.9, BUN 97, creatinine of 3.7, AST 23, ALT 18, alk phos 163, ammonia 21, WBC of 7.3, hemoglobin 9.8, and platelet 12. Urinalysis showed packed WBC, RBC 5-10, proteinuria, moderate leukocyte esterase, and many bacteria. Per chart review, patient had bladder catheter placed which drained pus per nursing staff. CT head showed chronic infarction but no acute processes. CT chest showed atelectasis in right middle lobe and left upper lobe but no consolidation. CT abdomen pelvis showed liver cirrhosis with splenomegaly. Patient received 30 cc/kg IV fluid and Rocephin and was admitted to the general floor. This morning patient was noted to have agonal respiration. Rapid response was called and patient was intubated. Upon arrival to ICU patient was noted to be hypotensive and patient was started on Jose-Synephrine. Patient was also noted to have decreasing urine output. Patient was decided to be started on hemodialysis and had temporary hemodialysis catheter placed.       Past Medical History:        Diagnosis Date None   Social Needs    Financial resource strain: None    Food insecurity     Worry: None     Inability: None    Transportation needs     Medical: None     Non-medical: None   Tobacco Use    Smoking status: Current Every Day Smoker     Packs/day: 2.00     Types: Cigarettes    Smokeless tobacco: Never Used   Substance and Sexual Activity    Alcohol use: Yes    Drug use: None    Sexual activity: None   Lifestyle    Physical activity     Days per week: None     Minutes per session: None    Stress: None   Relationships    Social connections     Talks on phone: None     Gets together: None     Attends Nondenominational service: None     Active member of club or organization: None     Attends meetings of clubs or organizations: None     Relationship status: None    Intimate partner violence     Fear of current or ex partner: None     Emotionally abused: None     Physically abused: None     Forced sexual activity: None   Other Topics Concern    None   Social History Narrative    None     Tobacco: Current smoker  Alcohol: Yes  Pets: No  Travel: No    Family History:       Problem Relation Age of Onset    High Blood Pressure Mother     Diabetes Father     High Blood Pressure Brother     Alcohol Abuse Brother     Substance Abuse Brother    . Otherwise non-pertinent to the chief complaint. REVIEW OF SYSTEMS:    Unable to obtain as patient is currently unresponsive and intubated    PHYSICAL EXAM:    Vitals:    BP (!) 88/0 Comment: doppler  Pulse 88   Temp 97.8 °F (36.6 °C) (Axillary)   Resp (!) 32   Ht 5' 2\" (1.575 m)   Wt 115 lb 6.4 oz (52.3 kg)   SpO2 97%   BMI 21.11 kg/m²   Constitutional: The patient is intubated but not on any sedation. Patient opens eyes to name but does not follow commands appropriately. Skin: Warm and dry. No rashes were noted. No jaundice. HEENT: Eyes show round, and reactive pupils. Moist mucous membranes, no ulcerations, no thrush. Neck: Supple to movements.  No lymphadenopathy. Chest: Intubated. No secretions suctioned from ET tube. Rhonchi bilaterally  Cardiovascular: S1 and S2 are rhythmic and regular. No murmurs appreciated. Abdomen: Positive bowel sounds to auscultation. Benign to palpation. No masses felt. No hepatosplenomegaly. Genitourinary: Bliss in place but no urine output  Extremities: No clubbing, no cyanosis, no edema. Musculoskeletal: Equal and symmetrical  Neurological: Opens eyes to voice and withdraws to painful stimuli. Does not follow commands.    Lines: 3 peripheral IVs.  Right femoral temporary HD catheter (8/10)      CBC+dif:  Recent Labs     08/09/20  1323 08/10/20  0846   WBC 17.3* 16.5*   HGB 9.8* 11.8   HCT 29.1* 36.8   MCV 99.3 102.5*   PLT 12* 16*   NEUTROABS 15.92* 14.33*     No results found for: CRP  No results found for: CRPHS  No results found for: SEDRATE  Lab Results   Component Value Date    ALT 24 08/10/2020    AST 42 (H) 08/10/2020    ALKPHOS 203 (H) 08/10/2020    BILITOT 0.9 08/10/2020     Lab Results   Component Value Date     08/10/2020    K 6.4 08/10/2020     08/10/2020    CO2 6 08/10/2020    BUN 96 08/10/2020    CREATININE 3.8 08/10/2020    GFRAA 15 08/10/2020    LABGLOM 12 08/10/2020    GLUCOSE 112 08/10/2020    PROT 7.2 08/10/2020    LABALBU 2.5 08/10/2020    CALCIUM 8.6 08/10/2020    BILITOT 0.9 08/10/2020    ALKPHOS 203 08/10/2020    AST 42 08/10/2020    ALT 24 08/10/2020       Lab Results   Component Value Date    PROTIME 13.4 08/09/2020    INR 1.2 08/09/2020       Lab Results   Component Value Date    TSH 3.440 11/12/2016       Lab Results   Component Value Date    COLORU DARK YELLOW 08/09/2020    PHUR 6.5 08/09/2020    WBCUA PACKED 08/09/2020    RBCUA 5-10 08/09/2020    BACTERIA MANY 08/09/2020    CLARITYU CLOUDY 08/09/2020    SPECGRAV 1.020 08/09/2020    LEUKOCYTESUR MODERATE 08/09/2020    UROBILINOGEN 0.2 08/09/2020    BILIRUBINUR SMALL 08/09/2020    BLOODU LARGE 08/09/2020    GLUCOSEU Negative 08/09/2020       No results found for: NBK6MLF, BEART, W9PZNHLK, PHART, THGBART, LHO7WFL, PO2ART, IQX6AYB  Radiology:  XR CHEST PORTABLE   Final Result   No significant acute process               CT HEAD WO CONTRAST   Final Result         1. No acute intracranial abnormality. 2. Chronic infarction with encephalomalacia in the left frontal lobe,   insula and the lateral aspect of the basal ganglia. CT CERVICAL SPINE WO CONTRAST   Final Result   1. No fracture or joint dislocation is seen in the cervical spine. 2. Minimal anterior wedging of the T1 vertebral body indicates an   age-indeterminate fracture. If indicated, MRI may be obtained for   further evaluation. CT CHEST WO CONTRAST   Final Result    1. Minimal age-indeterminate compression fracture deformities of the   T1 and T9 vertebral bodies. If indicated, MRI may be obtained for   further evaluation. 2. No other traumatic abnormality is seen. 3. Mild cardiomegaly with evidence of mild pulmonary arterial   hypertension. CT ABDOMEN PELVIS WO CONTRAST Additional Contrast? None   Final Result   Findings suggestive of cirrhosis with splenomegaly. No acute   abnormality in the abdomen or pelvis. XR CHEST PORTABLE   Final Result   Streaky opacities at the lung bases are nonspecific and may represent   atelectasis, scarring or an acute infectious/inflammatory process. US RETROPERITONEAL COMPLETE    (Results Pending)   XR CHEST PORTABLE    (Results Pending)   XR CHEST PORTABLE    (Results Pending)   XR ABDOMEN FOR NG/OG/NE TUBE PLACEMENT    (Results Pending)       Microbiology:  Pending  Recent Labs     08/09/20  1237   BC Gram stain performed from blood culture bottle media  Gram negative rods  *     Recent Labs     08/09/20  1237   ORG Gram negative danny*     No results for input(s): Eden Cava in the last 72 hours. No results for input(s): STREPNEUMAGU in the last 72 hours.   No results for input(s): LP1UAG in the last 72 hours. No results for input(s): ASO in the last 72 hours. No results for input(s): CULTRESP in the last 72 hours. Assessment:  · Septic shock 2/2 GNR bacteremia due to urosepsis  · Gram negative danny bacteremia  · Complicated UTI - urine culture positive for gram negative danny  · Leukocytosis   · HAGMA 2/2 Uremia and lactic acidosis. On bicarb infusion   · Acute respiratory failure. Currently intubated  · GILDA   · Thrombocytopenia likely 2/2 sepsis and chronic alcohol use  · Alcohol use disorder with liver cirrhosis    Plan:    · Continue ceftriaxone 1 g q 24 hours  · Follow up on identification and sensitivity of blood and urine cultures  · Continue neosynephrine and wean as able. Goal MAP >65  · For dialysis today for uremia   · Monitor labs  · Will follow with you    Thank you for having us see this patient in consultation. I will be discussing this case with the treating physicians. Mary Alexis MD, PGY-3  Internal medicine resident    Pt seen and examined. Agree c above plans as described after review of labs, micro, radiographs and chart.   Electronically signed by Kodak King MD on 8/10/2020 at 1:19 PM

## 2020-08-10 NOTE — PROGRESS NOTES
Spoke to Dr. Lisa Griffin regarding switching dilantin to IV to decrease the risk of aspiration since patient on CIWA scale and withdrawling. Refer to orders obtained.

## 2020-08-10 NOTE — OP NOTE
Arterial Line Placement Procedure Note                     Indication: severe hypotension    Consent: Unable to be obtained due to the emergent nature of this procedure. Rich's Test: Normal    Procedure: The skin over the left femoral artery was prepped with chlorhexidine. Local anesthesia was obtained by infiltration using 1% Lidocaine without epinephrine. A 21 gauge arterial line catheter was then inserted, using a modified Seldinger technique, into the vessel. The transducer set was then attached and securely fastened to the skin with sutures. Waveforms on the monitor were observed and found to be adequate. The patient had good distal perfusion after the procedure. The site was then dressed in a sterile fashion. The patient tolerated the procedure well. Complications: None    [employer]. SAMY Elder,F.C.C.P.

## 2020-08-10 NOTE — PROGRESS NOTES
Notified Dr. Marin Nicely of positive blood culture results. Roosevelt Rivera in ICU updated.   Orin OROZCO  11:49 AM

## 2020-08-10 NOTE — CONSULTS
[facility]      Patient Name: Cathy Bhat  YOB: 1955  PCP: No primary care provider on file. Referring Provider:      Reason for Consultation:   Chief Complaint   Patient presents with    Fatigue     history cva with rt weakness    Fall     multiple falls over last few young. multiple bruises on torso. History of Present Illness: This pt is a 64 yo female with known multiple falls at home and presented to the ER after neighbor called EMS due to [address]. She has known alcoholism and liver cirrhosis. In the ER, she reportedly had pus coming out of the wise catheter after placement. She was admitted with urosepsis to and is currently in the ICU. UA showed large amount of blood and packed WBC with significant bacteria. CBC showed leukocytosis with WBC 17.3 and 92% neutrophils, Hgb was 9.8 (MCV 99) and platelets of 12. Iron profile consistent with AOCD and B12/folate were normal. Retics were inappropriately normal, ESR 67. Hematology has been consulted due to her anemia and thrombocytopenia    Diagnostic Data:     Past Medical History:   Diagnosis Date    Cerebral artery occlusion with cerebral infarction ([facility] [address] 04/1989    Hx of blood clots     Hypertension     Seizures ([facility] [address]        Patient Active Problem List    Diagnosis Date Noted    Sepsis ([facility] [address] 08/09/2020        Past Surgical History:   Procedure Laterality Date    COLONOSCOPY  2019    ENDOSCOPY, COLON, DIAGNOSTIC      2019    SKIN GRAFT Right     R foot April 2020       Family History  Family History   Problem Relation Age of Onset    High Blood Pressure Mother     Diabetes Father     High Blood Pressure Brother     Alcohol Abuse Brother     Substance Abuse Brother        Social History    TOBACCO:   reports that she has been smoking cigarettes. She has been smoking about 2.00 packs per day.  She has never used smokeless tobacco.  ETOH:   reports current alcohol use.    Home Medications  Prior to Admission medications    Medication Sig Start Date End Date Taking? Authorizing Provider   hydrOXYzine (VISTARIL) 50 MG capsule Take 50 mg by mouth 3 times daily as needed for Itching   Yes Historical Provider, MD   lisinopril (PRINIVIL;ZESTRIL) 5 MG tablet Take 5 mg by mouth daily   Yes Historical Provider, MD   DULoxetine (CYMBALTA) 30 MG extended release capsule Take 30 mg by mouth daily   Yes Historical Provider, MD   phenytoin (DILANTIN) 100 MG ER capsule Take by mouth 2 times daily   Yes Historical Provider, MD   doxepin (SINEQUAN) 50 MG capsule Take 50 mg by mouth nightly   Yes Historical Provider, MD       Allergies  No Known Allergies    Review of Systems:    Unable to obtain due to intubation      Objective  BP (!) 107/46   Pulse 99   Temp 97.6 °F (36.4 °C)   Resp 25   Ht 5' 2\" (1.575 m)   Wt 115 lb 4.8 oz (52.3 kg)   SpO2 98%   BMI 21.09 kg/m²     Physical Exam:   Performance Status:  General: sedated  Head and neck : PERRLA, EOMI . Sclera non icteric. Oropharynx : Intubated  Neck: No LAD  LYMPHATICS : No LAD  Heart: Regular rate and regular rhythm, no murmur  Lungs: Clear to auscultation, on vent  Extremities: No edema,no cyanosis, no clubbing.    Abdomen: Soft, no masses  Skin:  No rash  Neurologic: Sedated    Recent Laboratory Data-   Lab Results   Component Value Date    WBC 16.5 (H) 08/10/2020    HGB 11.8 08/10/2020    HCT 25.6 (L) 08/10/2020    .5 (H) 08/10/2020    PLT 16 (LL) 08/10/2020    LYMPHOPCT 5.0 (L) 08/10/2020    RBC 3.59 08/10/2020    MCH 32.9 08/10/2020    MCHC 32.1 08/10/2020    RDW 15.9 (H) 08/10/2020    NEUTOPHILPCT 86.7 (H) 08/10/2020    MONOPCT 4.4 08/10/2020    BASOPCT 0.6 08/10/2020    NEUTROABS 14.33 (H) 08/10/2020    LYMPHSABS 0.82 (L) 08/10/2020    MONOSABS 0.73 08/10/2020    EOSABS 0.00 (L) 08/10/2020    BASOSABS 0.10 08/10/2020       Lab Results   Component Value Date     08/10/2020    K 6.4 (H) 08/10/2020     08/10/2020    CO2 6 (LL) 08/10/2020    BUN 96 (H) 08/10/2020    CREATININE 3.8 (H) 08/10/2020    GLUCOSE 112 (H) 08/10/2020    CALCIUM 8.6 08/10/2020    PROT 5.6 (L) 08/10/2020    LABALBU 1.8 (L) 08/10/2020    BILITOT 0.9 08/10/2020    ALKPHOS 229 (H) 08/10/2020     (H) 08/10/2020    ALT 40 (H) 08/10/2020    LABGLOM 12 08/10/2020    GFRAA 15 08/10/2020       Lab Results   Component Value Date    IRON 16 (L) 08/09/2020    TIBC 111 (L) 08/09/2020    FERRITIN 914 08/09/2020           Radiology-    US RETROPERITONEAL COMPLETE   Final Result   Normal bilateral renal ultrasound               XR ABDOMEN FOR NG/OG/NE TUBE PLACEMENT   Final Result   The tip of the tube is at the expected level of the gastric fundus. XR CHEST PORTABLE   Final Result   Cardiomegaly   Tortuous aorta    There are patchy infiltrates seen throughout both the lung fields. Pneumonia could give this appearance. Underlying edema could also have   this appearance   The chest appears to be worse in the interval                  XR CHEST PORTABLE   Final Result   No significant acute process               CT HEAD WO CONTRAST   Final Result         1. No acute intracranial abnormality. 2. Chronic infarction with encephalomalacia in the left frontal lobe,   insula and the lateral aspect of the basal ganglia. CT CERVICAL SPINE WO CONTRAST   Final Result   1. No fracture or joint dislocation is seen in the cervical spine. 2. Minimal anterior wedging of the T1 vertebral body indicates an   age-indeterminate fracture. If indicated, MRI may be obtained for   further evaluation. CT CHEST WO CONTRAST   Final Result    1. Minimal age-indeterminate compression fracture deformities of the   T1 and T9 vertebral bodies. If indicated, MRI may be obtained for   further evaluation. 2. No other traumatic abnormality is seen. 3. Mild cardiomegaly with evidence of mild pulmonary arterial   hypertension.        CT ABDOMEN PELVIS WO CONTRAST Additional Contrast? None   Final Result   Findings suggestive of cirrhosis with splenomegaly. No acute   abnormality in the abdomen or pelvis. XR CHEST PORTABLE   Final Result   Streaky opacities at the lung bases are nonspecific and may represent   atelectasis, scarring or an acute infectious/inflammatory process. XR CHEST PORTABLE    (Results Pending)         ASSESSMENT/PLAN :  62 yo female  Urosepsis with shock  Resp failure on vent  Renal failure, temp HD cath placed  Anemia  Thrombocytopenia    - Neutrophilic leukocytosis due to urosepsis  - Macrocytic anemia due to cirrhosis from alcohol abuse and myelosuppression from inflammation (ESR 67) due to infection  - Thrombocytopenia due to sequestration with splenomegaly (14 cm on CT A/P w/o contrast), liver cirrhosis, and poor marrow production due to toxic effect of alcohol and myelosuppression from infection  - Transfuse for platelets <07 with sepsis. S/p 1 dose yesterday, will give another unit now  - Transfuse for Hgb <7. This morning CBC with Hgb 11.8, but repeat Hct at 13:15 down to 25.6  - Trend CBC  - Continue abx  - Will follow    Thank you for this consult.  Please call for further questions or concerns      Electronically signed by Deb Bautista MD on 8/10/2020 at 4:39 PM

## 2020-08-10 NOTE — CARE COORDINATION
Social Work/Discharge Planning:  Social Work consult noted. Patient had RRT and was transferred to ICU. Patient is currently intubated. Social Work/ will continue to follow and assist with discharge planning.   Electronically signed by THELMA Echols on 8/10/2020 at 1:30 PM

## 2020-08-10 NOTE — FLOWSHEET NOTE
Patient admitted from 4S to 211, placed on monitor, placed on vent, vital signs obtained as well as abg. Unable to orient patient to room and unit visiting hours due to patient being intubated/sedated. Patient guide at bedside, reviewed patient rights and responsibilities. MRSA nasal swab obtained. Bed alarm on. Call light within reach.

## 2020-08-10 NOTE — PROGRESS NOTES
Notified ICU Ho Flowers to have RN receiving patient call 4S re: positive blood cultures.   [address] [facility]  11:48 AM

## 2020-08-10 NOTE — PROGRESS NOTES
Physician Progress Note      PATIENT:               Levi Marie  CSN #:                  [id number]  :                       1955  ADMIT DATE:       2020 12:10 PM  [address] [place] DATE:  RESPONDING  PROVIDER #:        Denzel Felty MD          QUERY TEXT:    Dear Attending Physician,    Pt admitted with Sepsis UTI, AMS and GILDA . Per H/P  ,\". .Sepsis, POA, secondary   to complicated UTI, hypotension, tachycardia, leukocytosis. [facility] [place] [facility] [place] AMS\"  documented. If possible, please document in progress notes and discharge summary further   specificity regarding the type of Encephalopathy:    The medical record reflects the following:  Risk Factors: Sepsis, UTI, GILDA, Thrombocytopenia  Clinical Indicators: Per ED, \". ..: Oriented to person only? \" Per H/P   ,\". [facility] [place] altered mental status. . cannot answer questions, speaks incoherently? \"     WBC 17.3-16.5 Lactic acid 1.9   97.6 104 20 75/30 - 8/10 97.8 88 32 88/0  Treatment: IV ATB, IVF , IV Levophed ordered in ICU    Thank you,  Verónica Mccullough RN CCDS  Clinical Documentation Improvement Specialist  [phone]  Options provided:  -- Metabolic Encephalopathy  -- Septic Encephalopathy  -- Other - I will add my own diagnosis  -- Disagree - Not applicable / Not valid  -- Disagree - Clinically unable to determine / Unknown  -- Refer to Clinical Documentation Reviewer    PROVIDER RESPONSE TEXT:    This patient has Metabolic Encephalopathy. Query created by: Erica Bailey on 8/10/2020 12:34 PM      QUERY TEXT:    Dear Attending Physician,    Pt admitted with Sepsis UTI, AMS and GILDA . Per H/P  ,\". .Sepsis, POA, secondary   to complicated UTI, hypotension, tachycardia, leukocytosis. [facility] [place] [facility] [place] AMS\"  documented   . Noted documentation of \" Septic Shock\"  by ordered Renal  consultant 8/10 . If possible, please document in progress notes and discharge summary:    The medical record reflects the following:  Risk Factors: Sepsis, UTI, GILDA, Thrombocytopenia  Clinical Indicators: Per ED, \". ..: Oriented to

## 2020-08-10 NOTE — CONSULTS
Department of Internal Medicine  Nephrology Attending Consult Note      Reason for Consult: GILDA  Requesting Physician:  Dr Gandhi Leydi:  Altered mental state    History Obtained From:  spouse, electronic medical record, reason patient could not give history:  altered mental status    HISTORY OF PRESENT ILLNESS:    Edward Laird is a 59-year-old white female with a recent history of altered mental status and multiple falls at home. Her neighbor called EMS yesterday due to her altered mental status. She does have a history of chronic alcoholism. Of note her daughter spoke with her on the phone approximately 1 week ago and at that time the patient was mildly altered. Upon presentation to the emergency department, the patient was unable to answer questions. My history was obtained through the review of the patient's chart. There has been no recent diarrhea. Per the nursing staff the patient had pus come from the bladder catheter when it was placed. After admission, patient was started on iv fluids and antibiotics for possible sepsis and GILDA with hyponatremia. Her serum creatinine was elevated to 3.7 mg/dl from baseline 0.7 therefore this consultation was requested. This morning patient is hypotensive so RRT was called.       Past Medical History:        Diagnosis Date    Cerebral artery occlusion with cerebral infarction ([facility] [address] 04/1989    Hx of blood clots     Hypertension     Seizures ([facility] [address]      Past Surgical History:        Procedure Laterality Date    COLONOSCOPY  2019    ENDOSCOPY, COLON, DIAGNOSTIC      2019    SKIN GRAFT Right     R foot April 2020     Current Medications:    Current Facility-Administered Medications: sodium bicarbonate 75 mEq in sodium chloride 0.45 % 1,000 mL infusion, , Intravenous, Continuous  0.9 % sodium chloride bolus, 20 mL, Intravenous, Once  norepinephrine (LEVOPHED) 16 mg in dextrose 5 % 250 mL infusion, 2 mcg/min, Intravenous, Once  calcium gluconate 10 % injection 1 g, 1 g, Intravenous, Once  flumazenil (ROMAZICON) 0.5 MG/5ML injection, , ,   flumazenil (ROMAZICON) injection 0.2 mg, 0.2 mg, Intravenous, Once  dextrose 50 % IV solution, 12.5 g, Intravenous, PRN  sodium chloride flush 0.9 % injection 10 mL, 10 mL, Intravenous, 2 times per day  sodium chloride flush 0.9 % injection 10 mL, 10 mL, Intravenous, PRN  0.9 % sodium chloride bolus, 20 mL, Intravenous, Once  sodium chloride flush 0.9 % injection 10 mL, 10 mL, Intravenous, 2 times per day  sodium chloride flush 0.9 % injection 10 mL, 10 mL, Intravenous, PRN  acetaminophen (TYLENOL) tablet 650 mg, 650 mg, Oral, Q6H PRN **OR** acetaminophen (TYLENOL) suppository 650 mg, 650 mg, Rectal, Q6H PRN  polyethylene glycol (GLYCOLAX) packet 17 g, 17 g, Oral, Daily PRN  promethazine (PHENERGAN) tablet 12.5 mg, 12.5 mg, Oral, Q6H PRN **OR** ondansetron (ZOFRAN) injection 4 mg, 4 mg, Intravenous, Q6H PRN  thiamine (B-1) injection 100 mg, 100 mg, Intravenous, Daily  vitamin B-12 (CYANOCOBALAMIN) tablet 1,000 mcg, 1,000 mcg, Oral, Daily  folic acid (FOLVITE) tablet 1 mg, 1 mg, Oral, Daily  LORazepam (ATIVAN) tablet 1 mg, 1 mg, Oral, Q1H PRN **OR** LORazepam (ATIVAN) injection 1 mg, 1 mg, Intravenous, Q1H PRN **OR** LORazepam (ATIVAN) tablet 2 mg, 2 mg, Oral, Q1H PRN **OR** LORazepam (ATIVAN) injection 2 mg, 2 mg, Intravenous, Q1H PRN **OR** LORazepam (ATIVAN) tablet 3 mg, 3 mg, Oral, Q1H PRN **OR** LORazepam (ATIVAN) injection 3 mg, 3 mg, Intravenous, Q1H PRN **OR** LORazepam (ATIVAN) tablet 4 mg, 4 mg, Oral, Q1H PRN **OR** LORazepam (ATIVAN) injection 4 mg, 4 mg, Intravenous, Q1H PRN  nicotine (NICODERM CQ) 21 MG/24HR 1 patch, 1 patch, Transdermal, Daily  cefTRIAXone (ROCEPHIN) 1 g in sterile water 10 mL IV syringe, 1 g, Intravenous, Q24H  phenytoin (DILANTIN) 100 mg in sodium chloride 0.9 % 100 mL IVPB (maintenance dose), 100 mg, Intravenous, Q12H  Allergies:  Patient has no known allergies.     Social History:    TOBACCO: Never used tobacco  ETOH:  Alcoholism present   DRUGS:  Never used recreational drugs    Family History:       Problem Relation Age of Onset    High Blood Pressure Mother     Diabetes Father     High Blood Pressure Brother     Alcohol Abuse Brother     Substance Abuse Brother      REVIEW OF SYSTEMS:    Review of systems not obtained due to patient factors - mental status  PHYSICAL EXAM:      Vitals:    VITALS:  BP (!) 88/0 Comment: doppler  Pulse 88   Temp 97.8 °F (36.6 °C) (Axillary)   Resp (!) 32   Ht 5' 2\" (1.575 m)   Wt 115 lb 6.4 oz (52.3 kg)   SpO2 97%   BMI 21.11 kg/m²   24HR INTAKE/OUTPUT:    Intake/Output Summary (Last 24 hours) at 8/10/2020 1110  Last data filed at 8/10/2020 0810  Gross per 24 hour   Intake 1300 ml   Output 25 ml   Net 1275 ml       Constitutional:  Lethargic, not responding  HEENT:  NC/AT, PERRL, pale, no JVD  Respiratory:  Decreased breath sounds at bases, coarse breath sounds  Cardiovascular/Edema:  RR bradycardic, no rub   Gastrointestinal:  Soft, bowel sounds decreased, splenomegaly present  Neurologic:  unresponsive  Skin:  Decreased trugor  Other:  +1    DATA:    CBC:   Lab Results   Component Value Date    WBC 16.5 08/10/2020    RBC 3.59 08/10/2020    HGB 11.8 08/10/2020    HCT 36.8 08/10/2020    .5 08/10/2020    MCH 32.9 08/10/2020    MCHC 32.1 08/10/2020    RDW 15.9 08/10/2020    PLT 16 08/10/2020    MPV NOT CALC 08/10/2020     CMP:    Lab Results   Component Value Date     08/10/2020    K 6.4 08/10/2020     08/10/2020    CO2 6 08/10/2020    BUN 96 08/10/2020    CREATININE 3.8 08/10/2020    GFRAA 15 08/10/2020    LABGLOM 12 08/10/2020    GLUCOSE 112 08/10/2020    PROT 7.2 08/10/2020    LABALBU 2.5 08/10/2020    CALCIUM 8.6 08/10/2020    BILITOT 0.9 08/10/2020    ALKPHOS 203 08/10/2020    AST 42 08/10/2020    ALT 24 08/10/2020     Magnesium:  No results found for: MG  Phosphorus:  No results found for: PHOS  U/A:    Lab Results   Component Value Date COLORU DARK YELLOW 08/09/2020    PROTEINU >=300 08/09/2020    PHUR 6.5 08/09/2020    WBCUA PACKED 08/09/2020    RBCUA 5-10 08/09/2020    BACTERIA MANY 08/09/2020    CLARITYU CLOUDY 08/09/2020    SPECGRAV 1.020 08/09/2020    LEUKOCYTESUR MODERATE 08/09/2020    UROBILINOGEN 0.2 08/09/2020    BILIRUBINUR SMALL 08/09/2020    BLOODU LARGE 08/09/2020    GLUCOSEU Negative 08/09/2020     ABG:    Lab Results   Component Value Date    PH 7.212 08/09/2020    PCO2 22.6 08/09/2020    PO2 136.0 08/09/2020    HCO3 8.9 08/09/2020    BE -17.3 08/09/2020    O2SAT 98.5 08/09/2020     Microalbumen/Creatinine ratio:  No components found for: RUCREAT  TSH:    Lab Results   Component Value Date    TSH 3.440 11/12/2016     VITAMIN B12: No components found for: B12  FOLATE:  No results found for: FOLATE  IRON:    Lab Results   Component Value Date    IRON 16 08/09/2020     Iron Saturation:  No components found for: PERCENTFE  TIBC:    Lab Results   Component Value Date    TIBC 111 08/09/2020     FERRITIN:    Lab Results   Component Value Date    FERRITIN 914 08/09/2020     MANDO:  No results found for: ANATITER, MANDO  AMYLASE:  No results found for: AMYLASE  LIPASE:    Lab Results   Component Value Date    LIPASE 13 08/09/2020     Fibrinogen Level:  No components found for: FIB  Radiology Review:    Findings suggestive of cirrhosis with splenomegaly. No acute    abnormality in the abdomen or pelvis     1. Minimal age-indeterminate compression fracture deformities of the    T1 and T9 vertebral bodies.  If indicated, MRI may be obtained for    further evaluation. 2. No other traumatic abnormality is seen. 3. Mild cardiomegaly with evidence of mild pulmonary arterial    hypertension.           1. No acute intracranial abnormality.     2. Chronic infarction with encephalomalacia in the left frontal lobe,    insula and the lateral aspect of the basal ganglia         IMPRESSION/RECOMMENDATIONS:      Elpidio Rutherford is a 61-year-old white female with a recent history of altered mental status and multiple falls at home. Her neighbor called EMS yesterday due to her altered mental status. She does have a history of chronic alcoholism. Her daughter spoke with her on the phone approximately 1 week ago and at that time the patient was mildly altered. Upon presentation to the emergency department, the patient was unable to answer questions. My history was obtained through the review of the patient's chart. There has been no recent diarrhea. Per the nursing staff the patient had pus come from the bladder catheter when it was placed. After admission, patient was started on iv fluids and antibiotics for possible sepsis and GILDA with hyponatremia. Her serum creatinine was elevated to 3.7 mg/dl from baseline 0.7 therefore this consultation was requested. This morning patient is hypotensive so RRT was called. 1. GILDA - anuric with hypotension most likely secondary to septic shock- will rule out TTP/HUS in view of splenomegaly, mental status changes and severe thrombocytopenia. - severe hyperkalemia noted- will initiate HD support in view of oligoanuria    2. NAGMA secondary to GILDA  3. Mental status changes with history of CVA- multifactorial ?  4. Anemia/thrombocytopenia - ?hemolytic process - hematology on case  5. Sepsis- on Rocephin, ID consulted- on Rocephin      PLAN:  - consult vascular surgery for temporary HD catheter placement. - check urine studies  - check retic count, peripheral smear, haptoglobin   - start levophed drip and keep MAP>60 mm hg  - SLED for 6 hours today with 2 K bath, 35 bicarb  - continue bicarbonate drip  = monitor bmp every 8 hours      Case discussed with Dr Sita Portillo    Thank you for this consultation. ...     Jacinto Rodriguez MD

## 2020-08-10 NOTE — PROCEDURES
Amelia Griffin is a 63 y.o. female patient. 1. Septicemia ([facility] [address]    2. Urinary tract infection without hematuria, site unspecified    3. [facility]    4. Thrombocytopenia ([facility]    5. Altered mental status, unspecified altered mental status type      Past Medical History:   Diagnosis Date    Cerebral artery occlusion with cerebral infarction ([facility] 04/1989    Hx of blood clots     Hypertension     Seizures (H[facility])      Blood pressure (!) 88/0, pulse 88, temperature 97.8 °F (36.6 °C), temperature source Axillary, resp. rate (!) 32, height 5' 2\" (1.575 m), weight 115 lb 6.4 oz (52.3 kg), SpO2 97 %. Intubation    Date/Time: 8/10/2020 11:22 AM  Performed by: Ysabel Roach MD  Authorized by: Ysabel Roach MD   Consent: The procedure was performed in an emergent situation. Verbal consent not obtained. Indications: respiratory failure  Intubation method: video-assisted  Patient status: unconscious  Sedatives: etomidate  Tube size: 8.0 mm  Tube type: cuffed  Number of attempts: 1  Cricoid pressure: no  Cords visualized: yes  Post-procedure assessment: chest rise and ETCO2 monitor  Breath sounds: equal  Cuff inflated: yes  ETT to lip: 23 cm  Tube secured with: ETT gilbert  Chest x-ray findings: Pending CXR.           Ysabel Roach MD  8/10/2020

## 2020-08-10 NOTE — PROGRESS NOTES
Critical Care Team: Daily Progress Note         Date and time: 8/10/2020 12:17 PM    Patient's name:  Cathy Bhat    Medical Record Number: [id number]    Patient's account/billing number: [de-identified]    Patient's YOB: 1955    Age: 63 y.o. Date of Admission: 8/9/2020 12:10 PM    Length of stay during current admission: 1    Primary Care Physician: No primary care provider on file. Previous Pulmonary: None    Code Status: Full Code    PMH:    Past Medical History:   Diagnosis Date    Cerebral artery occlusion with cerebral infarction ([facility] [address] 04/1989    Hx of blood clots     Hypertension     Seizures ([facility] [address]        PSH:   Past Surgical History:   Procedure Laterality Date    COLONOSCOPY  2019    ENDOSCOPY, COLON, DIAGNOSTIC      2019    SKIN GRAFT Right     R foot April 2020       Reason for ICU admission:   1. Sepsis with septic shock  2. Renal failure  3. Metabolic acidosis  4. Profound thrombocytopenia      Subjective:     Events in the past 24 Hours:   1. Seen in the emergency room with gap acidosis and thrombocytopenia as well as GILDA  2. Admitted to a stepdown area  3. ABGs last p.m. with partial respiratory compensation  4. This a.m. agonal respirations, intubated ventilated and transferred.   5. Hypotensive upon arrival to ICU  6. Surgery consulted for line placement for dialysis      Current ventilation:     [x] Ventilator  [] BIPAP/AVAPS  [] Nasal Cannula [] Room Air      Secretions      Amount:  [] Small [] Moderate  _ Large  [x] None  Color:     - White - Colored  - Bloody    Sedation:  RAAS Score:  [] Propofol gtt  [] Fentanyl gtt  [] Ativan gtt   [x] No Sedation    Paralyzed?:  [x] No    [] Yes      Vasopressors:  [x] No    [] Yes    If yes -   [] Levophed       [] Dopamine     [] Vasopressin       [] Dobutamine  [] Phenylephrine         [] Epinephrine    Central line:     [] No   [x] Yes         If yes -       [] Right IJ     [] Left IJ [x] Right Femoral [] Left Femoral                   [] Right Subclavian [] Left Subclavian       Bliss catheter:   [] No   [x] Yes     Urine output:            [] Good   [] Low              [x] Anuric      Objective:     Vital signs:  BP (!) 88/0 Comment: doppler  Pulse 88   Temp 97.8 °F (36.6 °C) (Axillary)   Resp (!) 32   Ht 5' 2\" (1.575 m)   Wt 115 lb 6.4 oz (52.3 kg)   SpO2 97%   BMI 21.11 kg/m²   Tmax over 24 hours:  Temp (24hrs), Av.8 °F (36.6 °C), Min:97.6 °F (36.4 °C), Max:98 °F (36.7 °C)      Patient Vitals for the past 6 hrs:   BP Temp Temp src Pulse Resp SpO2   08/10/20 0740 (!) 88/0 97.8 °F (36.6 °C) Axillary 88 (!) 32 97 %   08/10/20 0645 (!) 111/56 -- -- 102 20 97 %         Intake/Output Summary (Last 24 hours) at 8/10/2020 1217  Last data filed at 8/10/2020 0810  Gross per 24 hour   Intake 1412.5 ml   Output 25 ml   Net 1387.5 ml     Wt Readings from Last 2 Encounters:   08/10/20 115 lb 6.4 oz (52.3 kg)   20 115 lb (52.2 kg)     Body mass index is 21.11 kg/m². Physical examination:    General: Intubated and ventilated with dyssynchrony with mechanical ventilation  Eyes: PERRL. No sclera icterus. No conjunctival injection. ENT: No discharge. Pharynx clear. Neck: Trachea midline. Normal thyroid. Resp: No accessory muscle use. No rales. No wheezing. No rhonchi. CV: Regular rate. Regular rhythm. No murmur or rub. Abd: Non-tender. Non-distended. No masses. No organmegaly. Normal bowel sounds. Skin: Warm and dry. No nodules on exposed extremities. No rash on exposed extremities. Ext: No cyanosis, clubbing, edema  Lymph: No cervical LAD. No supraclavicular LAD. M/S: No cyanosis. No joint deformity. No clubbing. Neuro: Positive pupils/gag/corneals. Normal pain response.     Medications:    Scheduled Meds:   sodium chloride  20 mL Intravenous Once    flumazenil  0.2 mg Intravenous Once    sodium bicarbonate        sodium bicarbonate        sodium bicarbonate  150 mEq Intravenous Once    chlorhexidine  15 mL Mouth/Throat BID    sodium chloride flush  10 mL Intravenous 2 times per day    sodium chloride  20 mL Intravenous Once    sodium chloride flush  10 mL Intravenous 2 times per day    thiamine  100 mg Intravenous Daily    vitamin B-12  1,000 mcg Oral Daily    folic acid  1 mg Oral Daily    nicotine  1 patch Transdermal Daily    cefTRIAXone (ROCEPHIN) IV  1 g Intravenous Q24H    phenytoin (DILANTIN) maintenance dose IVPB  100 mg Intravenous Q12H     Continuous Infusions:   norepinephrine      norepinephrine      sodium bicarbonate infusion       PRN Meds:   dextrose, 12.5 g, PRN  sodium chloride flush, 10 mL, PRN  sodium chloride flush, 10 mL, PRN  acetaminophen, 650 mg, Q6H PRN    Or  acetaminophen, 650 mg, Q6H PRN  polyethylene glycol, 17 g, Daily PRN  promethazine, 12.5 mg, Q6H PRN    Or  ondansetron, 4 mg, Q6H PRN  LORazepam, 1 mg, Q1H PRN    Or  LORazepam, 1 mg, Q1H PRN    Or  LORazepam, 2 mg, Q1H PRN    Or  LORazepam, 2 mg, Q1H PRN    Or  LORazepam, 3 mg, Q1H PRN    Or  LORazepam, 3 mg, Q1H PRN    Or  LORazepam, 4 mg, Q1H PRN    Or  LORazepam, 4 mg, Q1H PRN          Vent Settings (Comprehensive) (if applicable):  Vent Information  Vent Type: 840  SpO2: 97 %  Humidification Source: HME  Additional Respiratory  Assessments  Pulse: 88  Resp: (!) 32  SpO2: 97 %  Position: Supine  Humidification Source: HME  Subglottic Suction Done?: Yes    ABGs:   Recent Labs     08/10/20  1148   PH 6.963*   PCO2 22.2*   PO2 >500.0   HCO3 4.9*   BE -25.3*   O2SAT 99.9*       Laboratory findings:    Complete Blood Count:   Recent Labs     08/09/20  1323 08/10/20  0846   WBC 17.3* 16.5*   HGB 9.8* 11.8   HCT 29.1* 36.8   PLT 12* 16*        Last 3 Blood Glucose:   Recent Labs     08/09/20  1323 08/09/20  1510 08/10/20  0846   GLUCOSE SEE BELOW* 193* 112*        PT/INR:    Lab Results   Component Value Date    PROTIME 13.4 08/09/2020    INR 1.2 08/09/2020     PTT:    Lab Results   Component Value Date APTT 30.7 08/09/2020       Comprehensive Metabolic Profile:   Recent Labs     08/09/20  1323 08/09/20  1510 08/10/20  0846   NA SEE BELOW* 131* 134   K SEE BELOW* 4.4 6.4*   CL SEE BELOW* 102 102   CO2 SEE BELOW* 9* 6*   BUN SEE BELOW* 97* 96*   CREATININE SEE BELOW* 3.7* 3.8*   GLUCOSE SEE BELOW* 193* 112*   CALCIUM SEE BELOW* 8.5* 8.6   PROT SEE BELOW* 5.5* 7.2   LABALBU SEE BELOW* 2.3* 2.5*   BILITOT SEE BELOW* 0.7 0.9   ALKPHOS SEE BELOW* 163* 203*   AST SEE BELOW* 23 42*   ALT SEE BELOW* 18 24      Magnesium: No results found for: MG  Phosphorus: No results found for: PHOS  Ionized Calcium: No results found for: CAION     Urinalysis:     Troponin: No results for input(s): TROPONINI in the last 72 hours. Microbiology past 24h:     Blood cultures:                 [] None drawn      [] Negative             []  Positive (Details: Negative rods)  Urine Culture:                   [] None drawn      [] Negative             []  Positive (Details:  )  Sputum Culture:               [] None drawn       [] Negative             []  Positive (Details:  )   Endotracheal aspirate:     [] None drawn       [] Negative             []  Positive (Details:  )     Other Pertinent Labs and Pathology Results:   1. Radiology/Imaging:     XR CHEST PORTABLE   Final Result   No significant acute process               CT HEAD WO CONTRAST   Final Result         1. No acute intracranial abnormality. 2. Chronic infarction with encephalomalacia in the left frontal lobe,   insula and the lateral aspect of the basal ganglia. CT CERVICAL SPINE WO CONTRAST   Final Result   1. No fracture or joint dislocation is seen in the cervical spine. 2. Minimal anterior wedging of the T1 vertebral body indicates an   age-indeterminate fracture. If indicated, MRI may be obtained for   further evaluation. CT CHEST WO CONTRAST   Final Result    1. Minimal age-indeterminate compression fracture deformities of the   T1 and T9 vertebral bodies. isolation patient, the above physical exam reflects that of the examining physician for the day. Agus Harman M.D. Elena CHRISTIE

## 2020-08-10 NOTE — PROGRESS NOTES
Pulled back ETT 2cm from 23cm to 21cm at the lip, patient had an audible leak so advanced ETT to 22cm at the lip per Dr. Novak Miss

## 2020-08-10 NOTE — PROGRESS NOTES
Spoke to Dr. Gomez Wolbach regarding heparin injections ordered and platelets critical yesterday at 12. Refer to orders obtained.

## 2020-08-10 NOTE — PROCEDURES
Alexei Herman is a 63 y.o. female patient. 1. Septicemia ([facility]    2. Urinary tract infection without hematuria, site unspecified    3. GILDA (acute kidney injury) ([facility]    4. Thrombocytopenia ([facility]    5. Altered mental status, unspecified altered mental status type    6. Acute respiratory failure with hypoxia [facility])      Past Medical History:   Diagnosis Date    Cerebral artery occlusion with cerebral infarction ([facility] 04/1989    Hx of blood clots     Hypertension     Seizures (HCC)      Blood pressure (!) 88/0, pulse 88, temperature 97.8 °F (36.6 °C), temperature source Axillary, resp. rate (!) 32, height 5' 2\" (1.575 m), weight 115 lb 6.4 oz (52.3 kg), SpO2 97 %. Temporary Dialysis Catheter     Date/Time: 8/10/2020 12:46 PM  Performed by: Dulce John DO  Authorized by: Dulce John DO   Consent: Written consent obtained. Risks and benefits: risks, benefits and alternatives were discussed  Consent given by: guardian  Required items: required blood products, implants, devices, and special equipment available  Patient identity confirmed: arm band and hospital-assigned identification number  Time out: Immediately prior to procedure a \"time out\" was called to verify the correct patient, procedure, equipment, support staff and site/side marked as required. Indications: vascular access  Anesthesia: local infiltration    Anesthesia:  Local Anesthetic: lidocaine 1% with epinephrine  Preparation: skin prepped with 2% chlorhexidine  Skin prep agent dried: skin prep agent completely dried prior to procedure  Sterile barriers: all five maximum sterile barriers used - cap, mask, sterile gown, sterile gloves, and large sterile sheet  Hand hygiene: hand hygiene performed prior to central venous catheter insertion  Location details: right femoral  Patient position: flat  Catheter type: triple lumen  Catheter size: 15.5.   Pre-procedure: landmarks identified  Ultrasound guidance: yes  Sterile ultrasound techniques: sterile gel and sterile probe covers were used  Number of attempts: 1  Successful placement: yes  Post-procedure: line sutured and dressing applied  Assessment: blood return through all ports and free fluid flow  Patient tolerance: Patient tolerated the procedure well with no immediate complications          Roxann Stockton DO  8/10/2020    formerly [facility]

## 2020-08-10 NOTE — PATIENT CARE CONFERENCE
[facility] Quality Flow/Interdisciplinary Rounds Progress Note        Quality Flow Rounds held on August 10, 2020    Disciplines Attending:  Bedside Nurse, ,  and Nursing Unit [address] Corrina Merlin was admitted on 8/9/2020 12:10 PM    Anticipated Discharge Date:  Expected Discharge Date: 08/14/20    Disposition:    Adam Score:  Adam Scale Score: 15    Readmission Score:         Discussed patient goal for the day, patient clinical progression, and barriers to discharge.   The following Goal(s) of the Day/Commitment(s) have been identified:    Discharge planning, monitor labs and vitals      Faiza Grant  August 10, 2020

## 2020-08-11 NOTE — PROGRESS NOTES
[address] [place] [address] [facility]  Progress Note      Chief Complaint   Patient presents with    Fatigue     history cva with rt weakness    Fall     multiple falls over last few young. multiple bruises on torso. SUBJECTIVE:  Patient was seen and examined at bedside this morning. Patient is still intubated and sedated. Patient remained afebrile overnight. Patient is currently on 300 mcg/in neosynephrine maintaining MAP >65. Review of systems:  Unable to obtain as patient is sedated and intubated.     Medications:  Scheduled Meds:   cefTRIAXone (ROCEPHIN) IV  1 g Intravenous Q24H    potassium phosphate IVPB  30 mmol Intravenous Once    potassium chloride  20 mEq Intravenous Q1H    magnesium sulfate  2 g Intravenous Once    potassium chloride        pantoprazole  40 mg Intravenous Daily    And    sodium chloride (PF)  10 mL Intravenous Daily    sodium chloride  20 mL Intravenous Once    flumazenil  0.2 mg Intravenous Once    chlorhexidine  15 mL Mouth/Throat BID    sodium chloride flush  10 mL Intravenous 2 times per day    sodium chloride  20 mL Intravenous Once    sodium chloride flush  10 mL Intravenous 2 times per day    thiamine  100 mg Intravenous Daily    vitamin B-12  1,000 mcg Oral Daily    folic acid  1 mg Oral Daily    nicotine  1 patch Transdermal Daily    phenytoin (DILANTIN) maintenance dose IVPB  100 mg Intravenous Q12H     Continuous Infusions:   norepinephrine      phenylephrine (MICHA-SYNEPHRINE) 50mg/250mL infusion 300 mcg/min (08/11/20 0607)    midazolam 5 mg/hr (08/11/20 0835)    sodium chloride 150 mL/hr at 08/11/20 0914     PRN Meds:anticoagulant sodium citrate, dextrose, sodium chloride flush, sodium chloride flush, acetaminophen **OR** acetaminophen, polyethylene glycol, promethazine **OR** ondansetron, LORazepam **OR** LORazepam **OR** LORazepam **OR** LORazepam **OR** LORazepam **OR** LORazepam **OR** LORazepam **OR** LORazepam    OBJECTIVE:  BP (!) 143/83   Pulse 88   Temp 98.4 °F (36.9 °C) (Axillary)   Resp 20   Ht 5' 2\" (1.575 m)   Wt 115 lb 4.8 oz (52.3 kg)   SpO2 100%   BMI 21.09 kg/m²   Temp  Av.8 °F (36.6 °C)  Min: 97.2 °F (36.2 °C)  Max: 99.1 °F (37.3 °C)    Constitutional: The patient is intubated and sedated. 40% peep of 5  Skin: Warm and dry. No rashes were noted. No jaundice. HEENT: Eyes show round, and reactive pupils. Moist mucous membranes, no ulcerations, no thrush. Neck: Supple to movements. No lymphadenopathy. Chest: Intubated. No secretions suctioned from ET tube. Rhonchi bilaterally  Cardiovascular: S1 and S2 are rhythmic and regular. No murmurs appreciated. Abdomen: Positive bowel sounds to auscultation. Benign to palpation. No masses felt. No hepatosplenomegaly. Genitourinary: Bliss in place but no urine output  Extremities: No clubbing, no cyanosis, no edema. Musculoskeletal: Equal and symmetrical  Neurological: sedated  Lines: 2 peripheral IVs. Arterial line - left femoral (8/10).  Temporary HD catheter - right femoral (8/10)        Laboratory and Tests Review:  Lab Results   Component Value Date    WBC 16.5 (H) 2020    WBC 16.0 (H) 2020    WBC 16.5 (H) 08/10/2020    HGB 7.8 (L) 2020    HCT 21.4 (L) 2020    MCV 93.0 2020    PLT 20 (L) 2020     Lab Results   Component Value Date    NEUTROABS 13.26 (H) 2020    NEUTROABS 14.88 (H) 2020    NEUTROABS 14.33 (H) 08/10/2020     No results found for: [facility] [place]  Lab Results   Component Value Date    ALT 58 (H) 2020     (H) 2020    ALKPHOS 240 (H) 2020    BILITOT 1.4 (H) 2020     Lab Results   Component Value Date     2020    K 2.4 2020    K 6.4 08/10/2020    CL 98 2020    CO2 19 2020    BUN 14 2020    CREATININE 1.1 2020    CREATININE 3.8 08/10/2020    CREATININE 3.7 2020    GFRAA >60 2020    LABGLOM 50 2020    GLUCOSE 139 2020 PROT 5.4 08/11/2020    LABALBU 1.9 08/11/2020    CALCIUM 7.2 08/11/2020    BILITOT 1.4 08/11/2020    ALKPHOS 240 08/11/2020     08/11/2020    ALT 58 08/11/2020     Lab Results   Component Value Date    CRP 26.8 (H) 08/10/2020     Lab Results   Component Value Date    SEDRATE 79 (H) 08/10/2020     Radiology:      Microbiology:   Lab Results   Component Value Date    [facility]  08/09/2020     Gram stain performed from blood culture bottle media  Gram negative rods      BC Identification and sensitivity to follow 08/09/2020    ORG Escherichia coli 08/09/2020    ORG Gram negative danny 08/09/2020     Lab Results   Component Value Date    ORG Escherichia coli 08/09/2020    ORG Gram negative danny 08/09/2020     No results found for: WNDABS  Smear, Respiratory   Date Value Ref Range Status   08/10/2020   Final    Group 6: <25 PMN's/LPF and <25 Epithelial cells/LPF  Rare Polymorphonuclear leukocytes  Rare Epithelial cells  Rare Gram positive cocci       No results found for: MPNEUMO, CLAMYDCU, LABLEGI, AFBCX, FUNGSM, LABFUNG  CULTURE, RESPIRATORY   Date Value Ref Range Status   08/10/2020   Preliminary    Growth not present, incubation continues  Oral Pharyngeal Dede absent       No results found for: CXCATHTIP  No results found for: BFCS  No results found for: CXSURG  Urine Culture, Routine   Date Value Ref Range Status   08/09/2020 >100,000 CFU/ml  Final     MRSA Culture Only   Date Value Ref Range Status   10/27/2016 Methicillin resistant Staph aureus not isolated  Final       ASSESSMENT:  · Septic shock 2/2 GNR bacteremia due to urosepsis  · Gram negative danny bacteremia  · Complicated UTI - urine culture positive for E.coli (pan-sensitive)  · Leukocytosis   · HAGMA 2/2 Uremia and lactic acidosis   · Uremia - improved after dialysis  · Lactic acidosis   · Acute respiratory failure.  Currently intubated  · GILDA   · Thrombocytopenia likely 2/2 sepsis and chronic alcohol use  · Alcohol use disorder with liver cirrhosis    PLAN:  · Urine culture positive for E.coli (pansensitive). Blood culture positive for GNR. Identification and sensitivity to follow. Will follow-up on final cultures  · Continue ceftriaxone 1 g every 24 hours  · Monitor labs    Alejandro Baron MD, PGY-3  Internal medicine resident  Pt seen and examined. Above discussed agree with PGY 3 RESIDENT. Labs, cultures, and radiographs reviewed. Face to Face encounter occurred. Changes made as necessary.      Antoine Werner MD  Attending: Dr. Lonnie Main    9:14 AM  8/11/2020

## 2020-08-11 NOTE — PROGRESS NOTES
[facility] Progress Note    Admitting Date and Time: 8/9/2020 12:10 PM  Admit Dx: Sepsis ([facility] [address] [A41.9]  [facility] [A[facility]]    Subjective:  Patient is being followed for Sepsis ([facility] [address] [A41.9]  Sepsis ([facility] [address] [A41.9]   Pt  Intubated and sedated. Daughter at bedside. Updated patient's daughter on the condition. She remains full code, but will bring in living will paperwork. Per RN: no updates at that time. ROS: denies fever, chills, cp, sob, n/v, HA unless stated above.       sodium chloride  20 mL Intravenous Once    flumazenil  0.2 mg Intravenous Once    chlorhexidine  15 mL Mouth/Throat BID    sodium chloride flush  10 mL Intravenous 2 times per day    sodium chloride  20 mL Intravenous Once    sodium chloride flush  10 mL Intravenous 2 times per day    thiamine  100 mg Intravenous Daily    vitamin B-12  1,000 mcg Oral Daily    folic acid  1 mg Oral Daily    nicotine  1 patch Transdermal Daily    cefTRIAXone (ROCEPHIN) IV  1 g Intravenous Q24H    phenytoin (DILANTIN) maintenance dose IVPB  100 mg Intravenous Q12H     anticoagulant sodium citrate, 4.3 mL, Daily PRN  dextrose, 12.5 g, PRN  sodium chloride flush, 10 mL, PRN  sodium chloride flush, 10 mL, PRN  acetaminophen, 650 mg, Q6H PRN    Or  acetaminophen, 650 mg, Q6H PRN  polyethylene glycol, 17 g, Daily PRN  promethazine, 12.5 mg, Q6H PRN    Or  ondansetron, 4 mg, Q6H PRN  LORazepam, 1 mg, Q1H PRN    Or  LORazepam, 1 mg, Q1H PRN    Or  LORazepam, 2 mg, Q1H PRN    Or  LORazepam, 2 mg, Q1H PRN    Or  LORazepam, 3 mg, Q1H PRN    Or  LORazepam, 3 mg, Q1H PRN    Or  LORazepam, 4 mg, Q1H PRN    Or  LORazepam, 4 mg, Q1H PRN         Objective:    BP (!) 143/83   Pulse 83   Temp 99.1 °F (37.3 °C) (Axillary)   Resp 20   Ht 5' 2\" (1.575 m)   Wt 115 lb 4.8 oz (52.3 kg)   SpO2 100%   BMI 21.09 kg/m²     General Appearance: intubated and sedated  Skin: warm and dry, except for hands and feet-cold and dusky; numerous bruises and petechiae  Head: normocephalic and atraumatic  Neck: neck supple and without mass   Pulmonary/Chest: diminished bilaterally  Cardiovascular: normal rate, normal S1 and S2 and no carotid bruits  Abdomen: soft, non-tender, somewhat distended, quiet bowel sounds, no masses; +organomegaly  Extremities: mild cyanosis of the hands and feet, no clubbing and mild edema          Recent Labs     08/09/20  1510 08/10/20  0846 08/11/20  0500   * 134 137   K 4.4 6.4* 2.4*    102 98   CO2 9* 6* 19*   BUN 97* 96* 14   CREATININE 3.7* 3.8* 1.1*   GLUCOSE 193* 112* 139*   CALCIUM 8.5* 8.6 7.2*       Recent Labs     08/10/20  0846 08/10/20  1315 08/11/20  0030 08/11/20  0500   WBC 16.5*  --  16.0* 16.5*   RBC 3.59  --  2.26* 2.30*   HGB 11.8  --  7.5* 7.8*   HCT 36.8 25.6* 21.0* 21.4*   .5*  --  92.9 93.0   MCH 32.9  --  33.2 33.9   MCHC 32.1  --  35.7* 36.4*   RDW 15.9*  --  14.6 14.6   PLT 16*  --  23* 20*   MPV NOT CALC  --  11.4 12.5*     Urine culture  Susceptibility     Escherichia coli (1)     Antibiotic  Interpretation  JOSIANE  Status    ampicillin  Sensitive  <=^2  mcg/mL     ceFAZolin  Sensitive  <=^4  mcg/mL     cefepime  Sensitive  <=^0.12  mcg/mL     cefTRIAXone  Sensitive  <=^0.25  mcg/mL     Confirmatory Extended Spectrum Beta-Lactamase   Neg  mcg/mL     ertapenem  Sensitive  <=^0.12  mcg/mL     gentamicin  Sensitive  <=^1  mcg/mL     levofloxacin  Sensitive  <=^0.12  mcg/mL     nitrofurantoin  Sensitive  <=^16  mcg/mL     piperacillin-tazobactam  Sensitive  <=^4  mcg/mL     trimethoprim-sulfamethoxazole  Sensitive  <=^20  mcg/mL           Radiology:   Order Date:  8/11/2020 5:56 AM         EXAM: XR CHEST PORTABLE one image         INDICATION:  resp failure    resp failure         COMPARISON: August 10, 2020         FINDINGS:    There is borderline cardiac size. Endotracheal tube is 1 cm above the    nesha and needs to be retracted by 2 cm. Nasogastric tube is    unchanged.  There is vascular congestion with the diffuse perihilar and    bilateral infiltrates and pleural effusions.              Impression    Stable abnormal chest with the diffuse perihilar and bilateral    infiltrates and small pleural effusions which may be due to diffuse    pneumonia or edema.         Endotracheal tube close to nesha and needs to be retracted by 2 cm.         ALERT:  THIS IS AN ABNORMAL REPORT          Assessment:    Active Problems:    Sepsis ([facility] [address]    Encounter regarding vascular access for dialysis for ESRD [facility])  Resolved Problems:    * No resolved hospital problems. *      Plan:  1. Sepsis, POA, secondary to complicated UTI, hypotension, tachycardia, leukocytosis; now in septic shock              -patient moved to ICU after RRT 8/10/20   -intubated and sedated with versed   -IVF 0.9 % mL/hr              -abnormal urinalysis with large blood, packed WBC and many bacteria (pus-per nursing staff)   -urine culture growing ecoli-pan sensitive              -levaphed started for hypotension, Now on neosynephrine              -urine culture pending              -blood cultures positive for ecoli and enterobacteriacea              -ID consulted 8/10/20   -patient is currently on rocephin, reviewed the consult note from Dr. Wilkerson Later, and will continue rocephin 1 g IV Q24 hours. -lactic acid 5.0   -sed rate 67     2.  uremic encephalopathy-secondary to GILDA/ATN: GFR was 12;              -nephrology consulted, patient received HD, now GFR is 50              -vasc surg was consulted for temporary dialysis catheter, placed 8/10/20                 3.  Metabolic acidosis-etiology uremia/lactic acidosis              -pH 7.212 on 8/9, worsened to 6.963 after RRT 8/10/20              -bicarb drip started by nephrology at that time   -lactic acid now 5.0    4.   Respiratory alkalosis now present- pH 7.646, PCO2 16.  9, bicarb 18   -patient no longer on bicarb drip   -vent settings AC Peep 5, Vt 600, RR 25   -adjustments per intensivist     5.   Thrombocytopenia-new; related to the sepsis-DIC vs TTP              -patient to receive platelets-2 units; platelet count now 20 (up from 12)              -hematology on board-notes due to sequestration with splenomegaly, liver cirrhosis and poor bone marrow production due to alcohol abuse and myelosuppression from infection.   -suggestion to transfuse when platelets <48    -d-dimer was 4781     6. GILDA- creatinine of 3.7, now 1.1 s/p HD              -FENa was consistent with ATN              -nephrology on board, Dr. Codie Amaya         7. Acute Respiratory failure-secondary to metabolic acidosis and fatigue              -patient intubated on 8/10/20              -crackles heard R>L after intubation              -CXR showed bilateral patchy infiltrates     8.  Chronic alcoholism with liver cirrhosis              -normal ammonia level              -bilirubin now elevated at 1.4,    -alk phos elevated              -monitor for withdrawal; alcohol level was <10 on admission                          -[facility] protocol-patient received ativan 3 mg around 9pm last night   -hypoalbuminemia   -INR 1.3   -ferritin elevated at 914    9. Leukocytosis, neutrophilic-secondary to urosepsis; treat underlying cause     10.  Hyponatremia resolved with IVF; continue to monitor                 11.  Hyperglycemia-no history of dm2; monitor; now on solucortef         12.  Anemia- chronic but worsening acutely; concern for TTP              -monitor for bleeding              -hematology consulted   -iron low at 16, ferritin elevated 914   -per hematology- chronic macrocytic anemia 2/2 cirrhosis and myelosuppression from inflammation/infection   -reticulocyte count elevated 16.3   -haptoglobin normal 158   -B12 >2000, folate 10.5    13.   Hypokalemia-replace and monitor     14.  hyopcalcemia              -BPXTKTH and replace as needed     15.  Seizure disorder-unknown last dose of medication taken              -DXOYAXV

## 2020-08-11 NOTE — PROGRESS NOTES
Patient's Lactic Acid at 0000 was 5.2. Change in VS occurred: HR still elevated in 140's to 952O, BP systolic in 37'Y to 35'S/OXF in 40's to 50's . Phenylephrine is at maximum 200 mcg/hr currently. CBC was done to see if patient was having any blood loss, since her platelets were low today. Dr. Herman Guevara notified about VS changes. Only order given was to hang 1 Liter of NS bolus. Will continue to monitor patient closely.

## 2020-08-11 NOTE — PROGRESS NOTES
Department of Internal Medicine  Nephrology Attending Progress Note      Reason for Consult: GILDA  Requesting Physician:  Dr Verito Bonds:  Altered mental state    History Obtained From:  spouse, electronic medical record, reason patient could not give history:  altered mental status    Sub:  Patient seen and examined bedside in the ICU, events over the last 24 hours reviewed with the ICU RN. She remains anuric with less than 100 mL of urine output in the last 24 hours.   She is on pressor support, bicarbonate drip has been discontinued, she tolerated SLED yesterday without any volume removal  .  I was called by the nurse this morning with multiple electrolyte abnormalities including severe hypokalemia, hypophosphatemia, hypomagnesemia    Past Medical History:        Diagnosis Date    Cerebral artery occlusion with cerebral infarction [facility]) 04/1989    Encounter regarding vascular access for dialysis for ESRD ([facility] [address] 8/10/2020    Hx of blood clots     Hypertension     Seizures ([facility] [address]      Past Surgical History:        Procedure Laterality Date    COLONOSCOPY  2019    ENDOSCOPY, COLON, DIAGNOSTIC      2019    SKIN GRAFT Right     R foot April 2020     Current Medications:    Current Facility-Administered Medications: cefTRIAXone (ROCEPHIN) 1 g in sterile water 10 mL IV syringe, 1 g, Intravenous, Q24H  potassium chloride 20 mEq/50 mL IVPB (Central Line), 20 mEq, Intravenous, Q1H  pantoprazole (PROTONIX) injection 40 mg, 40 mg, Intravenous, Daily **AND** sodium chloride (PF) 0.9 % injection 10 mL, 10 mL, Intravenous, Daily  perflutren lipid microspheres (DEFINITY) injection 1.65 mg, 1.5 mL, Intravenous, ONCE PRN  0.9 % sodium chloride bolus, 20 mL, Intravenous, Once  flumazenil (ROMAZICON) injection 0.2 mg, 0.2 mg, Intravenous, Once  chlorhexidine (PERIDEX) 0.12 % solution 15 mL, 15 mL, Mouth/Throat, BID  phenylephrine (MICHA-SYNEPHRINE) 50 mg in dextrose 5 % 250 mL infusion, 100 mcg/min, Intravenous, Continuous  midazolam (VERSED) 1 mg/mL in D5W infusion, 1 mg/hr, Intravenous, Continuous  anticoagulant sodium citrate 4 GM/100ML solution 0.172 g, 4.3 mL, Intracatheter, Daily PRN  0.9 % sodium chloride infusion, , Intravenous, Continuous  dextrose 50 % IV solution, 12.5 g, Intravenous, PRN  sodium chloride flush 0.9 % injection 10 mL, 10 mL, Intravenous, 2 times per day  sodium chloride flush 0.9 % injection 10 mL, 10 mL, Intravenous, PRN  0.9 % sodium chloride bolus, 20 mL, Intravenous, Once  sodium chloride flush 0.9 % injection 10 mL, 10 mL, Intravenous, 2 times per day  sodium chloride flush 0.9 % injection 10 mL, 10 mL, Intravenous, PRN  acetaminophen (TYLENOL) tablet 650 mg, 650 mg, Oral, Q6H PRN **OR** acetaminophen (TYLENOL) suppository 650 mg, 650 mg, Rectal, Q6H PRN  polyethylene glycol (GLYCOLAX) packet 17 g, 17 g, Oral, Daily PRN  promethazine (PHENERGAN) tablet 12.5 mg, 12.5 mg, Oral, Q6H PRN **OR** ondansetron (ZOFRAN) injection 4 mg, 4 mg, Intravenous, Q6H PRN  thiamine (B-1) injection 100 mg, 100 mg, Intravenous, Daily  vitamin B-12 (CYANOCOBALAMIN) tablet 1,000 mcg, 1,000 mcg, Oral, Daily  folic acid (FOLVITE) tablet 1 mg, 1 mg, Oral, Daily  LORazepam (ATIVAN) tablet 1 mg, 1 mg, Oral, Q1H PRN **OR** LORazepam (ATIVAN) injection 1 mg, 1 mg, Intravenous, Q1H PRN **OR** LORazepam (ATIVAN) tablet 2 mg, 2 mg, Oral, Q1H PRN **OR** LORazepam (ATIVAN) injection 2 mg, 2 mg, Intravenous, Q1H PRN **OR** LORazepam (ATIVAN) tablet 3 mg, 3 mg, Oral, Q1H PRN **OR** LORazepam (ATIVAN) injection 3 mg, 3 mg, Intravenous, Q1H PRN **OR** LORazepam (ATIVAN) tablet 4 mg, 4 mg, Oral, Q1H PRN **OR** LORazepam (ATIVAN) injection 4 mg, 4 mg, Intravenous, Q1H PRN  nicotine (NICODERM CQ) 21 MG/24HR 1 patch, 1 patch, Transdermal, Daily  phenytoin (DILANTIN) 100 mg in sodium chloride 0.9 % 100 mL IVPB (maintenance dose), 100 mg, Intravenous, Q12H  Allergies:  Patient has no known allergies.     Social History:    TOBACCO: Never used tobacco  ETOH:  Alcoholism present   DRUGS:  Never used recreational drugs    Family History:       Problem Relation Age of Onset    High Blood Pressure Mother     Diabetes Father     High Blood Pressure Brother     Alcohol Abuse Brother     Substance Abuse Brother      REVIEW OF SYSTEMS:    Review of systems not obtained due to patient factors - mental status  PHYSICAL EXAM:      Vitals:    VITALS:  BP (!) 143/83   Pulse 80   Temp 98.2 °F (36.8 °C) (Axillary)   Resp 18   Ht 5' 2\" (1.575 m)   Wt 115 lb 4.8 oz (52.3 kg)   SpO2 100%   BMI 21.09 kg/m²   24HR INTAKE/OUTPUT:      Intake/Output Summary (Last 24 hours) at 8/11/2020 1311  Last data filed at 8/11/2020 0640  Gross per 24 hour   Intake 6349 ml   Output 1700 ml   Net 4649 ml     Patient seen and examined bedside in the ICU, she is intubated and sedated, her FiO2 requirement is at 40%.   Constitutional: Sedated, no acute distress  HEENT:  NC/AT, PERRL, pale, no JVD  Respiratory:  Decreased breath sounds at bases, coarse breath sounds  Cardiovascular/Edema: Regular rate and rhythm, no murmurs, no uremic pericardial rub on exam  Gastrointestinal:  Soft, bowel sounds decreased, splenomegaly present, Bliss catheter is in place with less than 50 mL of urine output  Neurologic: Sedated  Skin:  Decreased trugor  Other:  +1 edema in both upper and lower extremities unchanged from before    DATA:    CBC:   Lab Results   Component Value Date    WBC 16.5 08/11/2020    RBC 2.30 08/11/2020    HGB 7.8 08/11/2020    HCT 21.4 08/11/2020    MCV 93.0 08/11/2020    MCH 33.9 08/11/2020    MCHC 36.4 08/11/2020    RDW 14.6 08/11/2020    PLT 20 08/11/2020    MPV 12.5 08/11/2020     CMP:    Lab Results   Component Value Date     08/11/2020    K 2.4 08/11/2020    K 6.4 08/10/2020    CL 98 08/11/2020    CO2 19 08/11/2020    BUN 14 08/11/2020    CREATININE 1.1 08/11/2020    GFRAA >60 08/11/2020    LABGLOM 50 08/11/2020    GLUCOSE 139 08/11/2020    PROT 5.4 08/11/2020    LABALBU 1.9 08/11/2020    CALCIUM 7.2 08/11/2020    BILITOT 1.4 08/11/2020    ALKPHOS 240 08/11/2020     08/11/2020    ALT 58 08/11/2020     Magnesium:    Lab Results   Component Value Date    MG 1.4 08/11/2020     Phosphorus:    Lab Results   Component Value Date    PHOS 0.5 08/11/2020     U/A:    Lab Results   Component Value Date    COLORU DARK YELLOW 08/09/2020    PROTEINU >=300 08/09/2020    PHUR 6.5 08/09/2020    WBCUA PACKED 08/09/2020    RBCUA 5-10 08/09/2020    BACTERIA MANY 08/09/2020    CLARITYU CLOUDY 08/09/2020    SPECGRAV 1.020 08/09/2020    LEUKOCYTESUR MODERATE 08/09/2020    UROBILINOGEN 0.2 08/09/2020    BILIRUBINUR SMALL 08/09/2020    BLOODU LARGE 08/09/2020    GLUCOSEU Negative 08/09/2020     ABG:    Lab Results   Component Value Date    PH 7.646 08/11/2020    PCO2 16.9 08/11/2020    PO2 150.2 08/11/2020    HCO3 18.0 08/11/2020    BE -1.8 08/11/2020    O2SAT 99.4 08/11/2020     Microalbumen/Creatinine ratio:  No components found for: RUCREAT  TSH:    Lab Results   Component Value Date    TSH 3.440 11/12/2016     VITAMIN B12: No components found for: B12  FOLATE:    Lab Results   Component Value Date    FOLATE 10.5 08/09/2020     IRON:    Lab Results   Component Value Date    IRON 16 08/09/2020     Iron Saturation:  No components found for: PERCENTFE  TIBC:    Lab Results   Component Value Date    TIBC 111 08/09/2020     FERRITIN:    Lab Results   Component Value Date    FERRITIN 914 08/09/2020     MANDO:  No results found for: ANATITER, MANDO  AMYLASE:  No results found for: AMYLASE  LIPASE:    Lab Results   Component Value Date    LIPASE 13 08/09/2020     Fibrinogen Level:  No components found for: FIB  Radiology Review:    Findings suggestive of cirrhosis with splenomegaly. No acute    abnormality in the abdomen or pelvis     1.  Minimal age-indeterminate compression fracture deformities of the    T1 and T9 vertebral bodies.  If indicated, MRI may be obtained for    further evaluation. 2. No other traumatic abnormality is seen. 3. Mild cardiomegaly with evidence of mild pulmonary arterial    hypertension.           1. No acute intracranial abnormality. 2. Chronic infarction with encephalomalacia in the left frontal lobe,    insula and the lateral aspect of the basal ganglia         IMPRESSION/RECOMMENDATIONS:      Genny Ventura is a 68-year-old white female with a recent history of altered mental status and multiple falls at home. Her neighbor called EMS yesterday due to her altered mental status. She does have a history of chronic alcoholism. Her daughter spoke with her on the phone approximately 1 week ago and at that time the patient was mildly altered. Upon presentation to the emergency department, the patient was unable to answer questions. My history was obtained through the review of the patient's chart. There has been no recent diarrhea. Per the nursing staff the patient had pus come from the bladder catheter when it was placed. After admission, patient was started on iv fluids and antibiotics for possible sepsis and GILDA with hyponatremia. Her serum creatinine was elevated to 3.7 mg/dl from baseline 0.7 therefore this consultation was requested. This morning patient is hypotensive so RRT was called. 1. GILDA - anuric with hypotension most likely secondary to septic shock- will rule out TTP/HUS in view of splenomegaly, mental status changes and severe thrombocytopenia. - severe hyperkalemia noted- will initiate HD support in view of oligoanuria    2. NAGMA secondary to GILDA  3. Mental status changes with history of CVA- multifactorial ?  4. Anemia/thrombocytopenia - ?hemolytic process - hematology on case  5. Sepsis- on Rocephin, ID consulted- on Rocephin  6. Complicated urinary tract infection  7. Hypokalemia, secondary to poor intake, potassium removal with dialysis  8. Severe hypomagnesemia, nutritional  9.   Hypophosphatemia

## 2020-08-11 NOTE — PLAN OF CARE
Problem: Falls - Risk of:  Goal: Will remain free from falls  Description: Will remain free from falls  Outcome: Met This Shift  Goal: Absence of physical injury  Description: Absence of physical injury  Outcome: Met This Shift     Problem: Skin Integrity:  Goal: Will show no infection signs and symptoms  Description: Will show no infection signs and symptoms  Outcome: Met This Shift  Goal: Absence of new skin breakdown  Description: Absence of new skin breakdown  Outcome: Met This Shift     Problem: Confusion - Acute:  Goal: Absence of continued neurological deterioration signs and symptoms  Description: Absence of continued neurological deterioration signs and symptoms  Outcome: Met This Shift  Goal: Mental status will be restored to baseline  Description: Mental status will be restored to baseline  Outcome: Met This Shift     Problem: Discharge Planning:  Goal: Ability to perform activities of daily living will improve  Description: Ability to perform activities of daily living will improve  Outcome: Met This Shift  Goal: Participates in care planning  Description: Participates in care planning  Outcome: Met This Shift     Problem: Injury - Risk of, Physical Injury:  Goal: Will remain free from falls  Description: Will remain free from falls  Outcome: Met This Shift  Goal: Absence of physical injury  Description: Absence of physical injury  Outcome: Met This Shift     Problem: Mood - Altered:  Goal: Mood stable  Description: Mood stable  Outcome: Met This Shift  Goal: Absence of abusive behavior  Description: Absence of abusive behavior  Outcome: Met This Shift  Goal: Verbalizations of feeling emotionally comfortable while being cared for will increase  Description: Verbalizations of feeling emotionally comfortable while being cared for will increase  Outcome: Met This Shift     Problem: Psychomotor Activity - Altered:  Goal: Absence of psychomotor disturbance signs and symptoms  Description: Absence of psychomotor disturbance signs and symptoms  Outcome: Met This Shift     Problem: Sensory Perception - Impaired:  Goal: Demonstrations of improved sensory functioning will increase  Description: Demonstrations of improved sensory functioning will increase  Outcome: Met This Shift  Goal: Decrease in sensory misperception frequency  Description: Decrease in sensory misperception frequency  Outcome: Met This Shift  Goal: Able to refrain from responding to false sensory perceptions  Description: Able to refrain from responding to false sensory perceptions  Outcome: Met This Shift  Goal: Demonstrates accurate environmental perceptions  Description: Demonstrates accurate environmental perceptions  Outcome: Met This Shift     Problem: Infection, Septic Shock:  Goal: Will show no infection signs and symptoms  Description: Will show no infection signs and symptoms  Outcome: Met This Shift     Problem: Restraint Use - Nonviolent/Non-Self-Destructive Behavior:  Goal: Absence of restraint indications  Description: Absence of restraint indications  Outcome: Met This Shift

## 2020-08-11 NOTE — PATIENT CARE CONFERENCE
..  Intensive Care Daily Quality Rounding Checklist      ICU Team Members: Dr. Gia Wadsworth, charge nurse, bedside nurse, clinical pharmacist and respiratory therapy     ICU Day #: 2    Intubation Date:  8/10/20    Ventilator Day #: 2    Central Line Insertion Date:  8/10/20        Day #: 2     Arterial Line Insertion Date:  8/10/20      Day #: 2    DVT Prophylaxis: N/A    GI Prophylaxis: N/A    Bliss Catheter Insertion Date:  8/9/20       Day #: 3      Continued need (if yes, reason documented and discussed with physician): Yes; strict I's & O's    Skin Issues/ Wounds and ordered treatment discussed on rounds: N/A    Goals/ Plans for the Day: Daily labs, wean vent, maintain BP, titrate ila and give 1 liter NS bolus. Echo ordered HD per Nephrology.  Electrolyte replacement

## 2020-08-11 NOTE — CARE COORDINATION
Social Work/Discharge Planning:  Social Work consult noted. Patient remains intubated. Patient had temporary dialysis catheter placement yesterday for dialysis. Called patient daughter Queenie Brown (ph: [phone]) and completed initial assessment. Explained Social Work role and discussed transition of care/discharge planning. Patient lives with a friend in a first floor apartment. PTA she was independent with no adaptive device. Patient PCP is Dr. Candace Tanner. Queenie Brown states her mother has a snf history at [place] and she prefers to use again if needed at discharge. Briefly discussed LTAC and she states her mother has never been to one in the past.  Patient discharge plan to be determined pending her progress. Will continue to follow and assist with discharge planning.   Electronically signed by THELMA Bonilla on 8/11/2020 at 1:04 PM

## 2020-08-11 NOTE — PROGRESS NOTES
Blood and [address] [place] [address]  Hematology/Oncology      Patient Name: Jennifer Swain  YOB: 1955  PCP: No primary care provider on file. Referring Provider:      Reason for Consultation:   Chief Complaint   Patient presents with    Fatigue     history cva with rt weakness    Fall     multiple falls over last few young. multiple bruises on torso. Subjective : Intubated, sedated on vasopressors, anuric. No bleeding    History of Present Illness: This pt is a 64 yo female with known multiple falls at home and presented to the ER after neighbor called EMS due to [address]. She has known alcoholism and liver cirrhosis. In the ER, she reportedly had pus coming out of the wise catheter after placement. She was admitted with urosepsis to and is currently in the ICU. UA showed large amount of blood and packed WBC with significant bacteria. CBC showed leukocytosis with WBC 17.3 and 92% neutrophils, Hgb was 9.8 (MCV 99) and platelets of 12. Iron profile consistent with AOCD and B12/folate were normal. Retics were inappropriately normal, ESR 67.  Hematology has been consulted due to her anemia and thrombocytopenia    Diagnostic Data:     Past Medical History:   Diagnosis Date    Cerebral artery occlusion with cerebral infarction [facility]) 04/1989    Encounter regarding vascular access for dialysis for ESRD ([facility] [address] 8/10/2020    Hx of blood clots     Hypertension     Seizures ([facility] [address]        Patient Active Problem List    Diagnosis Date Noted    Encounter regarding vascular access for dialysis for ESRD ([facility] [address] 08/10/2020    Sepsis ([facility] [address] 08/09/2020        Past Surgical History:   Procedure Laterality Date    COLONOSCOPY  2019    ENDOSCOPY, COLON, DIAGNOSTIC      2019    SKIN GRAFT Right     R foot April 2020       Family History  Family History   Problem Relation Age of Onset    High Blood Pressure Mother     Diabetes Father     High Blood Pressure Brother     Alcohol Abuse Brother     Substance Abuse Brother Social History    TOBACCO:   reports that she has been smoking cigarettes. She has been smoking about 2.00 packs per day. She has never used smokeless tobacco.  ETOH:   reports current alcohol use. Home Medications  Prior to Admission medications    Medication Sig Start Date End Date Taking? Authorizing Provider   hydrOXYzine (VISTARIL) 50 MG capsule Take 50 mg by mouth 3 times daily as needed for Itching   Yes Historical Provider, MD   lisinopril (PRINIVIL;ZESTRIL) 5 MG tablet Take 5 mg by mouth daily   Yes Historical Provider, MD   DULoxetine (CYMBALTA) 30 MG extended release capsule Take 30 mg by mouth daily   Yes Historical Provider, MD   phenytoin (DILANTIN) 100 MG ER capsule Take by mouth 2 times daily   Yes Historical Provider, MD   doxepin (SINEQUAN) 50 MG capsule Take 50 mg by mouth nightly   Yes Historical Provider, MD       Allergies  No Known Allergies    Review of Systems:    Unable to obtain due to intubation      Objective  BP (!) 108/58   Pulse 83   Temp 98.4 °F (36.9 °C) (Axillary)   Resp 23   Ht 5' 2\" (1.575 m)   Wt 115 lb 4.8 oz (52.3 kg)   SpO2 98%   BMI 21.09 kg/m²     Physical Exam:    General: sedated  Head and neck : PERRLA, EOMI . Sclera non icteric. Oropharynx : Intubated  Neck: No LAD  LYMPHATICS : No LAD  Heart: Regular rate and regular rhythm, no murmur  Lungs: Clear to auscultation, on vent  Extremities: No edema,no cyanosis, no clubbing.    Abdomen: Soft, no masses  Neurologic: Sedated    Recent Laboratory Data-   Lab Results   Component Value Date    WBC 16.5 (H) 08/11/2020    HGB 7.8 (L) 08/11/2020    HCT 21.4 (L) 08/11/2020    MCV 93.0 08/11/2020    PLT 20 (L) 08/11/2020    LYMPHOPCT 6.3 (L) 08/11/2020    RBC 2.30 (L) 08/11/2020    MCH 33.9 08/11/2020    MCHC 36.4 (H) 08/11/2020    RDW 14.6 08/11/2020    NEUTOPHILPCT 80.2 (H) 08/11/2020    MONOPCT 5.5 08/11/2020    BASOPCT 0.2 08/11/2020    NEUTROABS 13.26 (H) 08/11/2020    LYMPHSABS 1.04 (L) 08/11/2020    MONOSABS 0.91 08/11/2020    EOSABS 0.48 08/11/2020    BASOSABS 0.04 08/11/2020       Lab Results   Component Value Date     08/11/2020    K 2.4 (LL) 08/11/2020    CL 98 08/11/2020    CO2 19 (L) 08/11/2020    BUN 14 08/11/2020    CREATININE 1.1 (H) 08/11/2020    GLUCOSE 139 (H) 08/11/2020    CALCIUM 7.2 (L) 08/11/2020    PROT 5.4 (L) 08/11/2020    LABALBU 1.9 (L) 08/11/2020    BILITOT 1.4 (H) 08/11/2020    ALKPHOS 240 (H) 08/11/2020     (H) 08/11/2020    ALT 58 (H) 08/11/2020    LABGLOM 50 08/11/2020    GFRAA >60 08/11/2020       Lab Results   Component Value Date    IRON 16 (L) 08/09/2020    TIBC 111 (L) 08/09/2020    FERRITIN 914 08/09/2020           Radiology-    XR CHEST PORTABLE   Final Result   Stable abnormal chest with the diffuse perihilar and bilateral   infiltrates and small pleural effusions which may be due to diffuse   pneumonia or edema. Endotracheal tube close to nesha and needs to be retracted by 2 cm. ALERT:  THIS IS AN ABNORMAL REPORT            US RETROPERITONEAL COMPLETE   Final Result   Normal bilateral renal ultrasound               XR ABDOMEN FOR NG/OG/NE TUBE PLACEMENT   Final Result   The tip of the tube is at the expected level of the gastric fundus. XR CHEST PORTABLE   Final Result   Cardiomegaly   Tortuous aorta    There are patchy infiltrates seen throughout both the lung fields. Pneumonia could give this appearance. Underlying edema could also have   this appearance   The chest appears to be worse in the interval                  XR CHEST PORTABLE   Final Result   No significant acute process               CT HEAD WO CONTRAST   Final Result         1. No acute intracranial abnormality. 2. Chronic infarction with encephalomalacia in the left frontal lobe,   insula and the lateral aspect of the basal ganglia. CT CERVICAL SPINE WO CONTRAST   Final Result   1. No fracture or joint dislocation is seen in the cervical spine.    2. Minimal anterior wedging of the T1 vertebral body indicates an   age-indeterminate fracture. If indicated, MRI may be obtained for   further evaluation. CT CHEST WO CONTRAST   Final Result    1. Minimal age-indeterminate compression fracture deformities of the   T1 and T9 vertebral bodies. If indicated, MRI may be obtained for   further evaluation. 2. No other traumatic abnormality is seen. 3. Mild cardiomegaly with evidence of mild pulmonary arterial   hypertension. CT ABDOMEN PELVIS WO CONTRAST Additional Contrast? None   Final Result   Findings suggestive of cirrhosis with splenomegaly. No acute   abnormality in the abdomen or pelvis. XR CHEST PORTABLE   Final Result   Streaky opacities at the lung bases are nonspecific and may represent   atelectasis, scarring or an acute infectious/inflammatory process. XR CHEST PORTABLE    (Results Pending)         ASSESSMENT/PLAN :  64 yo female  Urosepsis with shock  Resp failure on vent  Renal failure, temp HD cath placed  Anemia  Thrombocytopenia    - Neutrophilic leukocytosis due to urosepsis  - Macrocytic anemia due to cirrhosis from alcohol abuse and myelosuppression from inflammation (ESR 67) due to infection  - Thrombocytopenia due to sequestration with splenomegaly (14 cm on CT A/P w/o contrast), liver cirrhosis, and poor marrow production due to toxic effect of alcohol and myelosuppression from infection  - Transfuse for platelets <44 with sepsis. S/p 1 dose yesterday, will give another unit now  - Transfuse for Hgb <7. This morning CBC with Hgb 11.8, but repeat Hct at 13:15 down to 25.6  - Trend CBC  - Continue abx  - Will follow    Hb stable at 7.8  Platelets 20  Continue on Vasopressors.   On Rocephin per ID    Electronically signed by Virginia Fong MD on 8/11/2020 at 5:08 PM

## 2020-08-11 NOTE — PROGRESS NOTES
Critical Care Team: Daily Progress Note         Date and time: 8/11/2020 11:23 AM    Patient's name:  Alexei Herman    Medical Record Number: [id number]    Patient's account/billing number: [de-identified]    Patient's YOB: 1955    Age: 63 y.o. Date of Admission: 8/9/2020 12:10 PM    Length of stay during current admission: 2    Primary Care Physician: No primary care provider on file. Previous Pulmonary: None    Code Status: Full Code    PMH:    Past Medical History:   Diagnosis Date    Cerebral artery occlusion with cerebral infarction [facility]) 04/1989    Encounter regarding vascular access for dialysis for ESRD ([facility] [address] 8/10/2020    Hx of blood clots     Hypertension     Seizures ([facility] [address]        PSH:   Past Surgical History:   Procedure Laterality Date    COLONOSCOPY  2019    ENDOSCOPY, COLON, DIAGNOSTIC      2019    SKIN GRAFT Right     R foot April 2020       Reason for ICU admission:   1. Sepsis with septic shock  2. Renal failure  3. Metabolic acidosis  4. Profound thrombocytopenia      Subjective:     Events in the past 48 Hours:   1. Seen in the emergency room with gap acidosis and thrombocytopenia as well as GILDA  2. Admitted to a stepdown area  3. ABGs last p.m. with partial respiratory compensation  4. This a.m. agonal respirations, intubated ventilated and transferred. 5. Hypotensive upon arrival to ICU  6. Surgery consulted for line placement for dialysis  7. Pressors started  8. Levo fed resulted in SVT. 9. Levophed stopped, phenylephrine started. 10. Fluid boluses continued. 11. Pansensitive E. coli cultured from the blood. 12. Alkalemia seen on blood gases, bicarbonate stopped, ventilator adjusted. 13. Electrolytes corrected. 14. Urine output remains negligible. 15. Oxygenation remains excellent. 16. No plan to wean mechanical ventilation with pressor dependent sepsis and ongoing dialysis  17.  Okay to start tube feedings      Current ventilation:     [x] Ventilator  [] BIPAP/AVAPS  [] Nasal Cannula [] Room Air      Secretions      Amount:  [] Small [] Moderate  _ Large  [x] None  Color:     - White - Colored  - Bloody    Sedation:  RAAS Score:  [] Propofol gtt  [] Fentanyl gtt  [] Ativan gtt   [x] No Sedation    Paralyzed?:  [x] No    [] Yes      Vasopressors:  [] No    [x] Yes    If yes -   [] Levophed       [] Dopamine     [] Vasopressin       [] Dobutamine  [x] Phenylephrine         [] Epinephrine    Central line:     [] No   [x] Yes         If yes -       [] Right IJ     [] Left IJ [x] Right Femoral [] Left Femoral                   [] Right Subclavian [] Left Subclavian       Bliss catheter:   [] No   [x] Yes     Urine output:            [] Good   [] Low              [x] Anuric      Objective:     Vital signs:  BP (!) 143/83   Pulse 83   Temp 98.4 °F (36.9 °C) (Axillary)   Resp 21   Ht 5' 2\" (1.575 m)   Wt 115 lb 4.8 oz (52.3 kg)   SpO2 99%   BMI 21.09 kg/m²   Tmax over 24 hours:  Temp (24hrs), Av.8 °F (36.6 °C), Min:97.2 °F (36.2 °C), Max:99.1 °F (37.3 °C)      Patient Vitals for the past 6 hrs:   Temp Temp src Pulse Resp SpO2   20 1000 -- -- 83 21 99 %   20 0900 -- -- 86 20 99 %   20 0800 98.4 °F (36.9 °C) Axillary 88 20 100 %   20 0743 -- -- 83 20 100 %   20 0700 -- -- 90 20 100 %   20 0640 -- -- 91 20 100 %   20 0600 -- -- 92 19 --   20 0536 -- -- 91 20 98 %         Intake/Output Summary (Last 24 hours) at 2020 1123  Last data filed at 2020 0640  Gross per 24 hour   Intake 6349 ml   Output 1700 ml   Net 4649 ml     Wt Readings from Last 2 Encounters:   08/10/20 115 lb 4.8 oz (52.3 kg)   20 115 lb (52.2 kg)     Body mass index is 21.09 kg/m². Physical examination:    General: Intubated and ventilated with dyssynchrony with mechanical ventilation  Eyes: PERRL. No sclera icterus. No conjunctival injection. ENT: No discharge. Pharynx clear. Neck: Trachea midline.  Normal thyroid. Resp: No accessory muscle use. No rales. No wheezing. No rhonchi. CV: Regular rate. Regular rhythm. No murmur or rub. Abd: Non-tender. Non-distended. No masses. No organmegaly. Normal bowel sounds. Skin: Warm and dry. No nodules on exposed extremities. No rash on exposed extremities. Ext: No cyanosis, clubbing, edema  Lymph: No cervical LAD. No supraclavicular LAD. M/S: No cyanosis. No joint deformity. No clubbing. Neuro: Positive pupils/gag/corneals. Normal pain response.     Medications:    Scheduled Meds:   cefTRIAXone (ROCEPHIN) IV  1 g Intravenous Q24H    potassium phosphate IVPB  30 mmol Intravenous Once    potassium chloride  20 mEq Intravenous Q1H    potassium chloride        pantoprazole  40 mg Intravenous Daily    And    sodium chloride (PF)  10 mL Intravenous Daily    sodium chloride  20 mL Intravenous Once    flumazenil  0.2 mg Intravenous Once    chlorhexidine  15 mL Mouth/Throat BID    sodium chloride flush  10 mL Intravenous 2 times per day    sodium chloride  20 mL Intravenous Once    sodium chloride flush  10 mL Intravenous 2 times per day    thiamine  100 mg Intravenous Daily    vitamin B-12  1,000 mcg Oral Daily    folic acid  1 mg Oral Daily    nicotine  1 patch Transdermal Daily    phenytoin (DILANTIN) maintenance dose IVPB  100 mg Intravenous Q12H     Continuous Infusions:   phenylephrine (MICHA-SYNEPHRINE) 50mg/250mL infusion 300 mcg/min (08/11/20 0917)    midazolam 4 mg/hr (08/11/20 0924)    sodium chloride 150 mL/hr at 08/11/20 0914     PRN Meds:   perflutren lipid microspheres, 1.5 mL, ONCE PRN  anticoagulant sodium citrate, 4.3 mL, Daily PRN  dextrose, 12.5 g, PRN  sodium chloride flush, 10 mL, PRN  sodium chloride flush, 10 mL, PRN  acetaminophen, 650 mg, Q6H PRN    Or  acetaminophen, 650 mg, Q6H PRN  polyethylene glycol, 17 g, Daily PRN  promethazine, 12.5 mg, Q6H PRN    Or  ondansetron, 4 mg, Q6H PRN  LORazepam, 1 mg, Q1H PRN    Or  LORazepam, 1 mg, Q1H PRN    Or  LORazepam, 2 mg, Q1H PRN    Or  LORazepam, 2 mg, Q1H PRN    Or  LORazepam, 3 mg, Q1H PRN    Or  LORazepam, 3 mg, Q1H PRN    Or  LORazepam, 4 mg, Q1H PRN    Or  LORazepam, 4 mg, Q1H PRN          Vent Settings (Comprehensive) (if applicable):  Vent Information  $Ventilation: $Subsequent Day  Equipment ID: [id number]  Vent Type: [id number]  Vent Mode: AC/VC  Vt Ordered: 400 mL  Rate Set: 12 bmp  Peak Flow: 60 L/min  Pressure Support: 0 cmH20  FiO2 : 40 %  SpO2: 99 %  SpO2/FiO2 ratio: 247.5  Sensitivity: 3  PEEP/CPAP: 5  I Time/ I Time %: 0 s  Humidification Source: HME  Additional Respiratory  Assessments  Pulse: 83  Resp: 21  SpO2: 99 %  Position: Semi-Hahn's  Humidification Source: HME  Oral Care: Mouth suctioned, Mouth swabbed  Subglottic Suction Done?: Yes    ABGs:   Recent Labs     08/11/20  0515   PH 7.646*   PCO2 16.9*   PO2 150.2*   HCO3 18.0*   BE -1.8   O2SAT 99.4*       Laboratory findings:    Complete Blood Count:   Recent Labs     08/10/20  0846 08/10/20  1315 08/11/20  0030 08/11/20  0500   WBC 16.5*  --  16.0* 16.5*   HGB 11.8  --  7.5* 7.8*   HCT 36.8 25.6* 21.0* 21.4*   PLT 16*  --  23* 20*        Last 3 Blood Glucose:   Recent Labs     08/09/20  1510 08/10/20  0846 08/11/20  0500   GLUCOSE 193* 112* 139*        PT/INR:    Lab Results   Component Value Date    PROTIME 14.9 08/10/2020    INR 1.3 08/10/2020     PTT:    Lab Results   Component Value Date    APTT 32.8 08/10/2020       Comprehensive Metabolic Profile:   Recent Labs     08/09/20  1510 08/10/20  0846  08/11/20  0500   * 134  --  137   K 4.4 6.4*  --  2.4*    102  --  98   CO2 9* 6*  --  19*   BUN 97* 96*  --  14   CREATININE 3.7* 3.8*  --  1.1*   GLUCOSE 193* 112*  --  139*   CALCIUM 8.5* 8.6  --  7.2*   PROT 5.5* 7.2   < > 5.4*   LABALBU 2.3* 2.5*   < > 1.9*   BILITOT 0.7 0.9   < > 1.4*   ALKPHOS 163* 203*   < > 240*   AST 23 42*   < > 238*   ALT 18 24   < > 58*    < > = values in this interval not displayed. Magnesium:   Lab Results   Component Value Date    MG 1.4 08/11/2020     Phosphorus:   Lab Results   Component Value Date    PHOS 0.5 08/11/2020     Ionized Calcium: No results found for: CAION     Urinalysis:     Troponin: No results for input(s): TROPONINI in the last 72 hours. Microbiology past 24h:     Blood cultures:                 [] None drawn      [] Negative             []  Positive (Details: Pansensitive E. coli.)  Urine Culture:                   [] None drawn      [] Negative             []  Positive (Details:  )  Sputum Culture:               [] None drawn       [] Negative             []  Positive (Details:  )   Endotracheal aspirate:     [] None drawn       [] Negative             []  Positive (Details:  )     Other Pertinent Labs and Pathology Results:   1. Radiology/Imaging:     XR CHEST PORTABLE   Final Result   Stable abnormal chest with the diffuse perihilar and bilateral   infiltrates and small pleural effusions which may be due to diffuse   pneumonia or edema. Endotracheal tube close to nesha and needs to be retracted by 2 cm. ALERT:  THIS IS AN ABNORMAL REPORT            US RETROPERITONEAL COMPLETE   Final Result   Normal bilateral renal ultrasound               XR ABDOMEN FOR NG/OG/NE TUBE PLACEMENT   Final Result   The tip of the tube is at the expected level of the gastric fundus. XR CHEST PORTABLE   Final Result   Cardiomegaly   Tortuous aorta    There are patchy infiltrates seen throughout both the lung fields. Pneumonia could give this appearance. Underlying edema could also have   this appearance   The chest appears to be worse in the interval                  XR CHEST PORTABLE   Final Result   No significant acute process               CT HEAD WO CONTRAST   Final Result         1. No acute intracranial abnormality. 2. Chronic infarction with encephalomalacia in the left frontal lobe,   insula and the lateral aspect of the basal ganglia.       CT CERVICAL SPINE WO CONTRAST   Final Result   1. No fracture or joint dislocation is seen in the cervical spine. 2. Minimal anterior wedging of the T1 vertebral body indicates an   age-indeterminate fracture. If indicated, MRI may be obtained for   further evaluation. CT CHEST WO CONTRAST   Final Result    1. Minimal age-indeterminate compression fracture deformities of the   T1 and T9 vertebral bodies. If indicated, MRI may be obtained for   further evaluation. 2. No other traumatic abnormality is seen. 3. Mild cardiomegaly with evidence of mild pulmonary arterial   hypertension. CT ABDOMEN PELVIS WO CONTRAST Additional Contrast? None   Final Result   Findings suggestive of cirrhosis with splenomegaly. No acute   abnormality in the abdomen or pelvis. XR CHEST PORTABLE   Final Result   Streaky opacities at the lung bases are nonspecific and may represent   atelectasis, scarring or an acute infectious/inflammatory process. XR CHEST PORTABLE    (Results Pending)             Assessment:     Active Problems:    Sepsis [facility])    Encounter regarding vascular access for dialysis for ESRD [facility])  Resolved Problems:    * No resolved hospital problems. *      Additional assessment:    1. Sepsis with septic shock, urinary source likely  2. GILDA  3. Metabolic acidosis  4. Thrombocytopenia        Plan:     Wean per protocol:  [x] No   [] Yes  [] N/A    ICU Prophylaxis:  Stress ulcer:  [x] PPI Agent  [] Q6Bbaap [] Sucralfate  [] Other:  VTE:   [] Enoxaparin, contraindicated  [] Unfract. Heparin Subcut  [] EPC Cuffs    Nutrition:  [] NPO [] Tube Feeding (Specify: ) [] TPN  [x] PO (Diet: Diet NPO Effective Now)    Home medications reconciled: [] No  [x] Yes    Insulin drip:   [x] No   [] Yes    Family updated:    [] No   [x] Yes    Transfer Candidate?:   [x] No   [] Yes    Additional plans:  1. Continue volume infusion  2. Antibiotics per ID  3.  No indication for supplemental steroids given cortisol level of 89  4. Dialysis per renal  5. Consideration for wean from mechanical ventilation starting tomorrow  6. Tube feedings          Chart review/lab review/X-ray viewing/documentation: 20 minutes  Assessment: 10 minutes  Conversation patient/family re: prognosis, care options and any end of life issues: 0 minutes  Conversation with staff: 20 minutes  Total critical care time exclusive of procedures: 50 minutes    If the patient is a COVID 19 isolation patient, the above physical exam reflects that of the examining physician for the day. Dodie Medrano M.D. Alejandra BROWN P

## 2020-08-11 NOTE — PLAN OF CARE
Problem: Inadequate oral food/beverage intake (NI-2.1)  Goal: Food and/or Nutrient Delivery  Pt will tolerate nutrition progression  Description: Individualized approach for food/nutrient provision.   Outcome: Ongoing

## 2020-08-11 NOTE — FLOWSHEET NOTE
Dialysis treatment complete & blood rinsed back. Cath ports flushed with NSS and citrate instilled per priming vol. Of cath ports. End caps applied and ports clamped closed. Vital signs stable at present. Remains on versed for sedation & neosynephrine drip and bicarb drips. Jose drip weaned down with treatment. No fluid removal as per Dr.order.     08/10/20 2132   Vital Signs   BP (!) 143/83   Temp 97.4 °F (36.3 °C)   Pulse 121   Resp 24   SpO2 100 %   Weight 115 lb 4.8 oz (52.3 kg)   Weight Method Bed scale   Percent Weight Change 0   Post-Hemodialysis Assessment   Rinseback Volume (ml) 250 ml   Total Liters Processed (l/min) 85 l/min   Dialyzer Clearance Clear   Duration of Treatment (minutes) 360 minutes   Hemodialysis Intake (ml) 1700 ml   Hemodialysis Output (ml) 1700 ml   NET Removed (ml) 0 ml   Tolerated Treatment Fair   Bilateral Breath Sounds Clear;Diminished

## 2020-08-11 NOTE — PROGRESS NOTES
This patient has been tachycardic off and on throughout the shift and also hypotensive, which Dr. Mckenzie Smith was notified about. During day shift yesterday, the patient ran into cardiac events where adenosine had to be administered. So when morning labs resulted today, Potassium was 2.4, Phosphorus was 0.5, and Magnesium was 1.4. Dr. Mckenzie Smith was notified about these results, and his response was , \" I will deal with these in rounds\". So no orders for electrolyte replacements were given at this time.

## 2020-08-12 NOTE — CARE COORDINATION
COVID negative 8/12. Vent/ intubated since 8/10 requiring iv pressors. Receiving NEW hemodialysis via temporary HD catheter. On iv abx. Independent from home PTA. Hx Mickie Head and daughter Heidy Richard requesting Claudine Diony if needed on discharge pending progress.  Will follow Michael Chowdhury

## 2020-08-12 NOTE — PLAN OF CARE
Problem: Falls - Risk of:  Goal: Will remain free from falls  Description: Will remain free from falls  Outcome: Met This Shift  Goal: Absence of physical injury  Description: Absence of physical injury  Outcome: Met This Shift     Problem: Skin Integrity:  Goal: Will show no infection signs and symptoms  Description: Will show no infection signs and symptoms  Outcome: Met This Shift  Goal: Absence of new skin breakdown  Description: Absence of new skin breakdown  Outcome: Met This Shift     Problem: Confusion - Acute:  Goal: Absence of continued neurological deterioration signs and symptoms  Description: Absence of continued neurological deterioration signs and symptoms  Outcome: Met This Shift  Goal: Mental status will be restored to baseline  Description: Mental status will be restored to baseline  Outcome: Met This Shift     Problem: Discharge Planning:  Goal: Ability to perform activities of daily living will improve  Description: Ability to perform activities of daily living will improve  Outcome: Met This Shift  Goal: Participates in care planning  Description: Participates in care planning  Outcome: Met This Shift     Problem: Injury - Risk of, Physical Injury:  Goal: Will remain free from falls  Description: Will remain free from falls  Outcome: Met This Shift  Goal: Absence of physical injury  Description: Absence of physical injury  Outcome: Met This Shift     Problem: Mood - Altered:  Goal: Mood stable  Description: Mood stable  Outcome: Met This Shift  Goal: Absence of abusive behavior  Description: Absence of abusive behavior  Outcome: Met This Shift  Goal: Verbalizations of feeling emotionally comfortable while being cared for will increase  Description: Verbalizations of feeling emotionally comfortable while being cared for will increase  Outcome: Met This Shift     Problem: Psychomotor Activity - Altered:  Goal: Absence of psychomotor disturbance signs and symptoms  Description: Absence of psychomotor disturbance signs and symptoms  Outcome: Met This Shift     Problem: Sensory Perception - Impaired:  Goal: Demonstrations of improved sensory functioning will increase  Description: Demonstrations of improved sensory functioning will increase  Outcome: Met This Shift  Goal: Decrease in sensory misperception frequency  Description: Decrease in sensory misperception frequency  Outcome: Met This Shift  Goal: Able to refrain from responding to false sensory perceptions  Description: Able to refrain from responding to false sensory perceptions  Outcome: Met This Shift  Goal: Demonstrates accurate environmental perceptions  Description: Demonstrates accurate environmental perceptions  Outcome: Met This Shift  Goal: Able to distinguish between reality-based and nonreality-based thinking  Description: Able to distinguish between reality-based and nonreality-based thinking  Outcome: Met This Shift  Goal: Able to interrupt nonreality-based thinking  Description: Able to interrupt nonreality-based thinking  Outcome: Met This Shift     Problem: Sleep Pattern Disturbance:  Goal: Appears well-rested  Description: Appears well-rested  Outcome: Met This Shift     Problem: Infection, Septic Shock:  Goal: Will show no infection signs and symptoms  Description: Will show no infection signs and symptoms  Outcome: Met This Shift     Problem: Restraint Use - Nonviolent/Non-Self-Destructive Behavior:  Goal: Absence of restraint indications  Description: Absence of restraint indications  Outcome: Met This Shift  Goal: Absence of restraint-related injury  Description: Absence of restraint-related injury  Outcome: Met This Shift

## 2020-08-12 NOTE — PROGRESS NOTES
Blood and [address] [place] [address]  Hematology/Oncology      Patient Name: Benji Andres  YOB: 1955  PCP: No primary care provider on file. Referring Provider:      Reason for Consultation:   Chief Complaint   Patient presents with    Fatigue     history cva with rt weakness    Fall     multiple falls over last few young. multiple bruises on torso. Subjective : Intubated, sedated on vasopressors, anuric. No bleeding from IV sites. Fingers/toes dusky/purple    History of Present Illness: This pt is a 62 yo female with known multiple falls at home and presented to the ER after neighbor called EMS due to [address]. She has known alcoholism and liver cirrhosis. In the ER, she reportedly had pus coming out of the wise catheter after placement. She was admitted with urosepsis to and is currently in the ICU. UA showed large amount of blood and packed WBC with significant bacteria. CBC showed leukocytosis with WBC 17.3 and 92% neutrophils, Hgb was 9.8 (MCV 99) and platelets of 12. Iron profile consistent with AOCD and B12/folate were normal. Retics were inappropriately normal, ESR 67.  Hematology has been consulted due to her anemia and thrombocytopenia    Diagnostic Data:     Past Medical History:   Diagnosis Date    Cerebral artery occlusion with cerebral infarction [facility]) 04/1989    Encounter regarding vascular access for dialysis for ESRD ([facility] 8/10/2020    Hx of blood clots     Hypertension     Seizures ([facility]        Patient Active Problem List    Diagnosis Date Noted    Encounter regarding vascular access for dialysis for ESRD ([address] 08/10/2020    Sepsis ([facility] 08/09/2020        Past Surgical History:   Procedure Laterality Date    COLONOSCOPY  2019    ENDOSCOPY, COLON, DIAGNOSTIC      2019    SKIN GRAFT Right     R foot April 2020       Family History  Family History   Problem Relation Age of Onset    High Blood Pressure Mother     Diabetes Father     High Blood Pressure Brother     Alcohol Abuse Brother     Substance Abuse Brother        Social History    TOBACCO:   reports that she has been smoking cigarettes. She has been smoking about 2.00 packs per day. She has never used smokeless tobacco.  ETOH:   reports current alcohol use. Home Medications  Prior to Admission medications    Medication Sig Start Date End Date Taking? Authorizing Provider   hydrOXYzine (VISTARIL) 50 MG capsule Take 50 mg by mouth 3 times daily as needed for Itching   Yes Historical Provider, MD   lisinopril (PRINIVIL;ZESTRIL) 5 MG tablet Take 5 mg by mouth daily   Yes Historical Provider, MD   DULoxetine (CYMBALTA) 30 MG extended release capsule Take 30 mg by mouth daily   Yes Historical Provider, MD   phenytoin (DILANTIN) 100 MG ER capsule Take by mouth 2 times daily   Yes Historical Provider, MD   doxepin (SINEQUAN) 50 MG capsule Take 50 mg by mouth nightly   Yes Historical Provider, MD       Allergies  No Known Allergies    Review of Systems:    Unable to obtain due to intubation      Objective  /61   Pulse 116   Temp 98.6 °F (37 °C) (Oral)   Resp (!) 34   Ht 5' 2\" (1.575 m)   Wt 165 lb 12.6 oz (75.2 kg)   SpO2 94%   BMI 30.32 kg/m²     Physical Exam:    General: sedated  Head and neck : PERRLA, EOMI . Sclera non icteric. Oropharynx : Intubated  Neck: No LAD  LYMPHATICS : No LAD  Heart: Regular rate and regular rhythm, no murmur  Lungs: Clear to auscultation, on vent  Extremities: No edema,no cyanosis, no clubbing.    Abdomen: Soft, no masses  Neurologic: Sedated    Recent Laboratory Data-   Lab Results   Component Value Date    WBC 14.5 (H) 08/12/2020    HGB 8.1 (L) 08/12/2020    HCT 22.9 (L) 08/12/2020    MCV 94.6 08/12/2020    PLT 10 (LL) 08/12/2020    LYMPHOPCT 2.0 (L) 08/12/2020    RBC 2.42 (L) 08/12/2020    MCH 33.5 08/12/2020    MCHC 35.4 (H) 08/12/2020    RDW 15.7 (H) 08/12/2020    NEUTOPHILPCT 92.0 (H) 08/12/2020    MONOPCT 4.0 08/12/2020    BASOPCT 0.0 08/12/2020    NEUTROABS 13.49 (H) 08/12/2020 LYMPHSABS 0.29 (L) 08/12/2020    MONOSABS 0.58 08/12/2020    EOSABS 0.15 08/12/2020    BASOSABS 0.00 08/12/2020       Lab Results   Component Value Date     (L) 08/12/2020    K 3.4 (L) 08/12/2020    CL 96 (L) 08/12/2020    CO2 16 (L) 08/12/2020    BUN 22 08/12/2020    CREATININE 1.7 (H) 08/12/2020    GLUCOSE 222 (H) 08/12/2020    CALCIUM 6.1 (L) 08/12/2020    PROT 5.2 (L) 08/12/2020    LABALBU 1.6 (L) 08/12/2020    BILITOT 0.8 08/12/2020    ALKPHOS 212 (H) 08/12/2020     (H) 08/12/2020    ALT 57 (H) 08/12/2020    LABGLOM 30 08/12/2020    GFRAA 37 08/12/2020       Lab Results   Component Value Date    IRON 16 (L) 08/09/2020    TIBC 111 (L) 08/09/2020    FERRITIN 914 08/09/2020           Radiology-    XR CHEST PORTABLE   Final Result   Patchy bilateral infiltrates               XR CHEST PORTABLE   Final Result   Stable abnormal chest with the diffuse perihilar and bilateral   infiltrates and small pleural effusions which may be due to diffuse   pneumonia or edema. Endotracheal tube close to nesha and needs to be retracted by 2 cm. ALERT:  THIS IS AN ABNORMAL REPORT            US RETROPERITONEAL COMPLETE   Final Result   Normal bilateral renal ultrasound               XR ABDOMEN FOR NG/OG/NE TUBE PLACEMENT   Final Result   The tip of the tube is at the expected level of the gastric fundus. XR CHEST PORTABLE   Final Result   Cardiomegaly   Tortuous aorta    There are patchy infiltrates seen throughout both the lung fields. Pneumonia could give this appearance. Underlying edema could also have   this appearance   The chest appears to be worse in the interval                  XR CHEST PORTABLE   Final Result   No significant acute process               CT HEAD WO CONTRAST   Final Result         1. No acute intracranial abnormality. 2. Chronic infarction with encephalomalacia in the left frontal lobe,   insula and the lateral aspect of the basal ganglia.       CT CERVICAL SPINE WO CONTRAST   Final Result   1. No fracture or joint dislocation is seen in the cervical spine. 2. Minimal anterior wedging of the T1 vertebral body indicates an   age-indeterminate fracture. If indicated, MRI may be obtained for   further evaluation. CT CHEST WO CONTRAST   Final Result    1. Minimal age-indeterminate compression fracture deformities of the   T1 and T9 vertebral bodies. If indicated, MRI may be obtained for   further evaluation. 2. No other traumatic abnormality is seen. 3. Mild cardiomegaly with evidence of mild pulmonary arterial   hypertension. CT ABDOMEN PELVIS WO CONTRAST Additional Contrast? None   Final Result   Findings suggestive of cirrhosis with splenomegaly. No acute   abnormality in the abdomen or pelvis. XR CHEST PORTABLE   Final Result   Streaky opacities at the lung bases are nonspecific and may represent   atelectasis, scarring or an acute infectious/inflammatory process. XR CHEST PORTABLE    (Results Pending)         ASSESSMENT/PLAN :  64 yo female  Urosepsis with shock  Resp failure on vent  Renal failure, temp HD cath placed  Anemia  Thrombocytopenia    - Neutrophilic leukocytosis due to urosepsis  - Macrocytic anemia due to cirrhosis from alcohol abuse and myelosuppression from inflammation (ESR 67) due to infection  - Thrombocytopenia due to sequestration with splenomegaly (14 cm on CT A/P w/o contrast), liver cirrhosis, and poor marrow production due to toxic effect of alcohol and myelosuppression from infection  - Transfuse for platelets <92 with sepsis. S/p 1 dose yesterday, will give another unit now  - Transfuse for Hgb <7. This morning CBC with Hgb 11.8, but repeat Hct at 13:15 down to 25.6  - Trend CBC  - Continue abx  - Will follow    Hb stable at 7.8  Platelets 20  Continue on Vasopressors. On Rocephin per ID    8/12/20  - Platelets 10 this AM, s/p 1 unit.  Transfuse for goal of 20  - Hgb stable 8.1  - Leukocytosis slightly better at 57.5 (neutrophilic)  - Remains on pressor support  - Continue abx    Electronically signed by Oz Miranda MD on 8/12/2020 at 2:10 PM

## 2020-08-12 NOTE — FLOWSHEET NOTE
08/12/20 1505   Vital Signs   BP (!) 143/67   Temp 98 °F (36.7 °C)   Pulse 110   Resp 17   Weight 165 lb 5.5 oz (75 kg)   Weight Method Estimated   Percent Weight Change -0.27   Pain Assessment   Pain Assessment CPOT   Pain Level 0   Post-Hemodialysis Assessment   Post-Treatment Procedures Blood returned;Catheter capped, clamped and heparinized x 2 ports   Rinseback Volume (ml) 300 ml   Dialyzer Clearance Clear   Duration of Treatment (minutes) 240 minutes   Hemodialysis Output (ml) 800 ml   NET Removed (ml) 500 ml   Tolerated Treatment Good   Patient Response to Treatment tolerated well   Bilateral Breath Sounds Diminished   Edema Generalized   Physician Notified?  Yes

## 2020-08-12 NOTE — PROGRESS NOTES
[address] [place] [address] [facility]  Progress Note      Chief Complaint   Patient presents with    Fatigue     history cva with rt weakness    Fall     multiple falls over last few young. multiple bruises on torso. SUBJECTIVE:  Patient was seen and examined at bedside this morning. Patient is still intubated and sedated. Patient remained afebrile overnight. Neosynephrine has been weaned down to 150 mcg/min maintaining MAP >65.      24-hour changes:  -Micha-Synephrine weaned down from 275 mcg/min to 150 mcg/min  -Blood culture returned positive for pansensitive E. Coli  -Respiratory culture positive for gram-negative rods and rare gram-positive cocci        Review of systems:  Unable to obtain as patient is sedated and intubated.     Medications:  Scheduled Meds:   albumin human  25 g Intravenous See Admin Instructions    sodium chloride  250 mL Intravenous Once    cefTRIAXone (ROCEPHIN) IV  1 g Intravenous Q24H    pantoprazole  40 mg Intravenous Daily    And    sodium chloride (PF)  10 mL Intravenous Daily    sodium chloride  20 mL Intravenous Once    flumazenil  0.2 mg Intravenous Once    chlorhexidine  15 mL Mouth/Throat BID    sodium chloride flush  10 mL Intravenous 2 times per day    sodium chloride  20 mL Intravenous Once    sodium chloride flush  10 mL Intravenous 2 times per day    thiamine  100 mg Intravenous Daily    vitamin B-12  1,000 mcg Oral Daily    folic acid  1 mg Oral Daily    nicotine  1 patch Transdermal Daily    phenytoin (DILANTIN) maintenance dose IVPB  100 mg Intravenous Q12H     Continuous Infusions:   phenylephrine (MICHA-SYNEPHRINE) 50mg/250mL infusion 150 mcg/min (08/12/20 0646)    midazolam 3 mg/hr (08/11/20 1315)    sodium chloride 150 mL/hr (08/12/20 0558)     PRN Meds:perflutren lipid microspheres, anticoagulant sodium citrate, dextrose, sodium chloride flush, sodium chloride flush, acetaminophen **OR** acetaminophen, polyethylene glycol, promethazine **OR** ondansetron, LORazepam **OR** LORazepam **OR** LORazepam **OR** LORazepam **OR** LORazepam **OR** LORazepam **OR** LORazepam **OR** LORazepam    OBJECTIVE:  BP (!) 108/58   Pulse 104   Temp 98.5 °F (36.9 °C) (Oral)   Resp 29   Ht 5' 2\" (1.575 m)   Wt 165 lb 12.6 oz (75.2 kg)   SpO2 96%   BMI 30.32 kg/m²   Temp  Av.2 °F (36.8 °C)  Min: 97.2 °F (36.2 °C)  Max: 98.9 °F (37.2 °C)    Constitutional: The patient is intubated and sedated. 40% peep of 5  Skin: Warm and dry. No rashes were noted. No jaundice. Cyanosis in fingers and toes in all 4 extremities  HEENT: Eyes show round, and reactive pupils. Moist mucous membranes, no ulcerations, no thrush. Neck: Supple to movements. No lymphadenopathy. Chest: Intubated. No secretions suctioned from ET tube. Rhonchus bilaterally  Cardiovascular: S1 and S2 are rhythmic and regular. No murmurs appreciated. Abdomen: Started on TF; Positive bowel sounds to auscultation. Benign to palpation. No masses felt. No hepatosplenomegaly. Genitourinary: Bliss in place but no urine output  Extremities: No clubbing, no edema; cyanosis in all fingers and toes in all 4 extremities  Musculoskeletal: Equal and symmetrical  Neurological: sedated; withdraws to pain while off sedation  Lines: 2 peripheral IVs. Arterial line - left femoral (8/10).  Temporary HD catheter - right femoral (8/10)        Laboratory and Tests Review:  Lab Results   Component Value Date    WBC 14.5 (H) 2020    WBC 16.5 (H) 2020    WBC 16.0 (H) 2020    HGB 8.1 (L) 2020    HCT 22.9 (L) 2020    MCV 94.6 2020    PLT 10 (LL) 2020     Lab Results   Component Value Date    NEUTROABS 13.49 (H) 2020    NEUTROABS 13.26 (H) 2020    NEUTROABS 14.88 (H) 2020     No results found for: [facility]  Lab Results   Component Value Date    ALT 57 (H) 2020     (H) 2020    ALKPHOS 212 (H) 2020    BILITOT 0.8 2020     Lab Results   Component Value Date     08/12/2020    K 3.4 08/12/2020    K 6.4 08/10/2020    CL 96 08/12/2020    CO2 16 08/12/2020    BUN 22 08/12/2020    CREATININE 1.7 08/12/2020    CREATININE 1.4 08/11/2020    CREATININE 1.1 08/11/2020    GFRAA 37 08/12/2020    LABGLOM 30 08/12/2020    GLUCOSE 222 08/12/2020    PROT 5.2 08/12/2020    LABALBU 1.6 08/12/2020    CALCIUM 6.1 08/12/2020    BILITOT 0.8 08/12/2020    ALKPHOS 212 08/12/2020     08/12/2020    ALT 57 08/12/2020     Lab Results   Component Value Date    CRP 26.8 (H) 08/10/2020     Lab Results   Component Value Date    SEDRATE 79 (H) 08/10/2020     Radiology:      Microbiology:   Lab Results   Component Value Date    ORG Gram negative danny 08/10/2020    ORG Escherichia coli 08/09/2020    ORG Escherichia coli 08/09/2020     Lab Results   Component Value Date    ORG Gram negative danny 08/10/2020    ORG Escherichia coli 08/09/2020    ORG Escherichia coli 08/09/2020     No results found for: WNDABS  Smear, Respiratory   Date Value Ref Range Status   08/10/2020   Final    Group 6: <25 PMN's/LPF and <25 Epithelial cells/LPF  Rare Polymorphonuclear leukocytes  Rare Epithelial cells  Rare Gram positive cocci       No results found for: MPNEUMO, CLAMYDCU, LABLEGI, AFBCX, FUNGSM, LABFUNG  CULTURE, RESPIRATORY   Date Value Ref Range Status   08/10/2020 (A)  Preliminary    Growth not present, incubation continues  Oral Pharyngeal Dede absent  Oral Pharyngeal Dede reduced     08/10/2020   Preliminary    Light growth  Identification and sensitivity to follow       No results found for: CXCATHTIP  No results found for: BFCS  No results found for: CXSURG  Urine Culture, Routine   Date Value Ref Range Status   08/09/2020 >100,000 CFU/ml  Final     MRSA Culture Only   Date Value Ref Range Status   10/27/2016 Methicillin resistant Staph aureus not isolated  Final       ASSESSMENT:  · Septic shock 2/2 E.coli bacteremia due to urosepsis  · E.coli bacteremia  · Complicated UTI - urine culture positive for E.coli (pan-sensitive)  · Respiratory culture positive for gram-negative rods  · Leukocytosis - trending down   · HAGMA 2/2 Uremia and lactic acidosis   · Uremia - improved after dialysis  · Lactic acidosis - improving   · Acute respiratory failure. Currently intubated  · Anuric GILDA - currently on dialysis   · Thrombocytopenia likely 2/2 sepsis and chronic alcohol use  · Alcohol use disorder with liver cirrhosis    PLAN:  · Urine and blood culture positive for E.coli (pansensitive)  · Respiratory culture positive for gram-negative rods  · Currently on ceftriaxone 1 g every 24 hours. Switch to Zosyn 3.375 g q 12 hours  · Monitor labs    Jessica Franco MD, PGY-3  Internal medicine resident      Pt seen and examined. Above discussed agree with PGY 3 RESIDENT. Labs, cultures, and radiographs reviewed. Face to Face encounter occurred. Changes made as necessary.      Jaime Romo MD      9:13 AM  8/12/2020

## 2020-08-12 NOTE — PROGRESS NOTES
Perfect served Dr. Nu Hernandez Ionized calcium 0.9, potassium 3.3, na 131, co2 24, bun 13, creat 1.2

## 2020-08-12 NOTE — PROGRESS NOTES
DAILY VENTILATOR WEANING ASSESSMENT PERFORMED    P/FIO2 Ratio = 286        (<100= do not Wean)                  Cs =  20                        (<32= Instability)  Plat. Pressure = 27  MV =11.7  RSBI =    Instabilities:       Cardiovascular =       CNS =       Respiratory =low compliance       Metabolic =parameters    no    Wean per protocol  no    Ask Physician for a weaning plan no    Additional Comments:     Performed by Bandar Borja RRT      Reference Table:    Cardiovascular     CNS      1. Mean BP less than or equal to 75   1. Neuromuscular blockade  2. Heart Rate greater than 130   2. RASS of -3, -4, -5  3. Myocardial Ischemia    3. RASS of +3, +4  4. Mechanical Assist Device    4. ICP greater than 15 or             Intracranial Hypertension         Respiratory      Metabolic  1. PEEP equal to or greater than 10cm/H20  1. Temp. (8hrs) less than 95 or > 103  2. Respiratory Rate greater than 35   2. WBC < 5000 or > 71978  3. Minute Volume greater than 15L  4. pH less than 7.30  5.  Deteriorating chest X-ray

## 2020-08-12 NOTE — PROGRESS NOTES
[facility] Progress Note    Admitting Date and Time: 8/9/2020 12:10 PM  Admit Dx: [facility]epsis ([facility] [A41.9]  Sepsis ([facility] [A41.9]    Subjective:  Patient is being followed for Sepsis ([facility] [A41.9]  Sepsis ([facility] [A41.9]   Pt  Intubated and sedated. She remains full code, but will bring in living will paperwork. Per RN: dialysis today. ROS: denies fever, chills, cp, sob, n/v, HA unless stated above.       cefTRIAXone (ROCEPHIN) IV  1 g Intravenous Q24H    pantoprazole  40 mg Intravenous Daily    And    sodium chloride (PF)  10 mL Intravenous Daily    sodium chloride  20 mL Intravenous Once    flumazenil  0.2 mg Intravenous Once    chlorhexidine  15 mL Mouth/Throat BID    sodium chloride flush  10 mL Intravenous 2 times per day    sodium chloride  20 mL Intravenous Once    sodium chloride flush  10 mL Intravenous 2 times per day    thiamine  100 mg Intravenous Daily    vitamin B-12  1,000 mcg Oral Daily    folic acid  1 mg Oral Daily    nicotine  1 patch Transdermal Daily    phenytoin (DILANTIN) maintenance dose IVPB  100 mg Intravenous Q12H     perflutren lipid microspheres, 1.5 mL, ONCE PRN  anticoagulant sodium citrate, 4.3 mL, Daily PRN  dextrose, 12.5 g, PRN  sodium chloride flush, 10 mL, PRN  sodium chloride flush, 10 mL, PRN  acetaminophen, 650 mg, Q6H PRN    Or  acetaminophen, 650 mg, Q6H PRN  polyethylene glycol, 17 g, Daily PRN  promethazine, 12.5 mg, Q6H PRN    Or  ondansetron, 4 mg, Q6H PRN  LORazepam, 1 mg, Q1H PRN    Or  LORazepam, 1 mg, Q1H PRN    Or  LORazepam, 2 mg, Q1H PRN    Or  LORazepam, 2 mg, Q1H PRN    Or  LORazepam, 3 mg, Q1H PRN    Or  LORazepam, 3 mg, Q1H PRN    Or  LORazepam, 4 mg, Q1H PRN    Or  LORazepam, 4 mg, Q1H PRN         Objective:    BP (!) 108/58   Pulse 101   Temp 98 °F (36.7 °C) (Oral)   Resp 27   Ht 5' 2\" (1.575 m)   Wt 165 lb 12.6 oz (75.2 kg)   SpO2 98%   BMI 30.32 kg/m²     General Appearance: intubated and sedated  Skin: warm and dry, except for hands and feet-cold and dusky; numerous bruises and petechiae  Head: normocephalic and atraumatic  Neck: neck supple and without mass   Pulmonary/Chest: diminished bilaterally  Cardiovascular: normal rate, normal S1 and S2 and no carotid bruits  Abdomen: soft, non-tender, somewhat distended, quiet bowel sounds, no masses; +organomegaly  Extremities:significant cyanosis & mottling of the hands and feet, no clubbing and mild edema          Recent Labs     08/11/20  0500 08/11/20  1737 08/12/20  0500    131* 128*   K 2.4* 3.5 3.4*   CL 98 99 96*   CO2 19* 19* 16*   BUN 14 18 22   CREATININE 1.1* 1.4* 1.7*   GLUCOSE 139* 241* 222*   CALCIUM 7.2* 6.0* 6.1*       Recent Labs     08/11/20  0030 08/11/20  0500 08/12/20  0500   WBC 16.0* 16.5* 14.5*   RBC 2.26* 2.30* 2.42*   HGB 7.5* 7.8* 8.1*   HCT 21.0* 21.4* 22.9*   MCV 92.9 93.0 94.6   MCH 33.2 33.9 33.5   MCHC 35.7* 36.4* 35.4*   RDW 14.6 14.6 15.7*   PLT 23* 20* 10*   MPV 11.4 12.5* 12.3*     Urine culture  Susceptibility     Escherichia coli (1)     Antibiotic  Interpretation  JOSIANE  Status    ampicillin  Sensitive  <=^2  mcg/mL     ceFAZolin  Sensitive  <=^4  mcg/mL     cefepime  Sensitive  <=^0.12  mcg/mL     cefTRIAXone  Sensitive  <=^0.25  mcg/mL     Confirmatory Extended Spectrum Beta-Lactamase   Neg  mcg/mL     ertapenem  Sensitive  <=^0.12  mcg/mL     gentamicin  Sensitive  <=^1  mcg/mL     levofloxacin  Sensitive  <=^0.12  mcg/mL     nitrofurantoin  Sensitive  <=^16  mcg/mL     piperacillin-tazobactam  Sensitive  <=^4  mcg/mL     trimethoprim-sulfamethoxazole  Sensitive  <=^20  mcg/mL           Radiology:   Order Date:  8/11/2020 5:56 AM         EXAM: XR CHEST PORTABLE one image         INDICATION:  resp failure    resp failure         COMPARISON: August 10, 2020         FINDINGS:    There is borderline cardiac size. Endotracheal tube is 1 cm above the    nesha and needs to be retracted by 2 cm. Nasogastric tube is    unchanged.  There is vascular congestion with the diffuse perihilar and    bilateral infiltrates and pleural effusions.              Impression    Stable abnormal chest with the diffuse perihilar and bilateral    infiltrates and small pleural effusions which may be due to diffuse    pneumonia or edema.         Endotracheal tube close to nesha and needs to be retracted by 2 cm.         ALERT:  THIS IS AN ABNORMAL REPORT          Assessment:    Active Problems:    Sepsis ([address]    Encounter regarding vascular access for dialysis for ESRD [facility])  Resolved Problems:    * No resolved hospital problems. *      Plan:  1. Sepsis, POA, secondary to complicated UTI, hypotension, tachycardia, leukocytosis; now in septic shock              -patient moved to ICU after RRT 8/10/20   -intubated and sedated with versed   -IVF 0.9 % mL/hr              -abnormal urinalysis with large blood, packed WBC and many bacteria (pus-per nursing staff)   -urine culture growing ecoli-pan sensitive              -levaphed started for hypotension, Now on neosynephrine              -urine culture pending              -blood cultures positive for ecoli and enterobacteriacea              -ID consulted 8/10/20   -patient is currently on rocephin, reviewed the consult note from Dr. Lashonda Jha, and will continue rocephin 1 g IV Q24 hours. -lactic acid 5.0   -sed rate 67     2.  uremic encephalopathy-secondary to GILDA/ATN: GFR was 12;              -nephrology consulted, patient received HD, now GFR is 50              -vasc surg was consulted for temporary dialysis catheter, placed 8/10/20                 3.  Metabolic acidosis-etiology uremia/lactic acidosis              -pH 7.212 on 8/9, worsened to 6.963 after RRT 8/10/20              -bicarb drip started by nephrology at that time   -lactic acid now 5.0    4.   Respiratory alkalosis now present- pH 7.646, PCO2 16.  9, bicarb 18   -patient no longer on bicarb drip   -vent settings AC Peep 5, Vt 600, RR 25   -adjustments phenytoin              -monitor for seizures/precautions              -level was 8.9 on 8/9/20     16.  Essential hypertension-with recent hypotension due to sepsis              -home lisinopril on hold   -pressors per intensivist     17. Multiple falls at home;               -Multiple age indeterminent compression fractures of the spine                  Code Status: Full  DVT prophylaxis: lovenox      NOTE: This report was transcribed using voice recognition software. Every effort was made to ensure accuracy; however, inadvertent computerized transcription errors may be present.   Electronically signed by Yung Hernández MD on 8/12/2020 at 7:39 AM

## 2020-08-12 NOTE — PROGRESS NOTES
Spoke with Dr. Fernando Arreola due to increased peak pressures on the ventilator and patient not synchronizing with the ventilator, also versed increased to 10mg/hr and gave ativan 2mg Iv. .  States, \"She is not set up on the ventilator right. Ill stop by later. I have a bunch of things to do right now. \"    Paged twice, NOT a new problem with this patient as peak flows are usually too low. This was reviewed with respiratory previously but not with this nurse. CXR reviewed.

## 2020-08-12 NOTE — PROGRESS NOTES
Called Dr. Farideh Mak answering service due to increased peak pressures, and not synchronizing with ventilator. Versed increased to 10cc/hr. Gave ativan 2mg Iv. Respiratory already aware and suctioned patient. Chest xray done.

## 2020-08-12 NOTE — PROGRESS NOTES
Critical Care Team: Daily Progress Note         Date and time: 8/12/2020 12:02 PM    Patient's name:  Dyan Lanza    Medical Record Number: [id number]    Patient's account/billing number: [de-identified]    Patient's YOB: 1955    Age: 63 y.o. Date of Admission: 8/9/2020 12:10 PM    Length of stay during current admission: 3    Primary Care Physician: No primary care provider on file. Previous Pulmonary: None    Code Status: Full Code    PMH:    Past Medical History:   Diagnosis Date    Cerebral artery occlusion with cerebral infarction [facility]) 04/1989    Encounter regarding vascular access for dialysis for ESRD ([facility] [address] 8/10/2020    Hx of blood clots     Hypertension     Seizures ([facility] [address]        PSH:   Past Surgical History:   Procedure Laterality Date    COLONOSCOPY  2019    ENDOSCOPY, COLON, DIAGNOSTIC      2019    SKIN GRAFT Right     R foot April 2020       Reason for ICU admission:   1. Sepsis with septic shock  2. Renal failure  3. Metabolic acidosis  4. Profound thrombocytopenia      Subjective:     Events in the past 24 Hours:   1. Dialyzed  2. Lactic acidosis persists  3. Respiratory alkalosis is compensatory  4. Thrombocytopenia persists  5. Albumin trending downward leading to third spacing  6. Fingers discolored from ongoing phenylephrine  7.  Continued volume resuscitation with normal ejection fraction and now risk of third spacing      Current ventilation:     [x] Ventilator  [] BIPAP/AVAPS  [] Nasal Cannula [] Room Air      Secretions      Amount:  [] Small [] Moderate  _ Large  [x] None  Color:     - White - Colored  - Bloody    Sedation:  RAAS Score:  [] Propofol gtt  [] Fentanyl gtt  [] Ativan gtt   [x] No Sedation    Paralyzed?:  [x] No    [] Yes      Vasopressors:  [] No    [x] Yes    If yes -   [] Levophed       [] Dopamine     [] Vasopressin       [] Dobutamine  [x] Phenylephrine         [] Epinephrine    Central line:     [] No   [x] Yes         If yes - [] Right IJ     [] Left IJ [x] Right Femoral [] Left Femoral                   [] Right Subclavian [] Left Subclavian       Bliss catheter:   [] No   [x] Yes     Urine output:            [] Good   [] Low              [x] Anuric      Objective:     Vital signs:  /61   Pulse 120   Temp 98.6 °F (37 °C) (Oral)   Resp (!) 34   Ht 5' 2\" (1.575 m)   Wt 165 lb 12.6 oz (75.2 kg)   SpO2 97%   BMI 30.32 kg/m²   Tmax over 24 hours:  Temp (24hrs), Av.2 °F (36.8 °C), Min:97.2 °F (36.2 °C), Max:98.9 °F (37.2 °C)      Patient Vitals for the past 6 hrs:   BP Temp Temp src Pulse Resp SpO2   20 1159 113/61 98.6 °F (37 °C) Oral 120 (!) 34 97 %   20 1132 -- -- -- 118 (!) 31 (!) 88 %   20 1115 -- -- -- 110 -- --   20 1110 -- 98 °F (36.7 °C) -- 106 (!) 31 100 %   20 1100 -- -- -- 106 -- --   20 0900 -- -- -- 104 29 96 %   20 0800 -- 98.5 °F (36.9 °C) Oral 101 27 97 %   20 0743 -- -- -- 101 28 97 %   20 0644 -- -- -- 101 27 98 %         Intake/Output Summary (Last 24 hours) at 2020 1202  Last data filed at 2020 1132  Gross per 24 hour   Intake 8788.2 ml   Output 75 ml   Net 8713.2 ml     Wt Readings from Last 2 Encounters:   20 165 lb 12.6 oz (75.2 kg)   20 115 lb (52.2 kg)     Body mass index is 30.32 kg/m². Physical examination:    General: Intubated and ventilated with dyssynchrony with mechanical ventilation  Eyes: PERRL. No sclera icterus. No conjunctival injection. ENT: No discharge. Pharynx clear. Neck: Trachea midline. Normal thyroid. Resp: No accessory muscle use. No rales. No wheezing. No rhonchi. CV: Regular rate. Regular rhythm. No murmur or rub. Abd: Non-tender. Non-distended. No masses. No organmegaly. Normal bowel sounds. Skin: Warm and dry. No nodules on exposed extremities. No rash on exposed extremities. Ext: No cyanosis, clubbing, edema. Fingertips are becoming discolored  Lymph: No cervical LAD.  No supraclavicular LAD. M/S: No cyanosis. No joint deformity. No clubbing. Neuro: Positive pupils/gag/corneals. Normal pain response.     Medications:    Scheduled Meds:   albumin human  25 g Intravenous See Admin Instructions    sodium chloride  250 mL Intravenous Once    insulin lispro  0-12 Units Subcutaneous Q6H    albumin human  25 g Intravenous Q6H    piperacillin-tazobactam  3.375 g Intravenous Q12H    pantoprazole  40 mg Intravenous Daily    And    sodium chloride (PF)  10 mL Intravenous Daily    sodium chloride  20 mL Intravenous Once    flumazenil  0.2 mg Intravenous Once    chlorhexidine  15 mL Mouth/Throat BID    sodium chloride flush  10 mL Intravenous 2 times per day    sodium chloride  20 mL Intravenous Once    sodium chloride flush  10 mL Intravenous 2 times per day    thiamine  100 mg Intravenous Daily    vitamin B-12  1,000 mcg Oral Daily    folic acid  1 mg Oral Daily    nicotine  1 patch Transdermal Daily    phenytoin (DILANTIN) maintenance dose IVPB  100 mg Intravenous Q12H     Continuous Infusions:   norepinephrine      dextrose      vasopressin (Septic Shock) infusion      sodium chloride 75 mL/hr at 08/12/20 1130    sodium chloride      phenylephrine (MICHA-SYNEPHRINE) 50mg/250mL infusion 100 mcg/min (08/12/20 1136)    midazolam 4 mg/hr (08/12/20 1120)     PRN Meds:   glucose, 15 g, PRN  glucagon (rDNA), 1 mg, PRN  dextrose, 100 mL/hr, PRN  perflutren lipid microspheres, 1.5 mL, ONCE PRN  anticoagulant sodium citrate, 4.3 mL, Daily PRN  dextrose, 12.5 g, PRN  sodium chloride flush, 10 mL, PRN  sodium chloride flush, 10 mL, PRN  acetaminophen, 650 mg, Q6H PRN    Or  acetaminophen, 650 mg, Q6H PRN  polyethylene glycol, 17 g, Daily PRN  promethazine, 12.5 mg, Q6H PRN    Or  ondansetron, 4 mg, Q6H PRN  LORazepam, 1 mg, Q1H PRN    Or  LORazepam, 1 mg, Q1H PRN    Or  LORazepam, 2 mg, Q1H PRN    Or  LORazepam, 2 mg, Q1H PRN    Or  LORazepam, 3 mg, Q1H PRN    Or  LORazepam, 3 mg, Q1H PRN    Or  LORazepam, 4 mg, Q1H PRN    Or  LORazepam, 4 mg, Q1H PRN          Vent Settings (Comprehensive) (if applicable):  Vent Information  $Ventilation: $Subsequent Day  Equipment ID: [id number]  Vent Type: [id number]  Vent Mode: AC/VC  Vt Ordered: 400 mL  Rate Set: 12 bmp  Peak Flow: 50 L/min  Pressure Support: 0 cmH20  FiO2 : 40 %  SpO2: 97 %  SpO2/FiO2 ratio: 220  Sensitivity: 3  PEEP/CPAP: 5  I Time/ I Time %: 0 s  Humidification Source: HME  Additional Respiratory  Assessments  Pulse: 120  Resp: (!) 34  SpO2: 97 %  Position: Semi-Hahn's  Humidification Source: HME  Oral Care: Mouth suctioned, Mouth moisturizer, Mouth swabbed  Subglottic Suction Done?: Yes    ABGs:   Recent Labs     08/12/20  0512   PH 7.366   PCO2 24.6*   PO2 114.4*   HCO3 13.8*   BE -10.3*   O2SAT 98.5       Laboratory findings:    Complete Blood Count:   Recent Labs     08/11/20  0030 08/11/20  0500 08/12/20  0500   WBC 16.0* 16.5* 14.5*   HGB 7.5* 7.8* 8.1*   HCT 21.0* 21.4* 22.9*   PLT 23* 20* 10*        Last 3 Blood Glucose:   Recent Labs     08/11/20  0500 08/11/20  1737 08/12/20  0500   GLUCOSE 139* 241* 222*        PT/INR:    Lab Results   Component Value Date    PROTIME 12.4 08/12/2020    INR 1.1 08/12/2020     PTT:    Lab Results   Component Value Date    APTT 25.5 08/12/2020       Comprehensive Metabolic Profile:   Recent Labs     08/11/20  0500 08/11/20  1737 08/12/20  0500    131* 128*   K 2.4* 3.5 3.4*   CL 98 99 96*   CO2 19* 19* 16*   BUN 14 18 22   CREATININE 1.1* 1.4* 1.7*   GLUCOSE 139* 241* 222*   CALCIUM 7.2* 6.0* 6.1*   PROT 5.4*  --  5.2*   LABALBU 1.9*  --  1.6*   BILITOT 1.4*  --  0.8   ALKPHOS 240*  --  212*   *  --  118*   ALT 58*  --  57*      Magnesium:   Lab Results   Component Value Date    MG 1.6 08/12/2020     Phosphorus:   Lab Results   Component Value Date    PHOS 2.7 08/12/2020     Ionized Calcium: No results found for: CAION     Urinalysis:     Troponin: No results for input(s): TROPONINI in the last 72 hours. Microbiology past 24h:     Blood cultures:                 [] None drawn      [] Negative             []  Positive (Details: Pansensitive E. coli.)  Urine Culture:                   [] None drawn      [] Negative             []  Positive (Details:  )  Sputum Culture:               [] None drawn       [] Negative             []  Positive (Details:  )   Endotracheal aspirate:     [] None drawn       [] Negative             []  Positive (Details:  )     Other Pertinent Labs and Pathology Results:   1. Radiology/Imaging:     XR CHEST PORTABLE   Final Result   Patchy bilateral infiltrates               XR CHEST PORTABLE   Final Result   Stable abnormal chest with the diffuse perihilar and bilateral   infiltrates and small pleural effusions which may be due to diffuse   pneumonia or edema. Endotracheal tube close to nesha and needs to be retracted by 2 cm. ALERT:  THIS IS AN ABNORMAL REPORT            US RETROPERITONEAL COMPLETE   Final Result   Normal bilateral renal ultrasound               XR ABDOMEN FOR NG/OG/NE TUBE PLACEMENT   Final Result   The tip of the tube is at the expected level of the gastric fundus. XR CHEST PORTABLE   Final Result   Cardiomegaly   Tortuous aorta    There are patchy infiltrates seen throughout both the lung fields. Pneumonia could give this appearance. Underlying edema could also have   this appearance   The chest appears to be worse in the interval                  XR CHEST PORTABLE   Final Result   No significant acute process               CT HEAD WO CONTRAST   Final Result         1. No acute intracranial abnormality. 2. Chronic infarction with encephalomalacia in the left frontal lobe,   insula and the lateral aspect of the basal ganglia. CT CERVICAL SPINE WO CONTRAST   Final Result   1. No fracture or joint dislocation is seen in the cervical spine.    2. Minimal anterior wedging of the T1 vertebral body indicates an age-indeterminate fracture. If indicated, MRI may be obtained for   further evaluation. CT CHEST WO CONTRAST   Final Result    1. Minimal age-indeterminate compression fracture deformities of the   T1 and T9 vertebral bodies. If indicated, MRI may be obtained for   further evaluation. 2. No other traumatic abnormality is seen. 3. Mild cardiomegaly with evidence of mild pulmonary arterial   hypertension. CT ABDOMEN PELVIS WO CONTRAST Additional Contrast? None   Final Result   Findings suggestive of cirrhosis with splenomegaly. No acute   abnormality in the abdomen or pelvis. XR CHEST PORTABLE   Final Result   Streaky opacities at the lung bases are nonspecific and may represent   atelectasis, scarring or an acute infectious/inflammatory process. XR CHEST PORTABLE    (Results Pending)             Assessment:     Active Problems:    Sepsis [facility])    Encounter regarding vascular access for dialysis for ESRD [facility])  Resolved Problems:    * No resolved hospital problems. *      Additional assessment:    1. Sepsis with septic shock, urinary source likely  2. GILDA  3. Metabolic acidosis: Renal and lactic  4. Thrombocytopenia: Ongoing  5. Evidence of peripheral vasoconstriction        Plan:     Wean per protocol:  [x] No   [] Yes  [] N/A    ICU Prophylaxis:  Stress ulcer:  [x] PPI Agent  [] [id number] [] Sucralfate  [] Other:  VTE:   [] Enoxaparin, contraindicated  [] Unfract. Heparin Subcut  [] EPC Cuffs    Nutrition:  [] NPO [] Tube Feeding (Specify: ) [] TPN  [x] PO (Diet: DIET TUBE FEED CONTINUOUS/CYCLIC NPO; Semi-elemental; Nasogastric; Continuous; 20; 45; 24)    Home medications reconciled: [] No  [x] Yes    Insulin drip:   [x] No   [] Yes    Family updated:    [] No   [x] Yes    Transfer Candidate?:   [x] No   [] Yes    Additional plans:  1. Continue volume infusion: Bicarb switched to saline per nephrology  2. Antibiotics per ID  3.  No indication for supplemental steroids given cortisol level of 89  4. Dialysis per renal  5. No wean from mechanical ventilation given ongoing acidemia  6. Transfuse platelets as needed  7. Try vasopressin in an effort to reduce phenylephrine, Levophed precipitated significant cardiac dysrhythmias however may need to be tried later  8. Tube feedings          Chart review/lab review/X-ray viewing/documentation: 20 minutes  Assessment: 10 minutes  Conversation patient/family re: prognosis, care options and any end of life issues: 0 minutes  Conversation with staff: 20 minutes  Total critical care time exclusive of procedures: 50 minutes    If the patient is a COVID 19 isolation patient, the above physical exam reflects that of the examining physician for the day. Zacarias Osman M.D. Yue CHRISTIE

## 2020-08-12 NOTE — PLAN OF CARE
Problem: Falls - Risk of:  Goal: Will remain free from falls  Description: Will remain free from falls  Outcome: Met This Shift  Goal: Absence of physical injury  Description: Absence of physical injury  Outcome: Met This Shift     Problem: Skin Integrity:  Goal: Will show no infection signs and symptoms  Description: Will show no infection signs and symptoms  Outcome: Met This Shift  Goal: Absence of new skin breakdown  Description: Absence of new skin breakdown  Outcome: Met This Shift     Problem: Confusion - Acute:  Goal: Absence of continued neurological deterioration signs and symptoms  Description: Absence of continued neurological deterioration signs and symptoms  Outcome: Met This Shift  Goal: Mental status will be restored to baseline  Description: Mental status will be restored to baseline  Outcome: Not Met This Shift     Problem: Discharge Planning:  Goal: Ability to perform activities of daily living will improve  Description: Ability to perform activities of daily living will improve  Outcome: Not Met This Shift  Goal: Participates in care planning  Description: Participates in care planning  Outcome: Not Met This Shift     Problem: Injury - Risk of, Physical Injury:  Goal: Will remain free from falls  Description: Will remain free from falls  Outcome: Met This Shift  Goal: Absence of physical injury  Description: Absence of physical injury  Outcome: Met This Shift     Problem: Mood - Altered:  Goal: Mood stable  Description: Mood stable  Outcome: Met This Shift  Goal: Absence of abusive behavior  Description: Absence of abusive behavior  Outcome: Met This Shift  Goal: Verbalizations of feeling emotionally comfortable while being cared for will increase  Description: Verbalizations of feeling emotionally comfortable while being cared for will increase  Outcome: Not Met This Shift     Problem: Psychomotor Activity - Altered:  Goal: Absence of psychomotor disturbance signs and symptoms  Description: Absence of psychomotor disturbance signs and symptoms  Outcome: Ongoing     Problem: Sensory Perception - Impaired:  Goal: Demonstrations of improved sensory functioning will increase  Description: Demonstrations of improved sensory functioning will increase  Outcome: Ongoing  Goal: Decrease in sensory misperception frequency  Description: Decrease in sensory misperception frequency  Outcome: Ongoing  Goal: Able to refrain from responding to false sensory perceptions  Description: Able to refrain from responding to false sensory perceptions  Outcome: Ongoing  Goal: Demonstrates accurate environmental perceptions  Description: Demonstrates accurate environmental perceptions  Outcome: Ongoing  Goal: Able to distinguish between reality-based and nonreality-based thinking  Description: Able to distinguish between reality-based and nonreality-based thinking  Outcome: Ongoing  Goal: Able to interrupt nonreality-based thinking  Description: Able to interrupt nonreality-based thinking  Outcome: Ongoing     Problem: Sleep Pattern Disturbance:  Goal: Appears well-rested  Description: Appears well-rested  Outcome: Met This Shift     Problem: Infection, Septic Shock:  Goal: Will show no infection signs and symptoms  Description: Will show no infection signs and symptoms  Outcome: Met This Shift     Problem: Restraint Use - Nonviolent/Non-Self-Destructive Behavior:  Goal: Absence of restraint indications  Description: Absence of restraint indications  Outcome: Met This Shift  Goal: Absence of restraint-related injury  Description: Absence of restraint-related injury  Outcome: Met This Shift

## 2020-08-12 NOTE — PROGRESS NOTES
Department of Internal Medicine  Nephrology Attending Progress Note      Reason for Consult: GILDA  Requesting Physician:  Dr Juaquin Art:  Altered mental state    History Obtained From:  spouse, electronic medical record, reason patient could not give history:  altered mental status    Sub:  Patient seen and examined bedside in the ICU, events over the last 24 hours reviewed with the ICU RN. She remains anuric with only 35 mL of urine output in the last 24 hours. She is on pressor support with Jose-Synephrine  Also getting a 1 L bolus of sodium bicarbonate infusion  She is started on tube feeds and free water flushes  .   Repeat labs followed from last night, I gave her additional IV phosphorus, potassium and calcium gluconate last night    Past Medical History:        Diagnosis Date    Cerebral artery occlusion with cerebral infarction [facility]) 04/1989    Encounter regarding vascular access for dialysis for ESRD ([facility] [address] 8/10/2020    Hx of blood clots     Hypertension     Seizures ([facility] [address]      Past Surgical History:        Procedure Laterality Date    COLONOSCOPY  2019    ENDOSCOPY, COLON, DIAGNOSTIC      2019    SKIN GRAFT Right     R foot April 2020     Current Medications:    Current Facility-Administered Medications: albumin human 25 % IV solution 25 g, 25 g, Intravenous, See Admin Instructions  0.9 % sodium chloride bolus, 250 mL, Intravenous, Once  insulin lispro (HUMALOG) injection vial 0-12 Units, 0-12 Units, Subcutaneous, Q6H  albumin human 25 % IV solution 25 g, 25 g, Intravenous, Q6H  [COMPLETED] sodium bicarbonate 150 mEq in dextrose 5 % 1,000 mL infusion, , Intravenous, Once **AND** sodium bicarbonate 150 mEq in dextrose 5 % 1,000 mL infusion, , Intravenous, Continuous  norepinephrine (LEVOPHED) 16 mg in dextrose 5 % 250 mL infusion, 2 mcg/min, Intravenous, Continuous  glucose (GLUTOSE) 40 % oral gel 15 g, 15 g, Oral, PRN  glucagon (rDNA) injection 1 mg, 1 mg, Intramuscular, Daily  LORazepam (ATIVAN) tablet 1 mg, 1 mg, Oral, Q1H PRN **OR** LORazepam (ATIVAN) injection 1 mg, 1 mg, Intravenous, Q1H PRN **OR** LORazepam (ATIVAN) tablet 2 mg, 2 mg, Oral, Q1H PRN **OR** LORazepam (ATIVAN) injection 2 mg, 2 mg, Intravenous, Q1H PRN **OR** LORazepam (ATIVAN) tablet 3 mg, 3 mg, Oral, Q1H PRN **OR** LORazepam (ATIVAN) injection 3 mg, 3 mg, Intravenous, Q1H PRN **OR** LORazepam (ATIVAN) tablet 4 mg, 4 mg, Oral, Q1H PRN **OR** LORazepam (ATIVAN) injection 4 mg, 4 mg, Intravenous, Q1H PRN  nicotine (NICODERM CQ) 21 MG/24HR 1 patch, 1 patch, Transdermal, Daily  phenytoin (DILANTIN) 100 mg in sodium chloride 0.9 % 100 mL IVPB (maintenance dose), 100 mg, Intravenous, Q12H  Allergies:  Patient has no known allergies. Social History:    TOBACCO:  Never used tobacco  ETOH:  Alcoholism present   DRUGS:  Never used recreational drugs    Family History:       Problem Relation Age of Onset    High Blood Pressure Mother     Diabetes Father     High Blood Pressure Brother     Alcohol Abuse Brother     Substance Abuse Brother      REVIEW OF SYSTEMS:    Review of systems not obtained due to patient factors - mental status  PHYSICAL EXAM:      Vitals:    VITALS:  BP (!) 108/58   Pulse 110   Temp 98 °F (36.7 °C)   Resp (!) 31   Ht 5' 2\" (1.575 m)   Wt 165 lb 12.6 oz (75.2 kg)   SpO2 100%   BMI 30.32 kg/m²   24HR INTAKE/OUTPUT:      Intake/Output Summary (Last 24 hours) at 8/12/2020 1121  Last data filed at 8/12/2020 0644  Gross per 24 hour   Intake 7232.2 ml   Output 75 ml   Net 7157.2 ml     Patient seen and examined bedside in the ICU, she is intubated and sedated, her FiO2 requirement is at 40%.   Constitutional: Sedated, no acute distress  HEENT:  NC/AT, PERRL, pale, no JVD  Respiratory:  Decreased breath sounds at bases, coarse breath sounds  Cardiovascular/Edema: Regular rate and rhythm, no murmurs, no uremic pericardial rub on exam  Gastrointestinal:  Soft, bowel sounds decreased, splenomegaly present, Bliss catheter is in place with less than 50 mL of urine output  Neurologic: Sedated  Skin:  Decreased trugor  Other:  +1 edema in both upper and lower extremities unchanged from before, bluish discoloration noted in her toes on both lower extremities, edema is unchanged  DATA:    CBC:   Lab Results   Component Value Date    WBC 14.5 08/12/2020    RBC 2.42 08/12/2020    HGB 8.1 08/12/2020    HCT 22.9 08/12/2020    MCV 94.6 08/12/2020    MCH 33.5 08/12/2020    MCHC 35.4 08/12/2020    RDW 15.7 08/12/2020    PLT 10 08/12/2020    MPV 12.3 08/12/2020     CMP:    Lab Results   Component Value Date     08/12/2020    K 3.4 08/12/2020    K 6.4 08/10/2020    CL 96 08/12/2020    CO2 16 08/12/2020    BUN 22 08/12/2020    CREATININE 1.7 08/12/2020    GFRAA 37 08/12/2020    LABGLOM 30 08/12/2020    GLUCOSE 222 08/12/2020    PROT 5.2 08/12/2020    LABALBU 1.6 08/12/2020    CALCIUM 6.1 08/12/2020    BILITOT 0.8 08/12/2020    ALKPHOS 212 08/12/2020     08/12/2020    ALT 57 08/12/2020     Magnesium:    Lab Results   Component Value Date    MG 1.6 08/12/2020     Phosphorus:    Lab Results   Component Value Date    PHOS 2.7 08/12/2020     U/A:    Lab Results   Component Value Date    COLORU DARK YELLOW 08/09/2020    PROTEINU >=300 08/09/2020    PHUR 6.5 08/09/2020    WBCUA PACKED 08/09/2020    RBCUA 5-10 08/09/2020    BACTERIA MANY 08/09/2020    CLARITYU CLOUDY 08/09/2020    SPECGRAV 1.020 08/09/2020    LEUKOCYTESUR MODERATE 08/09/2020    UROBILINOGEN 0.2 08/09/2020    BILIRUBINUR SMALL 08/09/2020    BLOODU LARGE 08/09/2020    GLUCOSEU Negative 08/09/2020     ABG:    Lab Results   Component Value Date    PH 7.366 08/12/2020    PCO2 24.6 08/12/2020    PO2 114.4 08/12/2020    HCO3 13.8 08/12/2020    BE -10.3 08/12/2020    O2SAT 98.5 08/12/2020     Microalbumen/Creatinine ratio:  No components found for: RUCREAT  TSH:    Lab Results   Component Value Date    TSH 3.440 11/12/2016     VITAMIN B12: No components found from the bladder catheter when it was placed. After admission, patient was started on iv fluids and antibiotics for possible sepsis and GILDA with hyponatremia. Her serum creatinine was elevated to 3.7 mg/dl from baseline 0.7 therefore this consultation was requested. This morning patient is hypotensive so RRT was called. 1. GILDA - anuric with septic shock causing severe ischemic ATN- will rule out TTP/HUS in view of splenomegaly, mental status changes and severe thrombocytopenia. - severe hyperkalemia noted- will initiate HD support in view of oligoanuria    2. NAGMA secondary to GILAD  3. Mental status changes with history of CVA- multifactorial ?  4. Anemia/thrombocytopenia - ?hemolytic process - hematology on case  5. Sepsis- on Rocephin, ID consulted- on Rocephin  6. Complicated urinary tract infection  7. Hypokalemia, secondary to poor intake, potassium removal with dialysis  8. Severe hypomagnesemia, nutritional  9. Hypophosphatemia nutritional  10. Hyponatremia from impaired free water excretion as she remains anuric      PLAN:  1. Hemodialysis arranged for today with a 4K bath and 40 bicarbonate, chest x-ray results reviewed consistent with bilateral pulmonary vascular congestion  2. ABG results reviewed, pH is over 7.3 with evidence of respiratory alkalosis and metabolic acidosis, metabolic acidosis is secondary to underlying lactic acidosis from sepsis, stop bicarbonate drip, IV bicarbonate is contributing to further hypokalemia, discussed with the ICU RN  3. Severe thrombocytopenia is likely secondary to sepsis, alcoholism, splenomegaly, will send ADAMTS 13 ( TTP probability is low as there is no evidence of MAHA on peripheral smear)  4. Repeat BMP 3 hours after hemodialysis  5.   Obtain ionized calcium      Shannan Bhat MD

## 2020-08-13 NOTE — PROGRESS NOTES
[facility] Hospital[facility]   Progress Note    Admitting Date and Time: 8/9/2020 12:10 PM  Admit Dx: Sepsis ([facility] [address] [A41.9]  Sepsis ([facility] [address] [A41.9]     Seen for follow on multiple problems as listed below -     Subjective: Intubated on vent , can not provide details , d/w bedside nurse in details. Dtr POA has made decision & she is limited code. ROS: Not possible sec to above.      potassium phosphate IVPB  30 mmol Intravenous Once    furosemide        calcium gluconate IVPB  3 g Intravenous Once    albumin human  25 g Intravenous See Admin Instructions    insulin lispro  0-12 Units Subcutaneous Q6H    piperacillin-tazobactam  3.375 g Intravenous Q12H    pantoprazole  40 mg Intravenous Daily    And    sodium chloride (PF)  10 mL Intravenous Daily    sodium chloride  20 mL Intravenous Once    flumazenil  0.2 mg Intravenous Once    chlorhexidine  15 mL Mouth/Throat BID    sodium chloride  20 mL Intravenous Once    sodium chloride flush  10 mL Intravenous 2 times per day    thiamine  100 mg Intravenous Daily    vitamin B-12  1,000 mcg Oral Daily    folic acid  1 mg Oral Daily    nicotine  1 patch Transdermal Daily    phenytoin (DILANTIN) maintenance dose IVPB  100 mg Intravenous Q12H     glucose, 15 g, PRN  glucagon (rDNA), 1 mg, PRN  dextrose, 100 mL/hr, PRN  perflutren lipid microspheres, 1.5 mL, ONCE PRN  anticoagulant sodium citrate, 4.3 mL, Daily PRN  dextrose, 12.5 g, PRN  sodium chloride flush, 10 mL, PRN  sodium chloride flush, 10 mL, PRN  acetaminophen, 650 mg, Q6H PRN    Or  acetaminophen, 650 mg, Q6H PRN  polyethylene glycol, 17 g, Daily PRN  promethazine, 12.5 mg, Q6H PRN    Or  ondansetron, 4 mg, Q6H PRN  LORazepam, 1 mg, Q1H PRN    Or  LORazepam, 1 mg, Q1H PRN    Or  LORazepam, 2 mg, Q1H PRN    Or  LORazepam, 2 mg, Q1H PRN    Or  LORazepam, 3 mg, Q1H PRN    Or  LORazepam, 3 mg, Q1H PRN    Or  LORazepam, 4 mg, Q1H PRN    Or  LORazepam, 4 mg, Q1H PRN         Objective:    BP (!) 163/74   Pulse 118   Temp 100.3 °F (37.9 °C) (Oral)   Resp (!) 32   Ht 5' 2\" (1.575 m)   Wt 168 lb 6.9 oz (76.4 kg)   SpO2 92%   BMI 30.81 kg/m²   Intubated on vent  HEENT - normocephalic , no icterus  Neck supple ,no masses  Heart RRR S1S2N  Lungs - Bilat coarse BS   Abd - soft NTND   Ext - with bluish /black discoloration   Skin - warm & dry  NE -on vent , responds some what to pain,further details not possible  Psych - not possible. Recent Labs     08/12/20  0500 08/12/20  1634 08/13/20  0600   * 131* 132   K 3.4* 3.3* 3.1*   CL 96* 96* 97*   CO2 16* 24 23   BUN 22 13 19   CREATININE 1.7* 1.2* 1.6*   GLUCOSE 222* 207* 201*   CALCIUM 6.1* 6.2* 7.2*       Recent Labs     08/11/20  0500 08/12/20  0500 08/12/20  1405 08/13/20  0600   WBC 16.5* 14.5*  --  11.0   RBC 2.30* 2.42*  --  2.04*   HGB 7.8* 8.1*  --  6.7*   HCT 21.4* 22.9*  --  19.5*   MCV 93.0 94.6  --  95.6   MCH 33.9 33.5  --  32.8   MCHC 36.4* 35.4*  --  34.4   RDW 14.6 15.7*  --  15.9*   PLT 20* 10* 34* 17*   MPV 12.5* 12.3*  --  NOT CALC       Labs and images reviewed     Radiology:   XR CHEST PORTABLE   Final Result   Tortuous ectatic aorta   Cardiomegaly    Airspace disease, patchy diffuse, bilateral, worse in the interval                     XR CHEST PORTABLE   Final Result   Patchy bilateral infiltrates               XR CHEST PORTABLE   Final Result   Stable abnormal chest with the diffuse perihilar and bilateral   infiltrates and small pleural effusions which may be due to diffuse   pneumonia or edema. Endotracheal tube close to nesha and needs to be retracted by 2 cm. ALERT:  THIS IS AN ABNORMAL REPORT            US RETROPERITONEAL COMPLETE   Final Result   Normal bilateral renal ultrasound               XR ABDOMEN FOR NG/OG/NE TUBE PLACEMENT   Final Result   The tip of the tube is at the expected level of the gastric fundus.             XR CHEST PORTABLE   Final Result   Cardiomegaly   Tortuous aorta    There are patchy infiltrates seen throughout both the lung fields. Pneumonia could give this appearance. Underlying edema could also have   this appearance   The chest appears to be worse in the interval                  XR CHEST PORTABLE   Final Result   No significant acute process               CT HEAD WO CONTRAST   Final Result         1. No acute intracranial abnormality. 2. Chronic infarction with encephalomalacia in the left frontal lobe,   insula and the lateral aspect of the basal ganglia. CT CERVICAL SPINE WO CONTRAST   Final Result   1. No fracture or joint dislocation is seen in the cervical spine. 2. Minimal anterior wedging of the T1 vertebral body indicates an   age-indeterminate fracture. If indicated, MRI may be obtained for   further evaluation. CT CHEST WO CONTRAST   Final Result    1. Minimal age-indeterminate compression fracture deformities of the   T1 and T9 vertebral bodies. If indicated, MRI may be obtained for   further evaluation. 2. No other traumatic abnormality is seen. 3. Mild cardiomegaly with evidence of mild pulmonary arterial   hypertension. CT ABDOMEN PELVIS WO CONTRAST Additional Contrast? None   Final Result   Findings suggestive of cirrhosis with splenomegaly. No acute   abnormality in the abdomen or pelvis. XR CHEST PORTABLE   Final Result   Streaky opacities at the lung bases are nonspecific and may represent   atelectasis, scarring or an acute infectious/inflammatory process. XR CHEST PORTABLE    (Results Pending)   XR CHEST PORTABLE    (Results Pending)       Assessment:    Active Problems:    Sepsis [facility])    Encounter regarding vascular access for dialysis for ESRD [facility])  Resolved Problems:    * No resolved hospital problems. *      Plan:  Sepsis POA sec to Complicated Ecoli uti and bacteremia  / septic shock - she was transferred to ICU following RRT on 8/10/20. Intubated on vent , was placed on levophed followed by ila synephrine. ID and intensivist following. On IV ceftriax as per ID. Currently off of vasopressors    Acute resp failure - on vent support as above , intensivist following. Acute encephalopathy - multifactorial sec to sepsis /Brionna /LA/MA - renal following and is receiving intermittent HD,had temporary HD cath placed on 8/10. Last HD rx 8/12    Br[place] /lactic acidosis/Metabolic acidosis/electrolyte imbalance - likely sec to ATN ,renal following , receiving intermittent HD , monitor. Replete electrolytes as necessary. Thrombocytopenia & Anemia  - d/t splenic sequestration[has splenomegaly as per CT] also sec to cirrhosis, hemonc consulted. suggests transfuse for PLTs < 20 & Hgb < 7 , monitor . Will receive  PRBC today. Seizure - on IV phenytoin , monitor. Chronic ETOH use and cirrhosis - on ciwa scale , monitor. HTN by Hx - Lisinopril on hold sec to hypotension     Multiple falls with age indeterminate compression fxs of the spine     DVT prophy - Lovenox   Full code     As above dtr POA has made decision & she is limited code.           Electronically signed by Anat Pedro MD on 8/13/2020 at 12:47 PM

## 2020-08-13 NOTE — PATIENT CARE CONFERENCE
Intensive Care Daily Quality Rounding Checklist      ICU Team Members: Dr. Aleyda Keating, clinical pharmacist, charge nurse, bedside nurse, and respiratory therapist    ICU Day #: 4    Intubation Date: 8/10/20    Ventilator Day #: 3    Central Line Insertion Date: 8/10/20        Day #: 4     Arterial Line Insertion Date: 8/10/20      Day #: 4    DVT Prophylaxis: N/a    GI Prophylaxis: N/a    Bliss Catheter Insertion Date: 8/9/20       Day #: 5      Continued need (if yes, reason documented and discussed with physician): Yes, strict I&O's in acute renal failure patient    Skin Issues/ Wounds and ordered treatment discussed on rounds: Yes, generalized bruising, deep purple finger, toes and bottoms of feet--physicians aware    Goals/ Plans for the Day: Daily labs, wean vent, maintain BP, Albumin and electrolytes replaced.  HD per nephrology

## 2020-08-13 NOTE — PROGRESS NOTES
[address] [place] [address] [facility]  [facility]  Progress Note      Chief Complaint   Patient presents with    Fatigue     history cva with rt weakness    Fall     multiple falls over last few young. multiple bruises on torso. SUBJECTIVE:  Patient was seen and examined at bedside this morning. Patient is still intubated and sedated. Patient remained afebrile overnight. Neosynephrine has been weaned off        -Blood culture returned positive for pansensitive E. Coli  -Respiratory culture positive for Kleb and rare gram-positive cocci= reduced oral diana        Review of systems:  Unable to obtain as patient is sedated and intubated.     Medications:  Scheduled Meds:   potassium phosphate IVPB  30 mmol Intravenous Once    furosemide        calcium gluconate IVPB  3 g Intravenous Once    albumin human  25 g Intravenous See Admin Instructions    insulin lispro  0-12 Units Subcutaneous Q6H    piperacillin-tazobactam  3.375 g Intravenous Q12H    pantoprazole  40 mg Intravenous Daily    And    sodium chloride (PF)  10 mL Intravenous Daily    sodium chloride  20 mL Intravenous Once    flumazenil  0.2 mg Intravenous Once    chlorhexidine  15 mL Mouth/Throat BID    sodium chloride  20 mL Intravenous Once    sodium chloride flush  10 mL Intravenous 2 times per day    thiamine  100 mg Intravenous Daily    vitamin B-12  1,000 mcg Oral Daily    folic acid  1 mg Oral Daily    nicotine  1 patch Transdermal Daily    phenytoin (DILANTIN) maintenance dose IVPB  100 mg Intravenous Q12H     Continuous Infusions:   dextrose      sodium chloride Stopped (08/13/20 0900)    sodium chloride 12.5 mL/hr at 08/13/20 0400    midazolam 7 mg/hr (08/13/20 0800)     PRN Meds:glucose, glucagon (rDNA), dextrose, perflutren lipid microspheres, anticoagulant sodium citrate, dextrose, sodium chloride flush, sodium chloride flush, acetaminophen **OR** acetaminophen, polyethylene glycol, promethazine **OR** ondansetron, LORazepam **OR** LORazepam **OR** LORazepam **OR** LORazepam **OR** LORazepam **OR** LORazepam **OR** LORazepam **OR** LORazepam    OBJECTIVE:  BP (!) 163/74   Pulse 118   Temp 100.3 °F (37.9 °C) (Oral)   Resp (!) 32   Ht 5' 2\" (1.575 m)   Wt 168 lb 6.9 oz (76.4 kg)   SpO2 92%   BMI 30.81 kg/m²   Temp  Av.7 °F (37.1 °C)  Min: 97.6 °F (36.4 °C)  Max: 100.3 °F (37.9 °C)    Constitutional: The patient is intubated and sedated. 40% peep of 5  Skin: Warm and dry. No rashes were noted. No jaundice. Cyanosis in fingers and toes in all 4 extremities  HEENT: Eyes show round, and reactive pupils. Moist mucous membranes, no ulcerations, no thrush. Neck: Supple to movements. No lymphadenopathy. Chest: Intubated. No secretions suctioned from ET tube. Rhonchus bilaterally  Cardiovascular: S1 and S2 are rhythmic and regular. No murmurs appreciated. Abdomen: Started on TF; Positive bowel sounds to auscultation. Benign to palpation. No masses felt. No hepatosplenomegaly. Genitourinary: Bliss in place but no urine output  Extremities: No clubbing, no edema; cyanosis in all fingers and toes in all 4 extremities  Musculoskeletal: Equal and symmetrical  Neurological: sedated; withdraws to pain while off sedation  Lines: 2 peripheral IVs. Arterial line - left femoral (8/10).  Temporary HD catheter - right femoral (8/10)        Laboratory and Tests Review:  Lab Results   Component Value Date    WBC 11.0 2020    WBC 14.5 (H) 2020    WBC 16.5 (H) 2020    HGB 6.7 (L) 2020    HCT 19.5 (L) 2020    MCV 95.6 2020    PLT 17 (LL) 2020     Lab Results   Component Value Date    NEUTROABS 10.23 (H) 2020    NEUTROABS 13.49 (H) 2020    NEUTROABS 13.26 (H) 2020     No results found for: [facility]  Lab Results   Component Value Date    ALT 32 2020    AST 53 (H) 2020    ALKPHOS 272 (H) 2020    BILITOT 2.2 (H) 2020     Lab Results   Component Value Date  08/13/2020    K 3.1 08/13/2020    K 6.4 08/10/2020    CL 97 08/13/2020    CO2 23 08/13/2020    BUN 19 08/13/2020    CREATININE 1.6 08/13/2020    CREATININE 1.2 08/12/2020    CREATININE 1.7 08/12/2020    GFRAA 39 08/13/2020    LABGLOM 33 08/13/2020    GLUCOSE 201 08/13/2020    PROT 6.2 08/13/2020    LABALBU 3.2 08/13/2020    CALCIUM 7.2 08/13/2020    BILITOT 2.2 08/13/2020    ALKPHOS 272 08/13/2020    AST 53 08/13/2020    ALT 32 08/13/2020     Lab Results   Component Value Date    CRP 26.8 (H) 08/10/2020     Lab Results   Component Value Date    SEDRATE 79 (H) 08/10/2020     Radiology:      Microbiology:   Lab Results   Component Value Date    ORG Klebsiella pneumoniae ssp pneumoniae 08/10/2020    ORG Escherichia coli 08/09/2020    ORG Escherichia coli 08/09/2020     Lab Results   Component Value Date    ORG Klebsiella pneumoniae ssp pneumoniae 08/10/2020    ORG Escherichia coli 08/09/2020    ORG Escherichia coli 08/09/2020     No results found for: WNDABS  Smear, Respiratory   Date Value Ref Range Status   08/10/2020   Final    Group 6: <25 PMN's/LPF and <25 Epithelial cells/LPF  Rare Polymorphonuclear leukocytes  Rare Epithelial cells  Rare Gram positive cocci       No results found for: MPNEUMO, CLAMYDCU, LABLEGI, AFBCX, FUNGSM, LABFUNG  CULTURE, RESPIRATORY   Date Value Ref Range Status   08/10/2020 Oral Pharyngeal Dede reduced (A)  Final   08/10/2020 Light growth  Final     No results found for: CXCATHTIP  No results found for: BFCS  No results found for: CXSURG  Urine Culture, Routine   Date Value Ref Range Status   08/09/2020 >100,000 CFU/ml  Final     MRSA Culture Only   Date Value Ref Range Status   08/10/2020 Methicillin resistant Staph aureus not isolated  Final   10/27/2016 Methicillin resistant Staph aureus not isolated  Final       ASSESSMENT:  · Septic shock 2/2 E.coli bacteremia due to urosepsis  · E.coli bacteremia  · Complicated UTI - urine culture positive for E.coli (pan-sensitive)  · Respiratory culture positive Klebsiella and normal oral diana  · Leukocytosis -normalized  · HAGMA 2/2 Uremia and lactic acidosis   · Uremia - improved after dialysis  · Lactic acidosis - improving   · Acute respiratory failure. Currently intubated  · Anuric GILDA - currently on dialysis   · Thrombocytopenia likely 2/2 sepsis and chronic alcohol use, overwhelming sepsis and possibly early DIC  · Alcohol use disorder with liver cirrhosis    PLAN:  · Urine and blood culture positive for E.coli (pansensitive)  · Respiratory culture positive for gram-negative rods  · Currently on Zosyn but can return to ceftriaxone 1 g every 24 hours.    · Monitor labs    Chico Perdue MD  12:55 PM  8/13/2020

## 2020-08-13 NOTE — PROGRESS NOTES
Department of Internal Medicine  Nephrology Attending Progress Note      Reason for Consult: GILDA  Requesting Physician:  Dr Oumar Gillette:  Altered mental state    History Obtained From:  spouse, electronic medical record, reason patient could not give history:  altered mental status    Sub:  Patient seen and examined bedside in the ICU, events over the last 24 hours reviewed with the ICU RN. She remains anuric with only 35 mL of urine output in the last 24 hours.   Off of pressor support, BP is high    Past Medical History:        Diagnosis Date    Cerebral artery occlusion with cerebral infarction [facility]) 04/1989    Encounter regarding vascular access for dialysis for ESRD ([facility] [address] 8/10/2020    Hx of blood clots     Hypertension     Seizures ([facility] [address]      Past Surgical History:        Procedure Laterality Date    COLONOSCOPY  2019    ENDOSCOPY, COLON, DIAGNOSTIC      2019    SKIN GRAFT Right     R foot April 2020     Current Medications:    Current Facility-Administered Medications: furosemide (LASIX) 10 MG/ML injection, , ,   cefTRIAXone (ROCEPHIN) 1 g in sterile water 10 mL IV syringe, 1 g, Intravenous, Q24H  albumin human 25 % IV solution 25 g, 25 g, Intravenous, See Admin Instructions  insulin lispro (HUMALOG) injection vial 0-12 Units, 0-12 Units, Subcutaneous, Q6H  glucose (GLUTOSE) 40 % oral gel 15 g, 15 g, Oral, PRN  glucagon (rDNA) injection 1 mg, 1 mg, Intramuscular, PRN  dextrose 5 % solution, 100 mL/hr, Intravenous, PRN  0.9 % sodium chloride infusion, , Intravenous, Continuous  pantoprazole (PROTONIX) injection 40 mg, 40 mg, Intravenous, Daily **AND** sodium chloride (PF) 0.9 % injection 10 mL, 10 mL, Intravenous, Daily  perflutren lipid microspheres (DEFINITY) injection 1.65 mg, 1.5 mL, Intravenous, ONCE PRN  0.9 % sodium chloride bolus, 20 mL, Intravenous, Once  flumazenil (ROMAZICON) injection 0.2 mg, 0.2 mg, Intravenous, Once  chlorhexidine (PERIDEX) 0.12 % solution 15 mL, 15 mL, Onset    High Blood Pressure Mother     Diabetes Father     High Blood Pressure Brother     Alcohol Abuse Brother     Substance Abuse Brother      REVIEW OF SYSTEMS:    Review of systems not obtained due to patient factors - mental status  PHYSICAL EXAM:      Vitals:    VITALS:  /66   Pulse 111   Temp 98.4 °F (36.9 °C) (Oral)   Resp (!) 35   Ht 5' 2\" (1.575 m)   Wt 168 lb 6.9 oz (76.4 kg)   SpO2 97%   BMI 30.81 kg/m²   24HR INTAKE/OUTPUT:      Intake/Output Summary (Last 24 hours) at 8/13/2020 1707  Last data filed at 8/13/2020 1404  Gross per 24 hour   Intake 7152 ml   Output 20 ml   Net 7132 ml     Patient seen and examined bedside in the ICU, she is intubated and sedated, her FiO2 requirement is at 40%.   Constitutional: Sedated, no acute distress  HEENT:  NC/AT, PERRL, pale, no JVD  Respiratory:  Decreased breath sounds at bases, coarse breath sounds  Cardiovascular/Edema: Regular rate and rhythm, no murmurs, no uremic pericardial rub on exam  Gastrointestinal:  Soft, bowel sounds decreased, splenomegaly present, Bliss catheter is in place with less than 50 mL of urine output  Neurologic: Sedated  Skin:  Decreased trugor  Other:  +1 edema in both upper and lower extremities unchanged from before, bluish discoloration noted in her toes on both lower extremities, edema is unchanged  DATA:    CBC:   Lab Results   Component Value Date    WBC 11.0 08/13/2020    RBC 2.04 08/13/2020    HGB 6.7 08/13/2020    HCT 19.5 08/13/2020    MCV 95.6 08/13/2020    MCH 32.8 08/13/2020    MCHC 34.4 08/13/2020    RDW 15.9 08/13/2020    PLT 17 08/13/2020    MPV NOT CALC 08/13/2020     CMP:    Lab Results   Component Value Date     08/13/2020    K 3.1 08/13/2020    K 6.4 08/10/2020    CL 97 08/13/2020    CO2 23 08/13/2020    BUN 19 08/13/2020    CREATININE 1.6 08/13/2020    GFRAA 39 08/13/2020    LABGLOM 33 08/13/2020    GLUCOSE 201 08/13/2020    PROT 6.2 08/13/2020    LABALBU 3.2 08/13/2020    CALCIUM 7.2 08/13/2020    BILITOT 2.2 08/13/2020    ALKPHOS 272 08/13/2020    AST 53 08/13/2020    ALT 32 08/13/2020     Magnesium:    Lab Results   Component Value Date    MG 1.7 08/13/2020     Phosphorus:    Lab Results   Component Value Date    PHOS 1.1 08/13/2020     U/A:    Lab Results   Component Value Date    COLORU DARK YELLOW 08/09/2020    PROTEINU >=300 08/09/2020    PHUR 6.5 08/09/2020    WBCUA PACKED 08/09/2020    RBCUA 5-10 08/09/2020    BACTERIA MANY 08/09/2020    CLARITYU CLOUDY 08/09/2020    SPECGRAV 1.020 08/09/2020    LEUKOCYTESUR MODERATE 08/09/2020    UROBILINOGEN 0.2 08/09/2020    BILIRUBINUR SMALL 08/09/2020    BLOODU LARGE 08/09/2020    GLUCOSEU Negative 08/09/2020     ABG:    Lab Results   Component Value Date    PH 7.461 08/13/2020    PCO2 28.2 08/13/2020    PO2 91.7 08/13/2020    HCO3 19.7 08/13/2020    BE -3.6 08/13/2020    O2SAT 97.7 08/13/2020     Microalbumen/Creatinine ratio:  No components found for: RUCREAT  TSH:    Lab Results   Component Value Date    TSH 3.440 11/12/2016     VITAMIN B12: No components found for: B12  FOLATE:    Lab Results   Component Value Date    FOLATE 10.5 08/09/2020     IRON:    Lab Results   Component Value Date    IRON 16 08/09/2020     Iron Saturation:  No components found for: PERCENTFE  TIBC:    Lab Results   Component Value Date    TIBC 111 08/09/2020     FERRITIN:    Lab Results   Component Value Date    FERRITIN 914 08/09/2020     MANDO:  No results found for: ANATITER, MANDO  AMYLASE:  No results found for: AMYLASE  LIPASE:    Lab Results   Component Value Date    LIPASE 13 08/09/2020   Results for Oscar Floyd (MRN [id number]) as of 8/12/2020 11:30   Ref. Range 8/12/2020 00:00 8/12/2020 05:00   Lactic Acid Latest Ref Range: 0.5 - 2.2 mmol/L 2.4 (H) 2.4 (H)   Results for Oscar Ohms (MRN [id number]) as of 8/12/2020 11:30   Ref.  Range 8/12/2020 09:39   Fibrinogen Latest Ref Range: 225 - 540 mg/dL 636 (H)   aPTT Latest Ref Range: 24.5 - 35.1 sec 25.5   D-Dimer,

## 2020-08-13 NOTE — PLAN OF CARE
Problem: Injury - Risk of, Physical Injury:  Goal: Will remain free from falls  Description: Will remain free from falls  8/13/2020 0108 by Keyanna Fields RN  Outcome: Met This Shift     Problem: Confusion - Acute:  Goal: Absence of continued neurological deterioration signs and symptoms  Description: Absence of continued neurological deterioration signs and symptoms  8/13/2020 0108 by Keyanna Fields RN  Outcome: Met This Shift     Problem: Skin Integrity:  Goal: Absence of new skin breakdown  Description: Absence of new skin breakdown  8/13/2020 0108 by Keyanna Fields RN  Outcome: Met This Shift     Problem: Skin Integrity:  Goal: Will show no infection signs and symptoms  Description: Will show no infection signs and symptoms  8/13/2020 0108 by Keyanna Fields RN  Outcome: Met This Shift     Problem: Falls - Risk of:  Goal: Will remain free from falls  Description: Will remain free from falls  8/13/2020 0108 by Keyanna Fields RN  Outcome: Met This Shift     Problem: Injury - Risk of, Physical Injury:  Goal: Absence of physical injury  Description: Absence of physical injury  8/13/2020 0108 by Keyanna Fields RN  Outcome: Met This Shift     Problem: Psychomotor Activity - Altered:  Goal: Absence of psychomotor disturbance signs and symptoms  Description: Absence of psychomotor disturbance signs and symptoms  8/13/2020 0108 by Keyanna Fields RN  Outcome: Met This Shift     Problem: Infection, Septic Shock:  Goal: Will show no infection signs and symptoms  Description: Will show no infection signs and symptoms  8/13/2020 0108 by Keyanna Fields RN  Outcome: Met This Shift     Problem: Restraint Use - Nonviolent/Non-Self-Destructive Behavior:  Goal: Absence of restraint-related injury  Description: Absence of restraint-related injury  8/13/2020 0108 by Keyanna Fields RN  Outcome: Met This Shift     Problem: Confusion - Acute:  Goal: Mental status will be restored to baseline  Description: Mental status will be restored to baseline  8/13/2020 0108 by Genesis Mondragon RN  Outcome: Not Met This Shift  Note: Continued need for sedation     Problem: Discharge Planning:  Goal: Ability to perform activities of daily living will improve  Description: Ability to perform activities of daily living will improve  8/13/2020 0108 by Genesis Mondragon RN  Outcome: Not Met This Shift  Note: Patient sedated     Problem: Discharge Planning:  Goal: Participates in care planning  Description: Participates in care planning  8/13/2020 0108 by Genesis Mondragon RN  Outcome: Not Met This Shift  Note: Patient sedated

## 2020-08-13 NOTE — PROGRESS NOTES
Critical Care Team: Daily Progress Note         Date and time: 8/13/2020 8:43 AM    Patient's name:  Deirdre Zabala    Medical Record Number: [id number]    Patient's account/billing number: [de-identified]    Patient's YOB: 1955    Age: 63 y.o. Date of Admission: 8/9/2020 12:10 PM    Length of stay during current admission: 4    Primary Care Physician: No primary care provider on file. Previous Pulmonary: None    Code Status: Full Code    PMH:    Past Medical History:   Diagnosis Date    Cerebral artery occlusion with cerebral infarction [facility]) 04/1989    Encounter regarding vascular access for dialysis for ESRD ([facility] [address] 8/10/2020    Hx of blood clots     Hypertension     Seizures ([facility] [address]        PSH:   Past Surgical History:   Procedure Laterality Date    COLONOSCOPY  2019    ENDOSCOPY, COLON, DIAGNOSTIC      2019    SKIN GRAFT Right     R foot April 2020       Reason for ICU admission:   1. Sepsis with septic shock, gram-negative. 2. Renal failure  3. Metabolic acidosis  4. Profound thrombocytopenia      Subjective:     Events in the past 24 Hours:   1. Finally off pressors. 2. Volume resuscitation resulting in significant anemia  3. For transfusion  4. Off dialysis today  5. Respiratory status remains poor. Ventilator was adjusted for the respiratory alkalosis which is likely secondary to the anemia. Peak airway pressures and plateau pressures remain high.       Current ventilation:     [x] Ventilator  [] BIPAP/AVAPS  [] Nasal Cannula [] Room Air      Secretions      Amount:  [] Small [] Moderate  _ Large  [x] None  Color:     - White - Colored  - Bloody    Sedation:  RAAS Score:  [] Propofol gtt  [] Fentanyl gtt  [] Ativan gtt   [x] No Sedation    Paralyzed?:  [x] No    [] Yes      Vasopressors:  [] No    [x] Yes    If yes -   [] Levophed       [] Dopamine     [] Vasopressin       [] Dobutamine  [x] Phenylephrine         [] Epinephrine    Central line:     [] No   [x] Yes response.     Medications:    Scheduled Meds:   potassium phosphate IVPB  30 mmol Intravenous Once    potassium chloride  20 mEq Intravenous Once    albumin human  25 g Intravenous See Admin Instructions    sodium chloride  250 mL Intravenous Once    insulin lispro  0-12 Units Subcutaneous Q6H    piperacillin-tazobactam  3.375 g Intravenous Q12H    pantoprazole  40 mg Intravenous Daily    And    sodium chloride (PF)  10 mL Intravenous Daily    sodium chloride  20 mL Intravenous Once    flumazenil  0.2 mg Intravenous Once    chlorhexidine  15 mL Mouth/Throat BID    sodium chloride flush  10 mL Intravenous 2 times per day    sodium chloride  20 mL Intravenous Once    sodium chloride flush  10 mL Intravenous 2 times per day    thiamine  100 mg Intravenous Daily    vitamin B-12  1,000 mcg Oral Daily    folic acid  1 mg Oral Daily    nicotine  1 patch Transdermal Daily    phenytoin (DILANTIN) maintenance dose IVPB  100 mg Intravenous Q12H     Continuous Infusions:   norepinephrine      dextrose      vasopressin (Septic Shock) infusion Stopped (08/12/20 1830)    sodium chloride 75 mL/hr at 08/12/20 1130    sodium chloride 12.5 mL/hr at 08/13/20 0400    phenylephrine (MICHA-SYNEPHRINE) 50mg/250mL infusion Stopped (08/12/20 1515)    midazolam 10 mg/hr (08/13/20 0430)     PRN Meds:   glucose, 15 g, PRN  glucagon (rDNA), 1 mg, PRN  dextrose, 100 mL/hr, PRN  perflutren lipid microspheres, 1.5 mL, ONCE PRN  anticoagulant sodium citrate, 4.3 mL, Daily PRN  dextrose, 12.5 g, PRN  sodium chloride flush, 10 mL, PRN  sodium chloride flush, 10 mL, PRN  acetaminophen, 650 mg, Q6H PRN    Or  acetaminophen, 650 mg, Q6H PRN  polyethylene glycol, 17 g, Daily PRN  promethazine, 12.5 mg, Q6H PRN    Or  ondansetron, 4 mg, Q6H PRN  LORazepam, 1 mg, Q1H PRN    Or  LORazepam, 1 mg, Q1H PRN    Or  LORazepam, 2 mg, Q1H PRN    Or  LORazepam, 2 mg, Q1H PRN    Or  LORazepam, 3 mg, Q1H PRN    Or  LORazepam, 3 mg, Q1H PRN Or  LORazepam, 4 mg, Q1H PRN    Or  LORazepam, 4 mg, Q1H PRN          Vent Settings (Comprehensive) (if applicable):  Vent Information  $Ventilation: $Subsequent Day  Equipment ID: [id number]  Vent Type: [phone]  Vent Mode: AC/VC  Vt Ordered: 400 mL  Rate Set: 12 bmp  Peak Flow: 40 L/min  Pressure Support: 0 cmH20  FiO2 : 40 %  SpO2: 98 %  SpO2/FiO2 ratio: 245  Sensitivity: 3  PEEP/CPAP: 5  I Time/ I Time %: 0 s  Humidification Source: HME  Additional Respiratory  Assessments  Pulse: 120  Resp: 19  SpO2: 98 %  Position: Semi-Hahn's  Humidification Source: HME  Oral Care: Mouth swabbed, Mouth suctioned, Suction toothette, Mouth moisturizer  Subglottic Suction Done?: Yes    ABGs:   Recent Labs     08/13/20  0612   PH 7.461*   PCO2 28.2*   PO2 91.7   HCO3 19.7*   BE -3.6*   O2SAT 97.7       Laboratory findings:    Complete Blood Count:   Recent Labs     08/11/20  0500 08/12/20  0500 08/12/20  1405 08/13/20  0600   WBC 16.5* 14.5*  --  11.0   HGB 7.8* 8.1*  --  6.7*   HCT 21.4* 22.9*  --  19.5*   PLT 20* 10* 34* 17*        Last 3 Blood Glucose:   Recent Labs     08/12/20  0500 08/12/20  1634 08/13/20  0600   GLUCOSE 222* 207* 201*        PT/INR:    Lab Results   Component Value Date    PROTIME 12.4 08/12/2020    INR 1.1 08/12/2020     PTT:    Lab Results   Component Value Date    APTT 25.5 08/12/2020       Comprehensive Metabolic Profile:   Recent Labs     08/12/20  0500 08/12/20  1634 08/13/20  0600   * 131* 132   K 3.4* 3.3* 3.1*   CL 96* 96* 97*   CO2 16* 24 23   BUN 22 13 19   CREATININE 1.7* 1.2* 1.6*   GLUCOSE 222* 207* 201*   CALCIUM 6.1* 6.2* 7.2*   PROT 5.2*  --  6.2*   LABALBU 1.6*  --  3.2*   BILITOT 0.8  --  2.2*   ALKPHOS 212*  --  272*   *  --  53*   ALT 57*  --  32      Magnesium:   Lab Results   Component Value Date    MG 1.7 08/13/2020     Phosphorus:   Lab Results   Component Value Date    PHOS 1.1 08/13/2020     Ionized Calcium:   Lab Results   Component Value Date    CAION 0.90 08/12/2020 Urinalysis:     Troponin: No results for input(s): TROPONINI in the last 72 hours. Microbiology past 24h:     Blood cultures:                 [] None drawn      [] Negative             []  Positive (Details: Pansensitive E. coli.)  Urine Culture:                   [] None drawn      [] Negative             []  Positive (Details:  )  Sputum Culture:               [] None drawn       [] Negative             []  Positive (Details:  )   Endotracheal aspirate:     [] None drawn       [] Negative             []  Positive (Details:  )     Other Pertinent Labs and Pathology Results:   1. Radiology/Imaging:     XR CHEST PORTABLE   Final Result   Tortuous ectatic aorta   Cardiomegaly    Airspace disease, patchy diffuse, bilateral, worse in the interval                     XR CHEST PORTABLE   Final Result   Patchy bilateral infiltrates               XR CHEST PORTABLE   Final Result   Stable abnormal chest with the diffuse perihilar and bilateral   infiltrates and small pleural effusions which may be due to diffuse   pneumonia or edema. Endotracheal tube close to nesha and needs to be retracted by 2 cm. ALERT:  THIS IS AN ABNORMAL REPORT            US RETROPERITONEAL COMPLETE   Final Result   Normal bilateral renal ultrasound               XR ABDOMEN FOR NG/OG/NE TUBE PLACEMENT   Final Result   The tip of the tube is at the expected level of the gastric fundus. XR CHEST PORTABLE   Final Result   Cardiomegaly   Tortuous aorta    There are patchy infiltrates seen throughout both the lung fields. Pneumonia could give this appearance. Underlying edema could also have   this appearance   The chest appears to be worse in the interval                  XR CHEST PORTABLE   Final Result   No significant acute process               CT HEAD WO CONTRAST   Final Result         1. No acute intracranial abnormality.    2. Chronic infarction with encephalomalacia in the left frontal lobe,   insula and the Yes    Insulin drip:   [x] No   [] Yes    Family updated:    [] No   [x] Yes    Transfer Candidate?:   [x] No   [] Yes    Additional plans:  1. Volume management per nephrology  2. Antibiotics per infectious disease  3. Adjust ventilator  4. Transfuse  5. Consider volume removal now that blood pressure is good off pressors  6. Overall prognosis remains at best guarded          Chart review/lab review/X-ray viewing/documentation: 20 minutes  Assessment: 10 minutes  Conversation patient/family re: prognosis, care options and any end of life issues: 0 minutes  Conversation with staff: 20 minutes  Total critical care time exclusive of procedures: 50 minutes    If the patient is a COVID 19 isolation patient, the above physical exam reflects that of the examining physician for the day. Errol Rosario M.D. [place] [facility]

## 2020-08-13 NOTE — PROGRESS NOTES
Perfect served Dr. Corey Moore and Dr. Alicia Baum in regards to daughter Argenis Julio decision to make patient limited code.

## 2020-08-14 NOTE — PLAN OF CARE
Problem: Inadequate oral food/beverage intake (NI-2.1)  Goal: Food and/or Nutrient Delivery  Continue NPO. Rec to Modify Current TF to meet current needs. TF Rec:  Semi-Elemental (Vital AF 1.2) @ 50 ml/hr (Goal) x 24 hrs/d to provide 1200 ml TV, 1440 kcals, 90 gm Pro, 973 ml total water. Description: Individualized approach for food/nutrient provision.   Outcome: Met This Shift

## 2020-08-14 NOTE — FLOWSHEET NOTE
08/14/20 1900   Vital Signs   BP (!) 117/55   Pulse 99   Resp (!) 33   SpO2 91 %   Weight 172 lb 13.5 oz (78.4 kg)   Percent Weight Change -2.37   Post-Hemodialysis Assessment   Rinseback Volume (ml) 300 ml   Total Liters Processed (l/min) 68.9 l/min   Dialyzer Clearance Lightly streaked   Duration of Treatment (minutes) 240 minutes   Hemodialysis Intake (ml) 300 ml   Hemodialysis Output (ml) 2200 ml   NET Removed (ml) 1900 ml   Tolerated Treatment Good   Patient Response to Treatment tolerated tx. vss. report given to [facility]. uf 1900.    Bilateral Breath Sounds Diminished   Edema Generalized   Edema Generalized +2

## 2020-08-14 NOTE — PATIENT CARE CONFERENCE
Intensive Care Daily Quality Rounding Checklist      ICU Team Members: Dr. Kb Krause, charge nurse, bedside nurse, clinical pharmacist, respiratory therapist    ICU Day #: 5    Intubation Date: 8/10/20    Ventilator Day #: 5    Central Line Insertion Date: 8/10/20        Day #: 5     Arterial Line Insertion Date: 8/10/20      Day #: 5    DVT Prophylaxis: SCD's    GI Prophylaxis: Protonix    Bliss Catheter Insertion Date: 8/9/20       Day #: 5      Continued need (if yes, reason documented and discussed with physician): yes, acute renal failure, need for strict I+O's    Skin Issues/ Wounds and ordered treatment discussed on rounds: SOS precautions    Goals/ Plans for the Day: Daily labs, wean vent as tolerated, continue to update family on status

## 2020-08-14 NOTE — PROGRESS NOTES
Comprehensive Nutrition Assessment    Type and Reason for Visit:  Reassess    Nutrition Recommendations/Plan: Continue NPO. Rec to Modify Current TF to meet current needs. TF Recommendations:  Semi-Elemental (Vital AF 1.2) @ 50 ml/hr (Goal) Continuous x 24 hrs/d to provide 1200 ml TV, 1440 kcals, 90 gm Pro, 973 ml total water. Nutrition Assessment:  Pt generally stable from a nutritional stand-point AEB pt currently tolerating EN at goal however noted residuals since start of TF. Remains at risk d/t NPO/Vent/Sed on TF w/ increased needs for wound healing w/ ESRD/HD losses. Malnutrition Assessment:  Malnutrition Status: At risk for malnutrition (Comment)    Context:  Acute Illness     Findings of the 6 clinical characteristics of malnutrition:  Energy Intake:  Mild decrease in energy intake (Comment)(NPO)  Weight Loss:  No significant weight loss     Body Fat Loss:  No significant body fat loss     Muscle Mass Loss:  No significant muscle mass loss    Fluid Accumulation:  No significant fluid accumulation     Strength:  Not Performed    Estimated Daily Nutrient Needs:  Energy (kcal):  0434-8466(PSE03 (MinVol: 13.5 L/min, Tmax: 99°F)= 1412 kcals per ABW); Weight Used for Energy Requirements:  Admission     Protein (g):  80-95(1.5-1.8 gm/kg per ABW);  Weight Used for Protein Requirements:  Admission        Fluid (ml/day):  Per Renal MGMT; Weight Used for Fluid Requirements:  Admission      Nutrition Related Findings:  Int/Sed on vent, edentulous, Abd WDL, Hypo BS, +NGT, Gen/BUE/BLE +1 edema, +I/O's(temp HD cath 8/10; HD treatments)      Wounds:  Skin Tears       Current Nutrition Therapies:    Current Tube Feeding (TF) Orders:  · Feeding Route: Nasogastric  · Formula: Semi-Elemental  · Schedule: Continuous  · Additives/Modulars: (none)  · Water Flushes: No flush orders noted at this time  · Current TF & Flush Orders Provides: Same as Goal  · Goal TF & Flush Orders Provides: 1080 ml TV, 1296 kcals,

## 2020-08-14 NOTE — CARE COORDINATION
COVID negative 8/12. Vent/ intubated since 8/10. Temporary dialysis cath in place- last treatment 8/12. Daughter requesting Argenis RADER if needed on discharge. [facility] following.  Will follow Katy Hanson

## 2020-08-14 NOTE — PROGRESS NOTES
Department of Internal Medicine  Nephrology Attending Progress Note      Reason for Consult: GILDA  Requesting Physician:  Dr Juaquin Art:  Altered mental state    History Obtained From:  spouse, electronic medical record, reason patient could not give history:  altered mental status    Sub:  Patient seen and examined bedside in the ICU, events over the last 24 hours reviewed with the ICU RN.   She remains oliguric with a urine output of 120 mL in the last 24 hours in spite of IV Lasix  She also had uncontrolled hypertension and received PRN IV labetalol with some improvement in blood pressures  Past Medical History:        Diagnosis Date    Cerebral artery occlusion with cerebral infarction [facility]) 04/1989    Encounter regarding vascular access for dialysis for ESRD ([facility] [address] 8/10/2020    Hx of blood clots     Hypertension     Seizures ([facility] [address]      Past Surgical History:        Procedure Laterality Date    COLONOSCOPY  2019    ENDOSCOPY, COLON, DIAGNOSTIC      2019    SKIN GRAFT Right     R foot April 2020     Current Medications:    Current Facility-Administered Medications: insulin lispro (HUMALOG) injection vial 0-12 Units, 0-12 Units, Subcutaneous, Q4H  ipratropium-albuterol (DUONEB) nebulizer solution 1 ampule, 1 ampule, Inhalation, Q4H  cefTRIAXone (ROCEPHIN) 1 g in sterile water 10 mL IV syringe, 1 g, Intravenous, Q24H  hydrALAZINE (APRESOLINE) injection 5 mg, 5 mg, Intravenous, Q6H PRN  albumin human 25 % IV solution 25 g, 25 g, Intravenous, See Admin Instructions  glucose (GLUTOSE) 40 % oral gel 15 g, 15 g, Oral, PRN  glucagon (rDNA) injection 1 mg, 1 mg, Intramuscular, PRN  dextrose 5 % solution, 100 mL/hr, Intravenous, PRN  0.9 % sodium chloride infusion, , Intravenous, Continuous  pantoprazole (PROTONIX) injection 40 mg, 40 mg, Intravenous, Daily **AND** sodium chloride (PF) 0.9 % injection 10 mL, 10 mL, Intravenous, Daily  perflutren lipid microspheres (DEFINITY) injection 1.65 mg, 1.5 mL, Intravenous, ONCE PRN  chlorhexidine (PERIDEX) 0.12 % solution 15 mL, 15 mL, Mouth/Throat, BID  midazolam (VERSED) 1 mg/mL in D5W infusion, 1 mg/hr, Intravenous, Continuous  anticoagulant sodium citrate 4 GM/100ML solution 0.172 g, 4.3 mL, Intracatheter, Daily PRN  dextrose 50 % IV solution, 12.5 g, Intravenous, PRN  sodium chloride flush 0.9 % injection 10 mL, 10 mL, Intravenous, PRN  sodium chloride flush 0.9 % injection 10 mL, 10 mL, Intravenous, 2 times per day  sodium chloride flush 0.9 % injection 10 mL, 10 mL, Intravenous, PRN  acetaminophen (TYLENOL) tablet 650 mg, 650 mg, Oral, Q6H PRN **OR** acetaminophen (TYLENOL) suppository 650 mg, 650 mg, Rectal, Q6H PRN  polyethylene glycol (GLYCOLAX) packet 17 g, 17 g, Oral, Daily PRN  promethazine (PHENERGAN) tablet 12.5 mg, 12.5 mg, Oral, Q6H PRN **OR** ondansetron (ZOFRAN) injection 4 mg, 4 mg, Intravenous, Q6H PRN  thiamine (B-1) injection 100 mg, 100 mg, Intravenous, Daily  vitamin B-12 (CYANOCOBALAMIN) tablet 1,000 mcg, 1,000 mcg, Oral, Daily  folic acid (FOLVITE) tablet 1 mg, 1 mg, Oral, Daily  LORazepam (ATIVAN) tablet 1 mg, 1 mg, Oral, Q1H PRN **OR** LORazepam (ATIVAN) injection 1 mg, 1 mg, Intravenous, Q1H PRN **OR** LORazepam (ATIVAN) tablet 2 mg, 2 mg, Oral, Q1H PRN **OR** LORazepam (ATIVAN) injection 2 mg, 2 mg, Intravenous, Q1H PRN **OR** LORazepam (ATIVAN) tablet 3 mg, 3 mg, Oral, Q1H PRN **OR** LORazepam (ATIVAN) injection 3 mg, 3 mg, Intravenous, Q1H PRN **OR** LORazepam (ATIVAN) tablet 4 mg, 4 mg, Oral, Q1H PRN **OR** LORazepam (ATIVAN) injection 4 mg, 4 mg, Intravenous, Q1H PRN  nicotine (NICODERM CQ) 21 MG/24HR 1 patch, 1 patch, Transdermal, Daily  phenytoin (DILANTIN) 100 mg in sodium chloride 0.9 % 100 mL IVPB (maintenance dose), 100 mg, Intravenous, Q12H  Allergies:  Patient has no known allergies.     Social History:    TOBACCO:  Never used tobacco  ETOH:  Alcoholism present   DRUGS:  Never used recreational drugs    Family History: CALCIUM 7.5 08/14/2020    BILITOT 1.7 08/14/2020    ALKPHOS 198 08/14/2020    AST 25 08/14/2020    ALT 22 08/14/2020     Magnesium:    Lab Results   Component Value Date    MG 1.9 08/14/2020     Phosphorus:    Lab Results   Component Value Date    PHOS 2.8 08/14/2020     U/A:    Lab Results   Component Value Date    COLORU DARK YELLOW 08/09/2020    PROTEINU >=300 08/09/2020    PHUR 6.5 08/09/2020    WBCUA PACKED 08/09/2020    RBCUA 5-10 08/09/2020    BACTERIA MANY 08/09/2020    CLARITYU CLOUDY 08/09/2020    SPECGRAV 1.020 08/09/2020    LEUKOCYTESUR MODERATE 08/09/2020    UROBILINOGEN 0.2 08/09/2020    BILIRUBINUR SMALL 08/09/2020    BLOODU LARGE 08/09/2020    GLUCOSEU Negative 08/09/2020     ABG:    Lab Results   Component Value Date    PH 7.437 08/14/2020    PCO2 28.2 08/13/2020    PO2 91.7 08/13/2020    HCO3 19.7 08/13/2020    BE -3.4 08/14/2020    O2SAT 97.5 08/14/2020     Microalbumen/Creatinine ratio:  No components found for: RUCREAT  TSH:    Lab Results   Component Value Date    TSH 3.440 11/12/2016     VITAMIN B12: No components found for: B12  FOLATE:    Lab Results   Component Value Date    FOLATE 10.5 08/09/2020     IRON:    Lab Results   Component Value Date    IRON 16 08/09/2020     Iron Saturation:  No components found for: PERCENTFE  TIBC:    Lab Results   Component Value Date    TIBC 111 08/09/2020     FERRITIN:    Lab Results   Component Value Date    FERRITIN 914 08/09/2020     MANDO:  No results found for: ANATITER, MANDO  AMYLASE:  No results found for: AMYLASE  LIPASE:    Lab Results   Component Value Date    LIPASE 13 08/09/2020   Results for Arnav Creamer (MRN [id number]) as of 8/12/2020 11:30   Ref. Range 8/12/2020 00:00 8/12/2020 05:00   Lactic Acid Latest Ref Range: 0.5 - 2.2 mmol/L 2.4 (H) 2.4 (H)   Results for Jannble Creamer (MRN [id number]) as of 8/12/2020 11:30   Ref.  Range 8/12/2020 09:39   Fibrinogen Latest Ref Range: 225 - 540 mg/dL 636 (H)   aPTT Latest Ref Range: 24.5 - 35.1 sec 25.5 D-Dimer, Quant Latest Units: ng/mL DDU >5250   Results for Maria Isabel Velasquez (MRN [id number]) as of 8/12/2020 11:30   Ref. Range 8/12/2020 05:12   pH, Blood Gas Latest Ref Range: 7.350 - 7.450  7.366   PCO2 Latest Ref Range: 35.0 - 45.0 mmHg 24.6 (L)   pO2 Latest Ref Range: 75.0 - 100.0 mmHg 114.4 (H)   HCO3 Latest Ref Range: 22.0 - 26.0 mmol/L 13.8 (L)       CXR 8/12:  Impression    Patchy bilateral infiltrates                 IMPRESSION/RECOMMENDATIONS:      Nia Islas is a 43-year-old white female with a recent history of altered mental status and multiple falls at home. Her neighbor called EMS yesterday due to her altered mental status. She does have a history of chronic alcoholism. Her daughter spoke with her on the phone approximately 1 week ago and at that time the patient was mildly altered. Upon presentation to the emergency department, the patient was unable to answer questions. My history was obtained through the review of the patient's chart. There has been no recent diarrhea. Per the nursing staff the patient had pus come from the bladder catheter when it was placed. After admission, patient was started on iv fluids and antibiotics for possible sepsis and GILDA with hyponatremia. Her serum creatinine was elevated to 3.7 mg/dl from baseline 0.7 therefore this consultation was requested. This morning patient is hypotensive so RRT was called. 1. GILDA - anuric with septic shock causing severe ischemic ATN- will rule out TTP/HUS in view of splenomegaly, mental status changes and severe thrombocytopenia. - severe hyperkalemia noted- will initiate HD support in view of oligoanuria    2. NAGMA secondary to GILDA  3. Mental status changes with history of CVA- multifactorial ?  4. Anemia/thrombocytopenia - ?hemolytic process - hematology on case  5. Sepsis- on Rocephin, ID consulted- on Rocephin  6. Complicated urinary tract infection  7.   Hypokalemia, secondary to poor intake, potassium removal with

## 2020-08-14 NOTE — PROGRESS NOTES
[address] [place] [address] [facility]  Progress Note      Chief Complaint   Patient presents with    Fatigue     history cva with rt weakness    Fall     multiple falls over last few young. multiple bruises on torso. SUBJECTIVE:  Patient was seen and examined at bedside this morning. Patient is still intubated and sedated. Patient remained afebrile overnight. Neosynephrine has been weaned off  Getting hemodialysis now  Vented 40% FiO2    -Blood culture returned positive for pansensitive E. Coli  -Respiratory culture positive for Kleb and rare gram-positive cocci= reduced oral diana        Review of systems:  Unable to obtain as patient is sedated and intubated.     Medications:  Scheduled Meds:   insulin lispro  0-12 Units Subcutaneous Q4H    ipratropium-albuterol  1 ampule Inhalation Q4H    epoetin abdelrahman-epbx  3,000 Units Subcutaneous Once per day on Mon Wed Fri    cefTRIAXone (ROCEPHIN) IV  1 g Intravenous Q24H    albumin human  25 g Intravenous See Admin Instructions    pantoprazole  40 mg Intravenous Daily    And    sodium chloride (PF)  10 mL Intravenous Daily    chlorhexidine  15 mL Mouth/Throat BID    sodium chloride flush  10 mL Intravenous 2 times per day    thiamine  100 mg Intravenous Daily    vitamin B-12  1,000 mcg Oral Daily    folic acid  1 mg Oral Daily    nicotine  1 patch Transdermal Daily    phenytoin (DILANTIN) maintenance dose IVPB  100 mg Intravenous Q12H     Continuous Infusions:   dextrose      sodium chloride Stopped (08/13/20 0900)    midazolam 10 mg/hr (08/14/20 0650)     PRN Meds:hydrALAZINE, glucose, glucagon (rDNA), dextrose, perflutren lipid microspheres, anticoagulant sodium citrate, dextrose, sodium chloride flush, sodium chloride flush, acetaminophen **OR** acetaminophen, polyethylene glycol, promethazine **OR** ondansetron, LORazepam **OR** LORazepam **OR** LORazepam **OR** LORazepam **OR** LORazepam **OR** LORazepam **OR** LORazepam **OR** LORazepam    OBJECTIVE:  /66   Pulse 119   Temp 98.6 °F (37 °C) (Oral)   Resp (!) 37   Ht 5' 2\" (1.575 m)   Wt 177 lb 0.5 oz (80.3 kg)   SpO2 96%   BMI 32.38 kg/m²   Temp  Av.6 °F (37 °C)  Min: 98.4 °F (36.9 °C)  Max: 99 °F (37.2 °C)    Constitutional: The patient is intubated and sedated. 40% peep of 5  Skin: Warm and dry. No rashes were noted. No jaundice. Cyanosis in fingers and toes in all 4 extremities  HEENT: Eyes show round, and reactive pupils. Moist mucous membranes, no ulcerations, no thrush. Neck: Supple to movements. No lymphadenopathy. Chest: Intubated. No secretions suctioned from ET tube. Rhonchus bilaterally  Cardiovascular: S1 and S2 are rhythmic and regular. No murmurs appreciated. Abdomen: Started on TF; Positive bowel sounds to auscultation. Benign to palpation. No masses felt. No hepatosplenomegaly. Genitourinary: Bliss in place but no urine output  Extremities: No clubbing, no edema; cyanosis in all fingers and toes in all 4 extremities  Musculoskeletal: Equal and symmetrical  Neurological: sedated; withdraws to pain while off sedation  Lines: 2 peripheral IVs. Arterial line - left femoral (8/10).  Temporary HD catheter - right femoral (8/10)        Laboratory and Tests Review:  Lab Results   Component Value Date    WBC 12.5 (H) 2020    WBC 11.0 2020    WBC 14.5 (H) 2020    HGB 9.9 (L) 2020    HCT 28.4 (L) 2020    MCV 91.9 2020    PLT 22 (L) 2020     Lab Results   Component Value Date    NEUTROABS 11.25 (H) 2020    NEUTROABS 10.23 (H) 2020    NEUTROABS 13.49 (H) 2020     No results found for: [facility]  Lab Results   Component Value Date    ALT 22 2020    AST 25 2020    ALKPHOS 198 (H) 2020    BILITOT 1.7 (H) 2020     Lab Results   Component Value Date     2020    K 4.0 2020    K 6.4 08/10/2020    CL 97 2020    CO2 20 2020    BUN 31 2020    CREATININE · Uremia - improved after dialysis  · Lactic acidosis - improving   · Acute respiratory failure. Currently intubated  · Anuric GILDA - currently on dialysis   · Thrombocytopenia likely 2/2 sepsis and chronic alcohol use, overwhelming sepsis and possibly early DIC  · Alcohol use disorder with liver cirrhosis    PLAN:  · Urine and blood culture positive for E.coli (pansensitive)  · Respiratory culture positive for sensitive Kleb pneumoniae  · ceftriaxone 1 g every 24 hours.    · Monitor labs    Carmen Ellis MD  2:59 PM  8/14/2020

## 2020-08-14 NOTE — PROGRESS NOTES
lAfred Schw[facility] Hospitalist   Progress Note    Admitting Date and Time: 8/9/2020 12:10 PM  Admit Dx: Sepsis ([facility] [address] [A41.9]  Sepsis ([facility] [address] [A41.9]     Seen for follow on multiple problems as listed below -     Subjective: In ICU , intubated on vent , d/w ICU team , dtr POA has decided limited code. Continue to follow with her. ROS: Not possible sec to above.      insulin lispro  0-12 Units Subcutaneous Q4H    ipratropium-albuterol  1 ampule Inhalation Q4H    cefTRIAXone (ROCEPHIN) IV  1 g Intravenous Q24H    albumin human  25 g Intravenous See Admin Instructions    pantoprazole  40 mg Intravenous Daily    And    sodium chloride (PF)  10 mL Intravenous Daily    chlorhexidine  15 mL Mouth/Throat BID    sodium chloride flush  10 mL Intravenous 2 times per day    thiamine  100 mg Intravenous Daily    vitamin B-12  1,000 mcg Oral Daily    folic acid  1 mg Oral Daily    nicotine  1 patch Transdermal Daily    phenytoin (DILANTIN) maintenance dose IVPB  100 mg Intravenous Q12H     hydrALAZINE, 5 mg, Q6H PRN  glucose, 15 g, PRN  glucagon (rDNA), 1 mg, PRN  dextrose, 100 mL/hr, PRN  perflutren lipid microspheres, 1.5 mL, ONCE PRN  anticoagulant sodium citrate, 4.3 mL, Daily PRN  dextrose, 12.5 g, PRN  sodium chloride flush, 10 mL, PRN  sodium chloride flush, 10 mL, PRN  acetaminophen, 650 mg, Q6H PRN    Or  acetaminophen, 650 mg, Q6H PRN  polyethylene glycol, 17 g, Daily PRN  promethazine, 12.5 mg, Q6H PRN    Or  ondansetron, 4 mg, Q6H PRN  LORazepam, 1 mg, Q1H PRN    Or  LORazepam, 1 mg, Q1H PRN    Or  LORazepam, 2 mg, Q1H PRN    Or  LORazepam, 2 mg, Q1H PRN    Or  LORazepam, 3 mg, Q1H PRN    Or  LORazepam, 3 mg, Q1H PRN    Or  LORazepam, 4 mg, Q1H PRN    Or  LORazepam, 4 mg, Q1H PRN         Objective:    /66   Pulse 119   Temp 98.6 °F (37 °C) (Oral)   Resp (!) 37   Ht 5' 2\" (1.575 m)   Wt 177 lb 0.5 oz (80.3 kg)   SpO2 96%   BMI 32.38 kg/m²   Intubated on vent  HEENT - normocephalic , no icterus  Neck supple ,no masses  Heart RRR S1S2N  Lungs - Bilat coarse BS vs ronchii  Abd - soft NTND   Ext - with bluish /black discoloration   Skin - warm & dry  NE -on vent , responds some what to pain,further details not possible  Psych - not possible. Recent Labs     08/12/20  1634 08/13/20  0600 08/14/20  0555   * 132 130*   K 3.3* 3.1* 4.0   CL 96* 97* 97*   CO2 24 23 20*   BUN 13 19 31*   CREATININE 1.2* 1.6* 1.9*   GLUCOSE 207* 201* 241*   CALCIUM 6.2* 7.2* 7.5*       Recent Labs     08/12/20  0500 08/12/20  1405 08/13/20  0600 08/14/20  0555   WBC 14.5*  --  11.0 12.5*   RBC 2.42*  --  2.04* 3.09*   HGB 8.1*  --  6.7* 9.9*   HCT 22.9*  --  19.5* 28.4*   MCV 94.6  --  95.6 91.9   MCH 33.5  --  32.8 32.0   MCHC 35.4*  --  34.4 34.9*   RDW 15.7*  --  15.9* 16.0*   PLT 10* 34* 17* 22*   MPV 12.3*  --  NOT CALC NOT CALC       Labs and images reviewed     Radiology:   XR CHEST PORTABLE   Final Result      No significant change from the previous exam.    Stable position of support lines and tubes. The study was dictated by Bertha Ching PA-C and Steffanie Wagner. Baron Long MD   reviewed and concurred with the findings. XR CHEST PORTABLE   Final Result   Tortuous ectatic aorta   Cardiomegaly    Airspace disease, patchy diffuse, bilateral, worse in the interval                     XR CHEST PORTABLE   Final Result   Patchy bilateral infiltrates               XR CHEST PORTABLE   Final Result   Stable abnormal chest with the diffuse perihilar and bilateral   infiltrates and small pleural effusions which may be due to diffuse   pneumonia or edema. Endotracheal tube close to nesha and needs to be retracted by 2 cm. ALERT:  THIS IS AN ABNORMAL REPORT            US RETROPERITONEAL COMPLETE   Final Result   Normal bilateral renal ultrasound               XR ABDOMEN FOR NG/OG/NE TUBE PLACEMENT   Final Result   The tip of the tube is at the expected level of the gastric fundus.             XR CHEST PORTABLE   Final Result   Cardiomegaly   Tortuous aorta    There are patchy infiltrates seen throughout both the lung fields. Pneumonia could give this appearance. Underlying edema could also have   this appearance   The chest appears to be worse in the interval                  XR CHEST PORTABLE   Final Result   No significant acute process               CT HEAD WO CONTRAST   Final Result         1. No acute intracranial abnormality. 2. Chronic infarction with encephalomalacia in the left frontal lobe,   insula and the lateral aspect of the basal ganglia. CT CERVICAL SPINE WO CONTRAST   Final Result   1. No fracture or joint dislocation is seen in the cervical spine. 2. Minimal anterior wedging of the T1 vertebral body indicates an   age-indeterminate fracture. If indicated, MRI may be obtained for   further evaluation. CT CHEST WO CONTRAST   Final Result    1. Minimal age-indeterminate compression fracture deformities of the   T1 and T9 vertebral bodies. If indicated, MRI may be obtained for   further evaluation. 2. No other traumatic abnormality is seen. 3. Mild cardiomegaly with evidence of mild pulmonary arterial   hypertension. CT ABDOMEN PELVIS WO CONTRAST Additional Contrast? None   Final Result   Findings suggestive of cirrhosis with splenomegaly. No acute   abnormality in the abdomen or pelvis. XR CHEST PORTABLE   Final Result   Streaky opacities at the lung bases are nonspecific and may represent   atelectasis, scarring or an acute infectious/inflammatory process. XR CHEST PORTABLE    (Results Pending)   XR CHEST PORTABLE    (Results Pending)       Assessment:    Active Problems:    Sepsis [facility])    Encounter regarding vascular access for dialysis for ESRD [facility])  Resolved Problems:    * No resolved hospital problems.  *      Plan:  Sepsis POA sec to Complicated Ecoli uti and bacteremia  / septic shock - she was transferred to ICU following RRT on 8/10/20. Intubated on vent , was placed on levophed followed by ila synephrine. ID and intensivist following. On IV ceftriax as per ID. Currently off of vasopressors. Monitor in ICU . CCT  Following. As above will follow with family. Acute resp failure - on vent support as above , intensivist following. Acute encephalopathy - multifactorial sec to sepsis /[place] /LA/MA - renal following and is receiving intermittent HD,had temporary HD cath placed on 8/10. Last HD rx 8/12    [place] /lactic acidosis/Metabolic acidosis/electrolyte imbalance - likely sec to ATN ,renal following , receiving intermittent HD , monitor. Replete electrolytes as necessary. Thrombocytopenia & Anemia  - d/t splenic sequestration[has splenomegaly as per CT] also sec to cirrhosis, hemonc consulted. suggests transfuse for PLTs < 20 & Hgb < 7 , monitor . Seizure - on IV phenytoin , monitor. Chronic ETOH use and cirrhosis - on ciwa scale , monitor. HTN by Hx - Lisinopril was  on hold sec to hypotension . Now BP up trending on Hydralazine prn. Multiple falls with age indeterminate compression fxs of the spine     DVT prophy - Lovenox   Full code     As above dtr POA has made decision & she is limited code.           Electronically signed by Tomasz Ngo MD on 8/14/2020 at 1:37 PM

## 2020-08-14 NOTE — PROGRESS NOTES
Critical Care Team: Daily Progress Note         Date and time: 8/14/2020 12:11 PM    Patient's name:  Epifanio Montes    Medical Record Number: [id number]    Patient's account/billing number: [de-identified]    Patient's YOB: 1955    Age: 63 y.o. Date of Admission: 8/9/2020 12:10 PM    Length of stay during current admission: 5    Primary Care Physician: No primary care provider on file. Previous Pulmonary: None    Code Status: Limited    PMH:    Past Medical History:   Diagnosis Date    Cerebral artery occlusion with cerebral infarction [facility]) 04/1989    Encounter regarding vascular access for dialysis for ESRD ([facility] [address] 8/10/2020    Hx of blood clots     Hypertension     Seizures ([facility]        PSH:   Past Surgical History:   Procedure Laterality Date    COLONOSCOPY  2019    ENDOSCOPY, COLON, DIAGNOSTIC      2019    SKIN GRAFT Right     R foot April 2020       Reason for ICU admission:   1. Sepsis with septic shock, gram-negative. 2. Renal failure  3. Metabolic acidosis  4. Profound thrombocytopenia      Subjective:     Events in the past 24 Hours:   1.  For withdrawl      Current ventilation:     [x] Ventilator  [] BIPAP/AVAPS  [] Nasal Cannula [] Room Air      Secretions      Amount:  [] Small [] Moderate  _ Large  [x] None  Color:     - White - Colored  - Bloody    Sedation:  RAAS Score:  [] Propofol gtt  [] Fentanyl gtt  [] Ativan gtt   [x] No Sedation    Paralyzed?:  [x] No    [] Yes      Vasopressors:  [] No    [x] Yes    If yes -   [] Levophed       [] Dopamine     [] Vasopressin       [] Dobutamine  [] Phenylephrine         [] Epinephrine    Central line:     [] No   [x] Yes         If yes -       [] Right IJ     [] Left IJ [x] Right Femoral [] Left Femoral                   [] Right Subclavian [] Left Subclavian       Bliss catheter:   [] No   [x] Yes     Urine output:            [] Good   [] Low              [x] Anuric      Objective:     Vital signs:  /66   Pulse 119   Temp 98.6 °F (37 °C) (Oral)   Resp (!) 40   Ht 5' 2\" (1.575 m)   Wt 177 lb 0.5 oz (80.3 kg)   SpO2 97%   BMI 32.38 kg/m²   Tmax over 24 hours:  Temp (24hrs), Av.9 °F (37.2 °C), Min:98.4 °F (36.9 °C), Max:100.3 °F (37.9 °C)      Patient Vitals for the past 6 hrs:   Pulse Resp SpO2   20 0900 119 (!) 40 97 %   20 0800 118 (!) 39 97 %   20 0700 116 (!) 39 96 %         Intake/Output Summary (Last 24 hours) at 2020 1211  Last data filed at 2020 0548  Gross per 24 hour   Intake 2884 ml   Output 120 ml   Net 2764 ml     Wt Readings from Last 2 Encounters:   20 177 lb 0.5 oz (80.3 kg)   20 115 lb (52.2 kg)     Body mass index is 32.38 kg/m². Physical examination:    General: Intubated and ventilated with dyssynchrony with mechanical ventilation  Eyes: PERRL. No sclera icterus. No conjunctival injection. ENT: No discharge. Pharynx clear. Neck: Trachea midline. Normal thyroid. Resp: No accessory muscle use. No rales. No wheezing. No rhonchi. CV: Regular rate. Regular rhythm. No murmur or rub. Abd: Non-tender. Non-distended. No masses. No organmegaly. Normal bowel sounds. Skin: Warm and dry. No nodules on exposed extremities. No rash on exposed extremities. Ext: No cyanosis, clubbing, edema. Fingertips are becoming discolored  Lymph: No cervical LAD. No supraclavicular LAD. M/S: No cyanosis. No joint deformity. No clubbing. Black toes  Neuro: Positive pupils/gag/corneals. Normal pain response.     Medications:    Scheduled Meds:   insulin lispro  0-12 Units Subcutaneous Q4H    ipratropium-albuterol  1 ampule Inhalation Q4H    ipratropium-albuterol  1 ampule Inhalation Q4H    cefTRIAXone (ROCEPHIN) IV  1 g Intravenous Q24H    albumin human  25 g Intravenous See Admin Instructions    pantoprazole  40 mg Intravenous Daily    And    sodium chloride (PF)  10 mL Intravenous Daily    chlorhexidine  15 mL Mouth/Throat BID    sodium chloride flush  10 mL Intravenous 2 times per day    thiamine  100 mg Intravenous Daily    vitamin B-12  1,000 mcg Oral Daily    folic acid  1 mg Oral Daily    nicotine  1 patch Transdermal Daily    phenytoin (DILANTIN) maintenance dose IVPB  100 mg Intravenous Q12H     Continuous Infusions:   dextrose      sodium chloride Stopped (08/13/20 0900)    midazolam 10 mg/hr (08/14/20 0650)     PRN Meds:   hydrALAZINE, 5 mg, Q6H PRN  glucose, 15 g, PRN  glucagon (rDNA), 1 mg, PRN  dextrose, 100 mL/hr, PRN  perflutren lipid microspheres, 1.5 mL, ONCE PRN  anticoagulant sodium citrate, 4.3 mL, Daily PRN  dextrose, 12.5 g, PRN  sodium chloride flush, 10 mL, PRN  sodium chloride flush, 10 mL, PRN  acetaminophen, 650 mg, Q6H PRN    Or  acetaminophen, 650 mg, Q6H PRN  polyethylene glycol, 17 g, Daily PRN  promethazine, 12.5 mg, Q6H PRN    Or  ondansetron, 4 mg, Q6H PRN  LORazepam, 1 mg, Q1H PRN    Or  LORazepam, 1 mg, Q1H PRN    Or  LORazepam, 2 mg, Q1H PRN    Or  LORazepam, 2 mg, Q1H PRN    Or  LORazepam, 3 mg, Q1H PRN    Or  LORazepam, 3 mg, Q1H PRN    Or  LORazepam, 4 mg, Q1H PRN    Or  LORazepam, 4 mg, Q1H PRN          Vent Settings (Comprehensive) (if applicable):  Vent Information  $Ventilation: $Subsequent Day  Equipment ID: [id number]  Vent Type: [id number]  Vent Mode: AC/VC  Vt Ordered: 350 mL  Rate Set: 12 bmp  Peak Flow: 40 L/min  Pressure Support: 0 cmH20  FiO2 : 40 %  SpO2: 97 %  SpO2/FiO2 ratio: 242.5  Sensitivity: 3  PEEP/CPAP: 5  I Time/ I Time %: 0 s  Humidification Source: HME  Additional Respiratory  Assessments  Pulse: 119  Resp: (!) 40  SpO2: 97 %  Position: Semi-Hahn's  Humidification Source: HME  Oral Care: Mouth swabbed, Mouth moisturizer, Mouth suctioned  Subglottic Suction Done?: Yes  Cuff Pressure (cm H2O): 29 cm H2O    ABGs:   Recent Labs     08/13/20  0612 08/14/20  0601   PH 7.461* 7.437   PCO2 28.2*  --    PO2 91.7  --    HCO3 19.7*  --    BE -3.6* -3.4*   O2SAT 97.7 97.5       Laboratory findings:    Complete Blood Count:   Recent Labs     08/12/20  0500 08/12/20  1405 08/13/20  0600 08/14/20  0555   WBC 14.5*  --  11.0 12.5*   HGB 8.1*  --  6.7* 9.9*   HCT 22.9*  --  19.5* 28.4*   PLT 10* 34* 17* 22*        Last 3 Blood Glucose:   Recent Labs     08/12/20  1634 08/13/20  0600 08/14/20  0555   GLUCOSE 207* 201* 241*        PT/INR:    Lab Results   Component Value Date    PROTIME 12.4 08/12/2020    INR 1.1 08/12/2020     PTT:    Lab Results   Component Value Date    APTT 25.5 08/12/2020       Comprehensive Metabolic Profile:   Recent Labs     08/12/20  1634 08/13/20  0600 08/14/20  0555   * 132 130*   K 3.3* 3.1* 4.0   CL 96* 97* 97*   CO2 24 23 20*   BUN 13 19 31*   CREATININE 1.2* 1.6* 1.9*   GLUCOSE 207* 201* 241*   CALCIUM 6.2* 7.2* 7.5*   PROT  --  6.2* 5.9*   LABALBU  --  3.2* 2.4*   BILITOT  --  2.2* 1.7*   ALKPHOS  --  272* 198*   AST  --  53* 25   ALT  --  32 22      Magnesium:   Lab Results   Component Value Date    MG 1.9 08/14/2020     Phosphorus:   Lab Results   Component Value Date    PHOS 2.8 08/14/2020     Ionized Calcium:   Lab Results   Component Value Date    CAION 0.90 08/12/2020        Urinalysis:     Troponin: No results for input(s): TROPONINI in the last 72 hours. Microbiology past 24h:     Blood cultures:                 [] None drawn      [] Negative             []  Positive (Details: Pansensitive E. coli.)  Urine Culture:                   [] None drawn      [] Negative             []  Positive (Details:  )  Sputum Culture:               [] None drawn       [] Negative             []  Positive (Details:  )   Endotracheal aspirate:     [] None drawn       [] Negative             []  Positive (Details:  )     Other Pertinent Labs and Pathology Results:   1. Radiology/Imaging:     XR CHEST PORTABLE   Final Result      No significant change from the previous exam.    Stable position of support lines and tubes. The study was dictated by Bertha Ching PA-C and Steffanie Wagner.  Baron Long MD   reviewed and concurred with the findings. XR CHEST PORTABLE   Final Result   Tortuous ectatic aorta   Cardiomegaly    Airspace disease, patchy diffuse, bilateral, worse in the interval                     XR CHEST PORTABLE   Final Result   Patchy bilateral infiltrates               XR CHEST PORTABLE   Final Result   Stable abnormal chest with the diffuse perihilar and bilateral   infiltrates and small pleural effusions which may be due to diffuse   pneumonia or edema. Endotracheal tube close to nesha and needs to be retracted by 2 cm. ALERT:  THIS IS AN ABNORMAL REPORT            US RETROPERITONEAL COMPLETE   Final Result   Normal bilateral renal ultrasound               XR ABDOMEN FOR NG/OG/NE TUBE PLACEMENT   Final Result   The tip of the tube is at the expected level of the gastric fundus. XR CHEST PORTABLE   Final Result   Cardiomegaly   Tortuous aorta    There are patchy infiltrates seen throughout both the lung fields. Pneumonia could give this appearance. Underlying edema could also have   this appearance   The chest appears to be worse in the interval                  XR CHEST PORTABLE   Final Result   No significant acute process               CT HEAD WO CONTRAST   Final Result         1. No acute intracranial abnormality. 2. Chronic infarction with encephalomalacia in the left frontal lobe,   insula and the lateral aspect of the basal ganglia. CT CERVICAL SPINE WO CONTRAST   Final Result   1. No fracture or joint dislocation is seen in the cervical spine. 2. Minimal anterior wedging of the T1 vertebral body indicates an   age-indeterminate fracture. If indicated, MRI may be obtained for   further evaluation. CT CHEST WO CONTRAST   Final Result    1. Minimal age-indeterminate compression fracture deformities of the   T1 and T9 vertebral bodies. If indicated, MRI may be obtained for   further evaluation. 2. No other traumatic abnormality is seen.    3. Mild cardiomegaly with evidence of mild pulmonary arterial   hypertension. CT ABDOMEN PELVIS WO CONTRAST Additional Contrast? None   Final Result   Findings suggestive of cirrhosis with splenomegaly. No acute   abnormality in the abdomen or pelvis. XR CHEST PORTABLE   Final Result   Streaky opacities at the lung bases are nonspecific and may represent   atelectasis, scarring or an acute infectious/inflammatory process. XR CHEST PORTABLE    (Results Pending)   XR CHEST PORTABLE    (Results Pending)             Assessment:     Active Problems:    Sepsis [facility])    Encounter regarding vascular access for dialysis for ESRD [facility])  Resolved Problems:    * No resolved hospital problems. *      Additional assessment:    1. Sepsis with septic shock, urinary source likely, pansensitive E. coli in the blood. 2. GILDA  3. Metabolic acidosis: Renal and lactic  4. Thrombocytopenia: Ongoing  5. Mild pulmonary edema  6. With no improvement, family wishes to withdraw RX. Plan:     Wean per protocol:  [x] No   [] Yes  [] N/A    ICU Prophylaxis:  Stress ulcer:  [x] PPI Agent  [] [id number] [] Sucralfate  [] Other:  VTE:   [] Enoxaparin, contraindicated  [] Unfract. Heparin Subcut  [] EPC Cuffs    Nutrition:  [] NPO [] Tube Feeding (Specify: ) [] TPN  [x] PO (Diet: DIET TUBE FEED CONTINUOUS/CYCLIC NPO; Semi-elemental; Nasogastric; Continuous; 20; 45; 24)    Home medications reconciled: [] No  [x] Yes    Insulin drip:   [x] No   [] Yes    Family updated:    [] No   [x] Yes    Transfer Candidate?:   [x] No   [] Yes    Additional plans:  1. Volume management per nephrology  2. Antibiotics per infectious disease  3. Consider volume removal now that blood pressure is good off pressors  4. Overall prognosis, given lack of any sign of reperfusion or renal recovery is poor.     Chart review/lab review/X-ray viewing/documentation: 10 minutes  Assessment: 10 minutes  Conversation patient/family re: prognosis, care options and any end of life issues: 15 minutes  Conversation with staff: 10 minutes  Total critical care time exclusive of procedures: 40 minutes    If the patient is a COVID 19 isolation patient, the above physical exam reflects that of the examining physician for the day. Krystal Luque M.D. Juan CHRISTIE

## 2020-08-15 NOTE — PLAN OF CARE
Problem: Falls - Risk of:  Goal: Will remain free from falls  Description: Will remain free from falls  8/15/2020 0109 by Chata Callahan RN  Outcome: Met This Shift  8/14/2020 1749 by Mary Mccord RN  Outcome: Met This Shift  Goal: Absence of physical injury  Description: Absence of physical injury  8/14/2020 1749 by Mary Mccord RN  Outcome: Met This Shift     Problem: Skin Integrity:  Goal: Will show no infection signs and symptoms  Description: Will show no infection signs and symptoms  8/15/2020 0109 by Chata Callahan RN  Outcome: Met This Shift  8/14/2020 [id number] [employer] by Mary Mccord RN  Outcome: Met This Shift  Goal: Absence of new skin breakdown  Description: Absence of new skin breakdown  8/14/2020 1749 by Mary Mccord RN  Outcome: Met This Shift     Problem: Confusion - Acute:  Goal: Absence of continued neurological deterioration signs and symptoms  Description: Absence of continued neurological deterioration signs and symptoms  8/15/2020 0109 by Chata Callahan RN  Outcome: Met This Shift  8/14/2020 [id number] [employer] by Mary Mccord RN  Outcome: Met This Shift  Goal: Mental status will be restored to baseline  Description: Mental status will be restored to baseline  8/14/2020 1749 by Mary Mccord RN  Outcome: Met This Shift     Problem: Discharge Planning:  Goal: Ability to perform activities of daily living will improve  Description: Ability to perform activities of daily living will improve  8/14/2020 1749 by Mary Mccord RN  Outcome: Met This Shift  Goal: Participates in care planning  Description: Participates in care planning  8/14/2020 [id number] [employer] by Mary Mccord RN  Outcome: Met This Shift     Problem: Injury - Risk of, Physical Injury:  Goal: Will remain free from falls  Description: Will remain free from falls  8/15/2020 0109 by Chata Callahan RN  Outcome: Met This Shift  8/14/2020 1749 by Mary Mccord RN  Outcome: Met This Shift  Goal: Absence of physical injury  Description: Absence of physical injury  8/14/2020 1749 by Car Fagan RN  Outcome: Met This Shift     Problem: Mood - Altered:  Goal: Mood stable  Description: Mood stable  8/14/2020 1749 by Car Fagan RN  Outcome: Met This Shift  Goal: Absence of abusive behavior  Description: Absence of abusive behavior  8/14/2020 1749 by Car Fagan RN  Outcome: Met This Shift  Goal: Verbalizations of feeling emotionally comfortable while being cared for will increase  Description: Verbalizations of feeling emotionally comfortable while being cared for will increase  8/14/2020 1749 by Car Fagan RN  Outcome: Met This Shift     Problem: Psychomotor Activity - Altered:  Goal: Absence of psychomotor disturbance signs and symptoms  Description: Absence of psychomotor disturbance signs and symptoms  8/14/2020 1749 by Car Fagan RN  Outcome: Met This Shift     Problem: Sensory Perception - Impaired:  Goal: Demonstrations of improved sensory functioning will increase  Description: Demonstrations of improved sensory functioning will increase  8/14/2020 1749 by Car Fagan RN  Outcome: Met This Shift  Goal: Decrease in sensory misperception frequency  Description: Decrease in sensory misperception frequency  8/14/2020 1749 by Car Fagan RN  Outcome: Met This Shift  Goal: Able to refrain from responding to false sensory perceptions  Description: Able to refrain from responding to false sensory perceptions  8/14/2020 1749 by Car Fagan RN  Outcome: Met This Shift  Goal: Demonstrates accurate environmental perceptions  Description: Demonstrates accurate environmental perceptions  8/14/2020 1749 by Car Fagan RN  Outcome: Met This Shift  Goal: Able to distinguish between reality-based and nonreality-based thinking  Description: Able to distinguish between reality-based and nonreality-based thinking  8/14/2020 1749 by Car Fagan RN  Outcome: Met This Shift  Goal: Able to interrupt nonreality-based

## 2020-08-15 NOTE — DISCHARGE SUMMARY
[facility] EMERGENCY SERVICE Physician Discharge Summary       Discharge / Death summary :    Patient was pronounced at 5 R as per records    Cause of death - E. coli septic shock with multiorgan failure      Patient ID:  Herson Callaway  [id number]  74 y.o.  1955    Admit date: 8/9/2020    Discharge date and time:  8/15/2020  11:54 AM    Admission Diagnoses: Active Problems:    Sepsis [facility])    Encounter regarding vascular access for dialysis for ESRD [facility])  Resolved Problems:    * No resolved hospital problems. *      Discharge Diagnoses: Active Problems:    Sepsis [facility])    Encounter regarding vascular access for dialysis for ESRD [facility])  Resolved Problems:    * No resolved hospital problems. *      Consults:  IP CONSULT TO HEM/ONC  IP CONSULT TO NEPHROLOGY  IP CONSULT TO SOCIAL WORK  IP CONSULT TO VASCULAR SURGERY  IP CONSULT TO INFECTIOUS DISEASES  IP CONSULT TO CRITICAL CARE  IP CONSULT TO IV TEAM  IP CONSULT TO DIETITIAN  IP CONSULT TO IV TEAM  IP CONSULT TO IV TEAM      Hospital Course:  She is a 53-year-old female with past medical history of hypertension, seizures was admitted on 8/9/2020 with altered mental status and multiple falls at home. Work-up in ER with sepsis present on admission secondary to complicated UTI -had hypotension, tachycardia, leukocytosis, GILDA . she was started on IV Rocephin, IV fluids and other supportive treatment. ID and renal were consulted. She also had a history of chronic alcoholism some with cirrhosis -she was placed on CIWA scale. Her blood cultures and urine cultures were positive for E. Coli. On 8/10/2020 she was transferred to ICU following RRT. She required intubation and ventilator. She required IV Levophed and Jose-Synephrine. Critical care team was managing. Intensivist was managing ventilator settings. Her acute encephalopathy was multifactorial secondary to septic shock/GILDA/lactic acidosis and metabolic acidosis.   Temporary dialysis access was placed  . She was receiving intermittent hemodialysis treatment while in ICU. Later she was weaned off of IV vasopressors. As per intensivist and nephrology she did not had any meaningful recovery. She was noted to have anemia and thrombocytopenia which was thought to be secondary to splenic sequestration[she had CT evidence of splenomegaly] with underlying cirrhosis . Plan was to transfuse if hemoglobin is less than 7 or platelets less than 20 as per heme-onc. For her seizure history IV phenytoin was continued. She developed bluish/black discoloration of fingers as well as toes secondary to peripheral vasoconstriction. She had poor prognosis and no further recovery. Critical care team was in touch with family-her daughter. She was placed on a limited code. Later family decided to withdraw care. She was terminally extubated this morning. Following this she  and was pronounced at 1102 R. Lorean Snare Discharge Exam:  Vitals:    08/15/20 1000 08/15/20 1032 08/15/20 1100 08/15/20 1102   BP:       Pulse: 134  56 (!) 0   Resp: (!) 38 (!) 40 (!) 0 (!) 0   Temp:       TempSrc:       SpO2: (!) 85% (!) 84%     Weight:       Height:         As above she is already pronounced dead.   Both eye pupil with no reaction to light/accommodation  No spontaneous breathing noted  No heart activity noted  abd soft   Ext - with bluish/black discoloration      LABS:  Recent Labs     20  0600 20  0555 08/15/20  0530    130* 132   K 3.1* 4.0 3.6   CL 97* 97* 94*   CO2 23 20* 22   BUN 19 31* 24*   CREATININE 1.6* 1.9* 1.4*   GLUCOSE 201* 241* 206*   CALCIUM 7.2* 7.5* 7.6*       Recent Labs     20  0600 20  0555 08/15/20  0530   WBC 11.0 12.5* 12.4*   RBC 2.04* 3.09* 3.09*   HGB 6.7* 9.9* 10.0*   HCT 19.5* 28.4* 28.4*   MCV 95.6 91.9 91.9   MCH 32.8 32.0 32.4   MCHC 34.4 34.9* 35.2*   RDW 15.9* 16.0* 16.0*   PLT 17* 22* 29*   MPV NOT CALC NOT CALC 13.7*       No results for input(s): POCGLU in the last 72 hours. Imaging:   XR CHEST PORTABLE   Final Result   Stable life-support devices as described above. No significant interval change in the persistent patchy consolidative   opacities are scattered throughout the mid and lower lungs bilaterally   in the setting of cardiomegaly, possibly representing pulmonary edema   versus a multifocal or viral pneumonia. The exam has been dictated and signed by Angelika Pereira. KHADIJAH Moss-MANISH   and Demetrio Mcdaniel [facility] MD, reviewed and concurred with these findings. XR CHEST PORTABLE   Final Result      No significant change from the previous exam.    Stable position of support lines and tubes. The study was dictated by Ghazal Pate PA-C and Moncho Roth. [facility] MD   reviewed and concurred with the findings. XR CHEST PORTABLE   Final Result   Tortuous ectatic aorta   Cardiomegaly    Airspace disease, patchy diffuse, bilateral, worse in the interval                     XR CHEST PORTABLE   Final Result   Patchy bilateral infiltrates               XR CHEST PORTABLE   Final Result   Stable abnormal chest with the diffuse perihilar and bilateral   infiltrates and small pleural effusions which may be due to diffuse   pneumonia or edema. Endotracheal tube close to nesha and needs to be retracted by 2 cm. ALERT:  THIS IS AN ABNORMAL REPORT            US RETROPERITONEAL COMPLETE   Final Result   Normal bilateral renal ultrasound               XR ABDOMEN FOR NG/OG/NE TUBE PLACEMENT   Final Result   The tip of the tube is at the expected level of the gastric fundus. XR CHEST PORTABLE   Final Result   Cardiomegaly   Tortuous aorta    There are patchy infiltrates seen throughout both the lung fields. Pneumonia could give this appearance.  Underlying edema could also have   this appearance   The chest appears to be worse in the interval                  XR CHEST PORTABLE   Final Result   No significant acute process               CT HEAD WO CONTRAST   Final Result         1. No acute intracranial abnormality. 2. Chronic infarction with encephalomalacia in the left frontal lobe,   insula and the lateral aspect of the basal ganglia. CT CERVICAL SPINE WO CONTRAST   Final Result   1. No fracture or joint dislocation is seen in the cervical spine. 2. Minimal anterior wedging of the T1 vertebral body indicates an   age-indeterminate fracture. If indicated, MRI may be obtained for   further evaluation. CT CHEST WO CONTRAST   Final Result    1. Minimal age-indeterminate compression fracture deformities of the   T1 and T9 vertebral bodies. If indicated, MRI may be obtained for   further evaluation. 2. No other traumatic abnormality is seen. 3. Mild cardiomegaly with evidence of mild pulmonary arterial   hypertension. CT ABDOMEN PELVIS WO CONTRAST Additional Contrast? None   Final Result   Findings suggestive of cirrhosis with splenomegaly. No acute   abnormality in the abdomen or pelvis. XR CHEST PORTABLE   Final Result   Streaky opacities at the lung bases are nonspecific and may represent   atelectasis, scarring or an acute infectious/inflammatory process. XR CHEST PORTABLE    (Results Pending)             Note that more 30 minutes was spent in preparing discharge papers, discussing discharge with patient, medication review, etc.    Signed:  Electronically signed by Padmini Gore MD on 8/15/2020 at 11:54 AM    NOTE: This report was transcribed using voice recognition software. Every effort was made to ensure accuracy; however, inadvertent computerized transcription errors may be present.

## 2020-08-15 NOTE — PROGRESS NOTES
Pt  at 1102. This nurse to verify with Kalyan Tillman RN at bedside. Family present at bedside. No belongings with patient, family had taken prior to. [facility] called, ruled out ref num [id number]. Pt requesting 026 [facility] [phone].

## 2020-08-17 LAB
Lab: NORMAL
REPORT: NORMAL
THIS TEST SENT TO: NORMAL